# Patient Record
Sex: FEMALE | Race: WHITE | URBAN - METROPOLITAN AREA
[De-identification: names, ages, dates, MRNs, and addresses within clinical notes are randomized per-mention and may not be internally consistent; named-entity substitution may affect disease eponyms.]

---

## 2017-01-01 ENCOUNTER — TRANSFERRED RECORDS (OUTPATIENT)
Dept: HEALTH INFORMATION MANAGEMENT | Facility: CLINIC | Age: 82
End: 2017-01-01

## 2017-01-01 LAB — EJECTION FRACTION: 53

## 2017-01-03 ENCOUNTER — COMMUNICATION - HEALTHEAST (OUTPATIENT)
Dept: NURSING | Facility: CLINIC | Age: 82
End: 2017-01-03

## 2017-01-03 ENCOUNTER — AMBULATORY - HEALTHEAST (OUTPATIENT)
Dept: LAB | Facility: CLINIC | Age: 82
End: 2017-01-03

## 2017-01-03 DIAGNOSIS — I48.19 PERSISTENT ATRIAL FIBRILLATION (H): ICD-10-CM

## 2017-02-16 ENCOUNTER — TRANSFERRED RECORDS (OUTPATIENT)
Dept: HEALTH INFORMATION MANAGEMENT | Facility: CLINIC | Age: 82
End: 2017-02-16

## 2017-02-16 ENCOUNTER — RECORDS - HEALTHEAST (OUTPATIENT)
Dept: ADMINISTRATIVE | Facility: OTHER | Age: 82
End: 2017-02-16

## 2017-02-16 LAB — EJECTION FRACTION: 58

## 2017-02-21 ENCOUNTER — COMMUNICATION - HEALTHEAST (OUTPATIENT)
Dept: NURSING | Facility: CLINIC | Age: 82
End: 2017-02-21

## 2017-02-23 ENCOUNTER — AMBULATORY - HEALTHEAST (OUTPATIENT)
Dept: LAB | Facility: CLINIC | Age: 82
End: 2017-02-23

## 2017-02-23 ENCOUNTER — COMMUNICATION - HEALTHEAST (OUTPATIENT)
Dept: INTERNAL MEDICINE | Facility: CLINIC | Age: 82
End: 2017-02-23

## 2017-02-23 ENCOUNTER — COMMUNICATION - HEALTHEAST (OUTPATIENT)
Dept: FAMILY MEDICINE | Facility: CLINIC | Age: 82
End: 2017-02-23

## 2017-02-23 DIAGNOSIS — I48.91 ATRIAL FIBRILLATION (H): ICD-10-CM

## 2017-02-23 DIAGNOSIS — I48.19 PERSISTENT ATRIAL FIBRILLATION (H): ICD-10-CM

## 2017-03-01 ENCOUNTER — COMMUNICATION - HEALTHEAST (OUTPATIENT)
Dept: FAMILY MEDICINE | Facility: CLINIC | Age: 82
End: 2017-03-01

## 2017-03-01 DIAGNOSIS — I48.91 ATRIAL FIBRILLATION (H): ICD-10-CM

## 2017-03-02 ENCOUNTER — COMMUNICATION - HEALTHEAST (OUTPATIENT)
Dept: FAMILY MEDICINE | Facility: CLINIC | Age: 82
End: 2017-03-02

## 2017-03-02 DIAGNOSIS — I48.91 ATRIAL FIBRILLATION (H): ICD-10-CM

## 2017-03-02 RX ORDER — WARFARIN SODIUM 5 MG/1
TABLET ORAL
Qty: 30 TABLET | Refills: 2 | Status: SHIPPED | OUTPATIENT
Start: 2017-03-02

## 2017-03-06 ENCOUNTER — COMMUNICATION - HEALTHEAST (OUTPATIENT)
Dept: FAMILY MEDICINE | Facility: CLINIC | Age: 82
End: 2017-03-06

## 2017-03-06 DIAGNOSIS — I48.91 ATRIAL FIBRILLATION (H): ICD-10-CM

## 2017-03-14 ENCOUNTER — COMMUNICATION - HEALTHEAST (OUTPATIENT)
Dept: NURSING | Facility: CLINIC | Age: 82
End: 2017-03-14

## 2017-03-14 DIAGNOSIS — I48.91 ATRIAL FIBRILLATION (H): ICD-10-CM

## 2017-03-16 ENCOUNTER — COMMUNICATION - HEALTHEAST (OUTPATIENT)
Dept: NURSING | Facility: CLINIC | Age: 82
End: 2017-03-16

## 2017-04-01 ENCOUNTER — COMMUNICATION - HEALTHEAST (OUTPATIENT)
Dept: FAMILY MEDICINE | Facility: CLINIC | Age: 82
End: 2017-04-01

## 2017-04-01 DIAGNOSIS — I10 ESSENTIAL HYPERTENSION: ICD-10-CM

## 2017-04-03 ENCOUNTER — COMMUNICATION - HEALTHEAST (OUTPATIENT)
Dept: NURSING | Facility: CLINIC | Age: 82
End: 2017-04-03

## 2017-04-03 ENCOUNTER — AMBULATORY - HEALTHEAST (OUTPATIENT)
Dept: LAB | Facility: CLINIC | Age: 82
End: 2017-04-03

## 2017-04-03 DIAGNOSIS — I48.91 ATRIAL FIBRILLATION (H): ICD-10-CM

## 2017-04-03 RX ORDER — LOSARTAN POTASSIUM 100 MG/1
TABLET ORAL
Qty: 90 TABLET | Refills: 2 | Status: SHIPPED | OUTPATIENT
Start: 2017-04-03

## 2017-04-17 ENCOUNTER — COMMUNICATION - HEALTHEAST (OUTPATIENT)
Dept: NURSING | Facility: CLINIC | Age: 82
End: 2017-04-17

## 2017-04-17 ENCOUNTER — AMBULATORY - HEALTHEAST (OUTPATIENT)
Dept: LAB | Facility: CLINIC | Age: 82
End: 2017-04-17

## 2017-04-17 DIAGNOSIS — I48.91 ATRIAL FIBRILLATION (H): ICD-10-CM

## 2017-04-19 ENCOUNTER — COMMUNICATION - HEALTHEAST (OUTPATIENT)
Dept: FAMILY MEDICINE | Facility: CLINIC | Age: 82
End: 2017-04-19

## 2017-04-19 DIAGNOSIS — I48.91 ATRIAL FIBRILLATION (H): ICD-10-CM

## 2017-04-24 ENCOUNTER — COMMUNICATION - HEALTHEAST (OUTPATIENT)
Dept: FAMILY MEDICINE | Facility: CLINIC | Age: 82
End: 2017-04-24

## 2017-05-02 ENCOUNTER — AMBULATORY - HEALTHEAST (OUTPATIENT)
Dept: LAB | Facility: CLINIC | Age: 82
End: 2017-05-02

## 2017-05-02 ENCOUNTER — COMMUNICATION - HEALTHEAST (OUTPATIENT)
Dept: NURSING | Facility: CLINIC | Age: 82
End: 2017-05-02

## 2017-05-02 DIAGNOSIS — I48.91 ATRIAL FIBRILLATION (H): ICD-10-CM

## 2017-05-16 ENCOUNTER — COMMUNICATION - HEALTHEAST (OUTPATIENT)
Dept: NURSING | Facility: CLINIC | Age: 82
End: 2017-05-16

## 2017-05-16 ENCOUNTER — AMBULATORY - HEALTHEAST (OUTPATIENT)
Dept: LAB | Facility: CLINIC | Age: 82
End: 2017-05-16

## 2017-05-16 DIAGNOSIS — I48.91 ATRIAL FIBRILLATION (H): ICD-10-CM

## 2017-06-12 ENCOUNTER — COMMUNICATION - HEALTHEAST (OUTPATIENT)
Dept: NURSING | Facility: CLINIC | Age: 82
End: 2017-06-12

## 2017-06-12 ENCOUNTER — OFFICE VISIT - HEALTHEAST (OUTPATIENT)
Dept: FAMILY MEDICINE | Facility: CLINIC | Age: 82
End: 2017-06-12

## 2017-06-12 DIAGNOSIS — R26.9 ABNORMALITY OF GAIT: ICD-10-CM

## 2017-06-12 DIAGNOSIS — E55.9 VITAMIN D DEFICIENCY: ICD-10-CM

## 2017-06-12 DIAGNOSIS — M12.9 ARTHROPATHY: ICD-10-CM

## 2017-06-12 DIAGNOSIS — I10 ESSENTIAL HYPERTENSION: ICD-10-CM

## 2017-06-12 DIAGNOSIS — I10 ESSENTIAL HYPERTENSION WITH GOAL BLOOD PRESSURE LESS THAN 140/90: ICD-10-CM

## 2017-06-12 DIAGNOSIS — I48.91 ATRIAL FIBRILLATION (H): ICD-10-CM

## 2017-06-12 DIAGNOSIS — F32.0 MAJOR DEPRESSIVE DISORDER, SINGLE EPISODE, MILD (H): ICD-10-CM

## 2017-06-12 DIAGNOSIS — Z95.0 PRESENCE OF CARDIAC PACEMAKER: ICD-10-CM

## 2017-06-12 DIAGNOSIS — G47.00 INSOMNIA, UNSPECIFIED: ICD-10-CM

## 2017-06-12 DIAGNOSIS — I35.9 AORTIC VALVE DISORDER: ICD-10-CM

## 2017-06-12 ASSESSMENT — MIFFLIN-ST. JEOR: SCORE: 1312.29

## 2017-06-13 ENCOUNTER — COMMUNICATION - HEALTHEAST (OUTPATIENT)
Dept: FAMILY MEDICINE | Facility: CLINIC | Age: 82
End: 2017-06-13

## 2017-06-15 ENCOUNTER — COMMUNICATION - HEALTHEAST (OUTPATIENT)
Dept: FAMILY MEDICINE | Facility: CLINIC | Age: 82
End: 2017-06-15

## 2017-06-15 DIAGNOSIS — F41.9 ANXIETY: ICD-10-CM

## 2017-06-15 DIAGNOSIS — I48.91 ATRIAL FIBRILLATION (H): ICD-10-CM

## 2017-06-15 RX ORDER — CITALOPRAM HYDROBROMIDE 40 MG/1
20 TABLET ORAL DAILY
Qty: 45 TABLET | Refills: 3 | Status: SHIPPED | OUTPATIENT
Start: 2017-06-15

## 2017-06-15 RX ORDER — WARFARIN SODIUM 2.5 MG/1
TABLET ORAL
Qty: 110 TABLET | Refills: 1 | Status: SHIPPED | OUTPATIENT
Start: 2017-06-15

## 2017-07-03 ENCOUNTER — COMMUNICATION - HEALTHEAST (OUTPATIENT)
Dept: NURSING | Facility: CLINIC | Age: 82
End: 2017-07-03

## 2017-07-10 ENCOUNTER — AMBULATORY - HEALTHEAST (OUTPATIENT)
Dept: LAB | Facility: CLINIC | Age: 82
End: 2017-07-10

## 2017-07-10 ENCOUNTER — COMMUNICATION - HEALTHEAST (OUTPATIENT)
Dept: NURSING | Facility: CLINIC | Age: 82
End: 2017-07-10

## 2017-07-10 DIAGNOSIS — I48.91 ATRIAL FIBRILLATION (H): ICD-10-CM

## 2017-07-24 ENCOUNTER — COMMUNICATION - HEALTHEAST (OUTPATIENT)
Dept: NURSING | Facility: CLINIC | Age: 82
End: 2017-07-24

## 2017-07-24 ENCOUNTER — OFFICE VISIT - HEALTHEAST (OUTPATIENT)
Dept: FAMILY MEDICINE | Facility: CLINIC | Age: 82
End: 2017-07-24

## 2017-07-24 ENCOUNTER — AMBULATORY - HEALTHEAST (OUTPATIENT)
Dept: LAB | Facility: CLINIC | Age: 82
End: 2017-07-24

## 2017-07-24 ENCOUNTER — RECORDS - HEALTHEAST (OUTPATIENT)
Dept: GENERAL RADIOLOGY | Age: 82
End: 2017-07-24

## 2017-07-24 DIAGNOSIS — I48.91 ATRIAL FIBRILLATION (H): ICD-10-CM

## 2017-07-24 DIAGNOSIS — M25.511 ACUTE SHOULDER PAIN DUE TO TRAUMA, RIGHT: ICD-10-CM

## 2017-07-24 DIAGNOSIS — G89.11 ACUTE PAIN DUE TO TRAUMA: ICD-10-CM
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
## 2017-07-24 DIAGNOSIS — G89.11 ACUTE SHOULDER PAIN DUE TO TRAUMA, RIGHT: ICD-10-CM

## 2017-07-24 DIAGNOSIS — M25.511 PAIN IN RIGHT SHOULDER: ICD-10-CM

## 2017-08-05 ENCOUNTER — RECORDS - HEALTHEAST (OUTPATIENT)
Dept: ADMINISTRATIVE | Facility: OTHER | Age: 82
End: 2017-08-05

## 2017-08-14 ENCOUNTER — COMMUNICATION - HEALTHEAST (OUTPATIENT)
Dept: NURSING | Facility: CLINIC | Age: 82
End: 2017-08-14

## 2017-08-17 ENCOUNTER — AMBULATORY - HEALTHEAST (OUTPATIENT)
Dept: LAB | Facility: CLINIC | Age: 82
End: 2017-08-17

## 2017-08-17 ENCOUNTER — COMMUNICATION - HEALTHEAST (OUTPATIENT)
Dept: NURSING | Facility: CLINIC | Age: 82
End: 2017-08-17

## 2017-08-17 DIAGNOSIS — I48.91 ATRIAL FIBRILLATION (H): ICD-10-CM

## 2017-09-07 ENCOUNTER — COMMUNICATION - HEALTHEAST (OUTPATIENT)
Dept: NURSING | Facility: CLINIC | Age: 82
End: 2017-09-07

## 2017-09-07 ENCOUNTER — AMBULATORY - HEALTHEAST (OUTPATIENT)
Dept: LAB | Facility: CLINIC | Age: 82
End: 2017-09-07

## 2017-09-07 DIAGNOSIS — I48.91 ATRIAL FIBRILLATION (H): ICD-10-CM

## 2017-10-09 ENCOUNTER — COMMUNICATION - HEALTHEAST (OUTPATIENT)
Dept: NURSING | Facility: CLINIC | Age: 82
End: 2017-10-09

## 2017-10-09 ENCOUNTER — AMBULATORY - HEALTHEAST (OUTPATIENT)
Dept: LAB | Facility: CLINIC | Age: 82
End: 2017-10-09

## 2017-10-09 DIAGNOSIS — I48.91 ATRIAL FIBRILLATION (H): ICD-10-CM

## 2017-10-30 ENCOUNTER — COMMUNICATION - HEALTHEAST (OUTPATIENT)
Dept: NURSING | Facility: CLINIC | Age: 82
End: 2017-10-30

## 2017-11-20 ENCOUNTER — AMBULATORY - HEALTHEAST (OUTPATIENT)
Dept: LAB | Facility: CLINIC | Age: 82
End: 2017-11-20

## 2017-11-20 ENCOUNTER — COMMUNICATION - HEALTHEAST (OUTPATIENT)
Dept: NURSING | Facility: CLINIC | Age: 82
End: 2017-11-20

## 2017-11-20 DIAGNOSIS — I48.91 ATRIAL FIBRILLATION (H): ICD-10-CM

## 2017-11-22 ENCOUNTER — COMMUNICATION - HEALTHEAST (OUTPATIENT)
Dept: FAMILY MEDICINE | Facility: CLINIC | Age: 82
End: 2017-11-22

## 2017-11-22 DIAGNOSIS — I48.91 ATRIAL FIBRILLATION (H): ICD-10-CM

## 2017-11-22 RX ORDER — FUROSEMIDE 20 MG
TABLET ORAL
Qty: 90 TABLET | Refills: 1 | Status: SHIPPED | OUTPATIENT
Start: 2017-11-22

## 2017-12-04 ENCOUNTER — COMMUNICATION - HEALTHEAST (OUTPATIENT)
Dept: NURSING | Facility: CLINIC | Age: 82
End: 2017-12-04

## 2017-12-04 ENCOUNTER — AMBULATORY - HEALTHEAST (OUTPATIENT)
Dept: LAB | Facility: CLINIC | Age: 82
End: 2017-12-04

## 2017-12-04 DIAGNOSIS — I48.91 ATRIAL FIBRILLATION (H): ICD-10-CM

## 2017-12-18 ENCOUNTER — AMBULATORY - HEALTHEAST (OUTPATIENT)
Dept: LAB | Facility: CLINIC | Age: 82
End: 2017-12-18

## 2017-12-18 ENCOUNTER — COMMUNICATION - HEALTHEAST (OUTPATIENT)
Dept: NURSING | Facility: CLINIC | Age: 82
End: 2017-12-18

## 2017-12-18 DIAGNOSIS — I48.91 ATRIAL FIBRILLATION (H): ICD-10-CM

## 2018-01-01 ENCOUNTER — TRANSFERRED RECORDS (OUTPATIENT)
Dept: HEALTH INFORMATION MANAGEMENT | Facility: CLINIC | Age: 83
End: 2018-01-01

## 2018-01-01 ENCOUNTER — ASSISTED LIVING VISIT (OUTPATIENT)
Dept: GERIATRICS | Facility: CLINIC | Age: 83
End: 2018-01-01
Payer: COMMERCIAL

## 2018-01-01 ENCOUNTER — NURSING HOME VISIT (OUTPATIENT)
Dept: GERIATRICS | Facility: CLINIC | Age: 83
End: 2018-01-01
Payer: COMMERCIAL

## 2018-01-01 ENCOUNTER — TELEPHONE (OUTPATIENT)
Dept: CARDIOLOGY | Facility: CLINIC | Age: 83
End: 2018-01-01

## 2018-01-01 ENCOUNTER — DOCUMENTATION ONLY (OUTPATIENT)
Dept: OTHER | Facility: CLINIC | Age: 83
End: 2018-01-01

## 2018-01-01 ENCOUNTER — HOSPITAL LABORATORY (OUTPATIENT)
Dept: OTHER | Facility: CLINIC | Age: 83
End: 2018-01-01

## 2018-01-01 ENCOUNTER — DOCUMENTATION ONLY (OUTPATIENT)
Dept: CARE COORDINATION | Facility: CLINIC | Age: 83
End: 2018-01-01

## 2018-01-01 ENCOUNTER — NURSING HOME VISIT (OUTPATIENT)
Dept: GERIATRICS | Facility: CLINIC | Age: 83
End: 2018-01-01
Payer: MEDICARE

## 2018-01-01 ENCOUNTER — HOSPITAL ENCOUNTER (INPATIENT)
Facility: CLINIC | Age: 83
LOS: 4 days | Discharge: HOME HEALTH | DRG: 689 | End: 2018-06-05
Attending: EMERGENCY MEDICINE | Admitting: INTERNAL MEDICINE
Payer: MEDICARE

## 2018-01-01 ENCOUNTER — APPOINTMENT (OUTPATIENT)
Dept: CARDIOLOGY | Facility: CLINIC | Age: 83
DRG: 689 | End: 2018-01-01
Attending: INTERNAL MEDICINE
Payer: MEDICARE

## 2018-01-01 ENCOUNTER — TELEPHONE (OUTPATIENT)
Dept: GERIATRICS | Facility: CLINIC | Age: 83
End: 2018-01-01

## 2018-01-01 ENCOUNTER — DISCHARGE SUMMARY NURSING HOME (OUTPATIENT)
Dept: GERIATRICS | Facility: CLINIC | Age: 83
End: 2018-01-01
Payer: COMMERCIAL

## 2018-01-01 ENCOUNTER — APPOINTMENT (OUTPATIENT)
Dept: GENERAL RADIOLOGY | Facility: CLINIC | Age: 83
DRG: 689 | End: 2018-01-01
Attending: EMERGENCY MEDICINE
Payer: MEDICARE

## 2018-01-01 ENCOUNTER — DOCUMENTATION ONLY (OUTPATIENT)
Dept: GERIATRICS | Facility: CLINIC | Age: 83
End: 2018-01-01
Payer: COMMERCIAL

## 2018-01-01 VITALS
BODY MASS INDEX: 28.92 KG/M2 | OXYGEN SATURATION: 88 % | DIASTOLIC BLOOD PRESSURE: 74 MMHG | HEART RATE: 62 BPM | WEIGHT: 190.8 LBS | RESPIRATION RATE: 16 BRPM | TEMPERATURE: 97.2 F | SYSTOLIC BLOOD PRESSURE: 111 MMHG | HEIGHT: 68 IN

## 2018-01-01 VITALS
HEIGHT: 68 IN | BODY MASS INDEX: 29.31 KG/M2 | WEIGHT: 193.4 LBS | SYSTOLIC BLOOD PRESSURE: 134 MMHG | TEMPERATURE: 98.1 F | RESPIRATION RATE: 16 BRPM | HEART RATE: 69 BPM | OXYGEN SATURATION: 94 % | DIASTOLIC BLOOD PRESSURE: 77 MMHG

## 2018-01-01 VITALS
SYSTOLIC BLOOD PRESSURE: 137 MMHG | HEART RATE: 63 BPM | HEIGHT: 68 IN | RESPIRATION RATE: 17 BRPM | TEMPERATURE: 98.6 F | DIASTOLIC BLOOD PRESSURE: 67 MMHG | OXYGEN SATURATION: 93 % | BODY MASS INDEX: 29.4 KG/M2 | WEIGHT: 194 LBS

## 2018-01-01 VITALS
RESPIRATION RATE: 16 BRPM | DIASTOLIC BLOOD PRESSURE: 75 MMHG | OXYGEN SATURATION: 97 % | SYSTOLIC BLOOD PRESSURE: 131 MMHG | BODY MASS INDEX: 31.47 KG/M2 | WEIGHT: 207 LBS | HEART RATE: 69 BPM | TEMPERATURE: 98.6 F

## 2018-01-01 VITALS
DIASTOLIC BLOOD PRESSURE: 86 MMHG | OXYGEN SATURATION: 93 % | SYSTOLIC BLOOD PRESSURE: 146 MMHG | HEIGHT: 68 IN | HEART RATE: 80 BPM | WEIGHT: 192.8 LBS | TEMPERATURE: 98.1 F | RESPIRATION RATE: 16 BRPM | BODY MASS INDEX: 29.22 KG/M2

## 2018-01-01 VITALS
DIASTOLIC BLOOD PRESSURE: 62 MMHG | RESPIRATION RATE: 18 BRPM | WEIGHT: 206.5 LBS | HEART RATE: 55 BPM | TEMPERATURE: 97 F | OXYGEN SATURATION: 93 % | SYSTOLIC BLOOD PRESSURE: 127 MMHG | HEIGHT: 68 IN | BODY MASS INDEX: 31.3 KG/M2

## 2018-01-01 VITALS
SYSTOLIC BLOOD PRESSURE: 143 MMHG | WEIGHT: 206.6 LBS | HEART RATE: 63 BPM | RESPIRATION RATE: 16 BRPM | BODY MASS INDEX: 31.41 KG/M2 | TEMPERATURE: 98.2 F | DIASTOLIC BLOOD PRESSURE: 83 MMHG | OXYGEN SATURATION: 95 %

## 2018-01-01 VITALS
TEMPERATURE: 98.2 F | DIASTOLIC BLOOD PRESSURE: 91 MMHG | BODY MASS INDEX: 29.8 KG/M2 | OXYGEN SATURATION: 96 % | WEIGHT: 196 LBS | SYSTOLIC BLOOD PRESSURE: 142 MMHG | RESPIRATION RATE: 17 BRPM | HEART RATE: 86 BPM

## 2018-01-01 VITALS
SYSTOLIC BLOOD PRESSURE: 130 MMHG | TEMPERATURE: 98 F | DIASTOLIC BLOOD PRESSURE: 76 MMHG | RESPIRATION RATE: 16 BRPM | HEART RATE: 61 BPM | OXYGEN SATURATION: 94 %

## 2018-01-01 VITALS
OXYGEN SATURATION: 97 % | HEART RATE: 58 BPM | BODY MASS INDEX: 31.17 KG/M2 | SYSTOLIC BLOOD PRESSURE: 131 MMHG | WEIGHT: 205 LBS | TEMPERATURE: 98.8 F | RESPIRATION RATE: 16 BRPM | DIASTOLIC BLOOD PRESSURE: 82 MMHG

## 2018-01-01 VITALS
OXYGEN SATURATION: 96 % | BODY MASS INDEX: 30.03 KG/M2 | WEIGHT: 197.5 LBS | HEART RATE: 57 BPM | SYSTOLIC BLOOD PRESSURE: 115 MMHG | DIASTOLIC BLOOD PRESSURE: 79 MMHG | RESPIRATION RATE: 18 BRPM

## 2018-01-01 VITALS
OXYGEN SATURATION: 91 % | RESPIRATION RATE: 16 BRPM | SYSTOLIC BLOOD PRESSURE: 130 MMHG | TEMPERATURE: 97.4 F | HEART RATE: 66 BPM | DIASTOLIC BLOOD PRESSURE: 70 MMHG

## 2018-01-01 VITALS
RESPIRATION RATE: 18 BRPM | BODY MASS INDEX: 29.4 KG/M2 | OXYGEN SATURATION: 94 % | HEIGHT: 68 IN | DIASTOLIC BLOOD PRESSURE: 71 MMHG | TEMPERATURE: 98.1 F | WEIGHT: 194 LBS | HEART RATE: 73 BPM | SYSTOLIC BLOOD PRESSURE: 156 MMHG

## 2018-01-01 VITALS
OXYGEN SATURATION: 93 % | DIASTOLIC BLOOD PRESSURE: 90 MMHG | BODY MASS INDEX: 31.28 KG/M2 | HEART RATE: 81 BPM | WEIGHT: 205.7 LBS | SYSTOLIC BLOOD PRESSURE: 145 MMHG | RESPIRATION RATE: 18 BRPM

## 2018-01-01 VITALS
SYSTOLIC BLOOD PRESSURE: 138 MMHG | RESPIRATION RATE: 17 BRPM | WEIGHT: 191.4 LBS | OXYGEN SATURATION: 97 % | DIASTOLIC BLOOD PRESSURE: 81 MMHG | HEIGHT: 68 IN | BODY MASS INDEX: 29.01 KG/M2 | TEMPERATURE: 97.7 F | HEART RATE: 83 BPM

## 2018-01-01 VITALS
OXYGEN SATURATION: 94 % | TEMPERATURE: 98.2 F | RESPIRATION RATE: 16 BRPM | HEART RATE: 55 BPM | SYSTOLIC BLOOD PRESSURE: 122 MMHG | DIASTOLIC BLOOD PRESSURE: 73 MMHG

## 2018-01-01 VITALS
HEART RATE: 83 BPM | SYSTOLIC BLOOD PRESSURE: 142 MMHG | WEIGHT: 200.9 LBS | OXYGEN SATURATION: 95 % | RESPIRATION RATE: 16 BRPM | DIASTOLIC BLOOD PRESSURE: 92 MMHG | TEMPERATURE: 97.9 F | BODY MASS INDEX: 30.55 KG/M2

## 2018-01-01 VITALS — RESPIRATION RATE: 28 BRPM | HEART RATE: 105 BPM | OXYGEN SATURATION: 65 %

## 2018-01-01 DIAGNOSIS — Z79.01 LONG-TERM (CURRENT) USE OF ANTICOAGULANTS: ICD-10-CM

## 2018-01-01 DIAGNOSIS — R41.89 COGNITIVE IMPAIRMENT: ICD-10-CM

## 2018-01-01 DIAGNOSIS — R53.81 PHYSICAL DECONDITIONING: ICD-10-CM

## 2018-01-01 DIAGNOSIS — F43.21 ADJUSTMENT DISORDER WITH DEPRESSED MOOD: ICD-10-CM

## 2018-01-01 DIAGNOSIS — I35.0 CALCIFIC AORTIC VALVE STENOSIS: ICD-10-CM

## 2018-01-01 DIAGNOSIS — I48.20 CHRONIC ATRIAL FIBRILLATION (H): ICD-10-CM

## 2018-01-01 DIAGNOSIS — Z79.01 ENCOUNTER FOR MONITORING COUMADIN THERAPY: Primary | ICD-10-CM

## 2018-01-01 DIAGNOSIS — J10.1 INFLUENZA A: Primary | ICD-10-CM

## 2018-01-01 DIAGNOSIS — Z51.81 ENCOUNTER FOR THERAPEUTIC DRUG MONITORING: Primary | ICD-10-CM

## 2018-01-01 DIAGNOSIS — I50.9 CHRONIC CONGESTIVE HEART FAILURE, UNSPECIFIED CONGESTIVE HEART FAILURE TYPE: Primary | ICD-10-CM

## 2018-01-01 DIAGNOSIS — I48.20 CHRONIC A-FIB (H): ICD-10-CM

## 2018-01-01 DIAGNOSIS — N30.00 ACUTE CYSTITIS WITHOUT HEMATURIA: ICD-10-CM

## 2018-01-01 DIAGNOSIS — Z51.81 ENCOUNTER FOR MONITORING COUMADIN THERAPY: Primary | ICD-10-CM

## 2018-01-01 DIAGNOSIS — I10 BENIGN ESSENTIAL HYPERTENSION: ICD-10-CM

## 2018-01-01 DIAGNOSIS — R60.0 LOWER EXTREMITY EDEMA: ICD-10-CM

## 2018-01-01 DIAGNOSIS — I50.9 CHRONIC CONGESTIVE HEART FAILURE, UNSPECIFIED CONGESTIVE HEART FAILURE TYPE: ICD-10-CM

## 2018-01-01 DIAGNOSIS — Z95.0 CARDIAC PACEMAKER IN SITU: ICD-10-CM

## 2018-01-01 DIAGNOSIS — J10.1 INFLUENZA A: ICD-10-CM

## 2018-01-01 DIAGNOSIS — I50.42 CHRONIC COMBINED SYSTOLIC AND DIASTOLIC CONGESTIVE HEART FAILURE (H): Primary | ICD-10-CM

## 2018-01-01 DIAGNOSIS — I50.33 ACUTE ON CHRONIC DIASTOLIC CONGESTIVE HEART FAILURE (H): ICD-10-CM

## 2018-01-01 DIAGNOSIS — F32.A DEPRESSION, UNSPECIFIED DEPRESSION TYPE: ICD-10-CM

## 2018-01-01 DIAGNOSIS — F02.80 LATE ONSET ALZHEIMER'S DISEASE WITHOUT BEHAVIORAL DISTURBANCE (H): ICD-10-CM

## 2018-01-01 DIAGNOSIS — G30.1 LATE ONSET ALZHEIMER'S DISEASE WITHOUT BEHAVIORAL DISTURBANCE (H): ICD-10-CM

## 2018-01-01 DIAGNOSIS — E55.9 VITAMIN D DEFICIENCY: ICD-10-CM

## 2018-01-01 DIAGNOSIS — Z71.89 ACP (ADVANCE CARE PLANNING): Chronic | ICD-10-CM

## 2018-01-01 DIAGNOSIS — I48.91 ATRIAL FIBRILLATION, UNSPECIFIED TYPE (H): ICD-10-CM

## 2018-01-01 DIAGNOSIS — Z51.81 ENCOUNTER FOR MONITORING COUMADIN THERAPY: ICD-10-CM

## 2018-01-01 DIAGNOSIS — I50.9 CONGESTIVE HEART FAILURE, UNSPECIFIED CONGESTIVE HEART FAILURE CHRONICITY, UNSPECIFIED CONGESTIVE HEART FAILURE TYPE: ICD-10-CM

## 2018-01-01 DIAGNOSIS — I50.32 CHRONIC DIASTOLIC CONGESTIVE HEART FAILURE (H): ICD-10-CM

## 2018-01-01 DIAGNOSIS — R29.898 SEVERE MUSCLE DECONDITIONING: Primary | ICD-10-CM

## 2018-01-01 DIAGNOSIS — I50.31 ACUTE DIASTOLIC CONGESTIVE HEART FAILURE (H): ICD-10-CM

## 2018-01-01 DIAGNOSIS — Z79.01 ENCOUNTER FOR MONITORING COUMADIN THERAPY: ICD-10-CM

## 2018-01-01 DIAGNOSIS — G47.00 INSOMNIA, UNSPECIFIED TYPE: ICD-10-CM

## 2018-01-01 DIAGNOSIS — Z51.81 ENCOUNTER FOR THERAPEUTIC DRUG MONITORING: ICD-10-CM

## 2018-01-01 DIAGNOSIS — R06.02 SHORTNESS OF BREATH: ICD-10-CM

## 2018-01-01 DIAGNOSIS — Z87.09 HISTORY OF INFLUENZA: Primary | ICD-10-CM

## 2018-01-01 DIAGNOSIS — R00.1 BRADYCARDIA: ICD-10-CM

## 2018-01-01 DIAGNOSIS — R31.0 GROSS HEMATURIA: ICD-10-CM

## 2018-01-01 DIAGNOSIS — R79.89 ELEVATED BRAIN NATRIURETIC PEPTIDE (BNP) LEVEL: ICD-10-CM

## 2018-01-01 DIAGNOSIS — R06.02 SOB (SHORTNESS OF BREATH): Primary | ICD-10-CM

## 2018-01-01 DIAGNOSIS — R53.81 DECLINING FUNCTIONAL STATUS: Primary | ICD-10-CM

## 2018-01-01 LAB
ALBUMIN UR-MCNC: NEGATIVE MG/DL
ANION GAP SERPL CALCULATED.3IONS-SCNC: 2 MMOL/L (ref 3–14)
ANION GAP SERPL CALCULATED.3IONS-SCNC: 3 MMOL/L (ref 3–14)
ANION GAP SERPL CALCULATED.3IONS-SCNC: 3 MMOL/L (ref 3–14)
ANION GAP SERPL CALCULATED.3IONS-SCNC: 4 MMOL/L (ref 3–14)
ANION GAP SERPL CALCULATED.3IONS-SCNC: 5 MMOL/L (ref 3–14)
ANION GAP SERPL CALCULATED.3IONS-SCNC: 5 MMOL/L (ref 3–14)
ANION GAP SERPL CALCULATED.3IONS-SCNC: 6 MMOL/L (ref 3–14)
ANION GAP SERPL CALCULATED.3IONS-SCNC: 7 MMOL/L (ref 3–14)
ANION GAP SERPL CALCULATED.3IONS-SCNC: 8 MMOL/L (ref 3–14)
ANION GAP SERPL CALCULATED.3IONS-SCNC: 8 MMOL/L (ref 3–14)
APPEARANCE UR: ABNORMAL
BACTERIA #/AREA URNS HPF: ABNORMAL /HPF
BACTERIA SPEC CULT: ABNORMAL
BASOPHILS # BLD AUTO: 0.1 10E9/L (ref 0–0.2)
BASOPHILS NFR BLD AUTO: 0.8 %
BILIRUB UR QL STRIP: NEGATIVE
BUN SERPL-MCNC: 17 MG/DL (ref 7–30)
BUN SERPL-MCNC: 19 MG/DL (ref 7–30)
BUN SERPL-MCNC: 19 MG/DL (ref 7–30)
BUN SERPL-MCNC: 20 MG/DL (ref 7–30)
BUN SERPL-MCNC: 21 MG/DL (ref 7–30)
BUN SERPL-MCNC: 22 MG/DL (ref 7–30)
BUN SERPL-MCNC: 23 MG/DL (ref 7–30)
BUN SERPL-MCNC: 24 MG/DL (ref 7–30)
CALCIUM SERPL-MCNC: 8.9 MG/DL (ref 8.5–10.1)
CALCIUM SERPL-MCNC: 9 MG/DL (ref 8.5–10.1)
CALCIUM SERPL-MCNC: 9 MG/DL (ref 8.5–10.1)
CALCIUM SERPL-MCNC: 9.1 MG/DL (ref 8.5–10.1)
CALCIUM SERPL-MCNC: 9.3 MG/DL (ref 8.5–10.1)
CALCIUM SERPL-MCNC: 9.3 MG/DL (ref 8.5–10.1)
CALCIUM SERPL-MCNC: 9.4 MG/DL (ref 8.5–10.1)
CALCIUM SERPL-MCNC: 9.4 MG/DL (ref 8.5–10.1)
CALCIUM SERPL-MCNC: 9.5 MG/DL (ref 8.5–10.1)
CHLORIDE SERPL-SCNC: 102 MMOL/L (ref 94–109)
CHLORIDE SERPL-SCNC: 102 MMOL/L (ref 94–109)
CHLORIDE SERPL-SCNC: 103 MMOL/L (ref 94–109)
CHLORIDE SERPL-SCNC: 105 MMOL/L (ref 94–109)
CHLORIDE SERPL-SCNC: 105 MMOL/L (ref 94–109)
CHLORIDE SERPLBLD-SCNC: 102 MMOL/L (ref 94–109)
CHLORIDE SERPLBLD-SCNC: 102 MMOL/L (ref 94–109)
CHLORIDE SERPLBLD-SCNC: 104 MMOL/L (ref 94–109)
CHLORIDE SERPLBLD-SCNC: 104 MMOL/L (ref 94–109)
CHLORIDE SERPLBLD-SCNC: 105 MMOL/L (ref 94–109)
CHLORIDE SERPLBLD-SCNC: 106 MMOL/L (ref 94–109)
CHLORIDE SERPLBLD-SCNC: 107 MMOL/L (ref 94–109)
CO2 SERPL-SCNC: 29 MMOL/L (ref 20–32)
CO2 SERPL-SCNC: 29 MMOL/L (ref 20–32)
CO2 SERPL-SCNC: 30 MMOL/L (ref 20–32)
CO2 SERPL-SCNC: 30 MMOL/L (ref 20–32)
CO2 SERPL-SCNC: 31 MMOL/L (ref 20–32)
CO2 SERPL-SCNC: 32 MMOL/L (ref 20–32)
CO2 SERPL-SCNC: 33 MMOL/L (ref 20–32)
CO2 SERPL-SCNC: 33 MMOL/L (ref 20–32)
CO2 SERPL-SCNC: 34 MMOL/L (ref 20–32)
CO2 SERPL-SCNC: 35 MMOL/L (ref 20–32)
COLOR UR AUTO: YELLOW
CREAT SERPL-MCNC: 0.64 MG/DL (ref 0.52–1.04)
CREAT SERPL-MCNC: 0.71 MG/DL (ref 0.52–1.04)
CREAT SERPL-MCNC: 0.72 MG/DL (ref 0.52–1.04)
CREAT SERPL-MCNC: 0.72 MG/DL (ref 0.52–1.04)
CREAT SERPL-MCNC: 0.74 MG/DL (ref 0.52–1.04)
CREAT SERPL-MCNC: 0.78 MG/DL (ref 0.52–1.04)
CREAT SERPL-MCNC: 0.78 MG/DL (ref 0.52–1.04)
CREAT SERPL-MCNC: 0.79 MG/DL (ref 0.52–1.04)
CREAT SERPL-MCNC: 0.8 MG/DL (ref 0.52–1.04)
CREAT SERPL-MCNC: 0.81 MG/DL (ref 0.52–1.04)
CREAT SERPL-MCNC: 0.84 MG/DL (ref 0.52–1.04)
CREAT SERPL-MCNC: 0.84 MG/DL (ref 0.52–1.04)
DIFFERENTIAL METHOD BLD: ABNORMAL
EOSINOPHIL # BLD AUTO: 0.2 10E9/L (ref 0–0.7)
EOSINOPHIL NFR BLD AUTO: 2.6 %
ERYTHROCYTE [DISTWIDTH] IN BLOOD BY AUTOMATED COUNT: 16.2 % (ref 10–15)
ERYTHROCYTE [DISTWIDTH] IN BLOOD BY AUTOMATED COUNT: 16.5 % (ref 10–15)
GFR SERPL CREATININE-BSD FRML MDRD: 64 ML/MIN/1.73M2
GFR SERPL CREATININE-BSD FRML MDRD: 64 ML/MIN/1.7M2
GFR SERPL CREATININE-BSD FRML MDRD: 67 ML/MIN/1.7M2
GFR SERPL CREATININE-BSD FRML MDRD: 68 ML/MIN/1.73M2
GFR SERPL CREATININE-BSD FRML MDRD: 69 ML/MIN/1.73M2
GFR SERPL CREATININE-BSD FRML MDRD: 70 ML/MIN/1.7M2
GFR SERPL CREATININE-BSD FRML MDRD: 70 ML/MIN/1.7M2
GFR SERPL CREATININE-BSD FRML MDRD: 74 ML/MIN/1.7M2
GFR SERPL CREATININE-BSD FRML MDRD: 76 ML/MIN/1.73M2
GFR SERPL CREATININE-BSD FRML MDRD: 77 ML/MIN/1.73M2
GFR SERPL CREATININE-BSD FRML MDRD: 78 ML/MIN/1.73M2
GFR SERPL CREATININE-BSD FRML MDRD: 88 ML/MIN/1.73M2
GLUCOSE SERPL-MCNC: 136 MG/DL (ref 70–99)
GLUCOSE SERPL-MCNC: 79 MG/DL (ref 70–99)
GLUCOSE SERPL-MCNC: 80 MG/DL (ref 70–99)
GLUCOSE SERPL-MCNC: 82 MG/DL (ref 70–99)
GLUCOSE SERPL-MCNC: 84 MG/DL (ref 70–99)
GLUCOSE SERPL-MCNC: 85 MG/DL (ref 70–99)
GLUCOSE SERPL-MCNC: 87 MG/DL (ref 70–99)
GLUCOSE SERPL-MCNC: 89 MG/DL (ref 70–99)
GLUCOSE SERPL-MCNC: 90 MG/DL (ref 70–99)
GLUCOSE SERPL-MCNC: 97 MG/DL (ref 70–99)
GLUCOSE UR STRIP-MCNC: NEGATIVE MG/DL
HCT VFR BLD AUTO: 41.4 % (ref 35–47)
HCT VFR BLD AUTO: 42.4 % (ref 35–47)
HEMOGLOBIN: 12.5 G/DL (ref 11.7–15.7)
HGB BLD-MCNC: 12.6 G/DL (ref 11.7–15.7)
HGB UR QL STRIP: ABNORMAL
HYALINE CASTS #/AREA URNS LPF: 1 /LPF (ref 0–2)
IMM GRANULOCYTES # BLD: 0 10E9/L (ref 0–0.4)
IMM GRANULOCYTES NFR BLD: 0.7 %
INR PPP: 1.03 (ref 0.86–1.14)
INR PPP: 1.47 (ref 0.86–1.14)
INR PPP: 1.91 (ref 0.86–1.14)
INR PPP: 1.97 (ref 0.86–1.14)
INR PPP: 2.01 (ref 0.86–1.14)
INR PPP: 2.16 (ref 0.86–1.14)
INR PPP: 2.34 (ref 0.86–1.14)
INR PPP: 2.36 (ref 0.86–1.14)
INR PPP: 2.4 (ref 0.86–1.14)
INR PPP: 2.66 (ref 0.86–1.14)
INR PPP: 2.73 (ref 0.86–1.14)
INR PPP: 2.8 (ref 0.86–1.14)
INR PPP: 3.26 (ref 0.86–1.14)
INR PPP: 3.49 (ref 0.86–1.14)
INTERPRETATION ECG - MUSE: NORMAL
KETONES UR STRIP-MCNC: NEGATIVE MG/DL
LEUKOCYTE ESTERASE UR QL STRIP: ABNORMAL
LYMPHOCYTES # BLD AUTO: 1.7 10E9/L (ref 0.8–5.3)
LYMPHOCYTES NFR BLD AUTO: 27.5 %
Lab: ABNORMAL
MAGNESIUM SERPL-MCNC: 1.9 MG/DL (ref 1.6–2.3)
MAGNESIUM SERPL-MCNC: 2 MG/DL (ref 1.6–2.3)
MCH RBC QN AUTO: 25.6 PG (ref 26.5–33)
MCH RBC QN AUTO: 26 PG (ref 26.5–33)
MCHC RBC AUTO-ENTMCNC: 29.7 G/DL (ref 31.5–36.5)
MCHC RBC AUTO-ENTMCNC: 30.2 G/DL (ref 31.5–36.5)
MCV RBC AUTO: 85 FL (ref 78–100)
MCV RBC AUTO: 87 FL (ref 78–100)
MONOCYTES # BLD AUTO: 0.8 10E9/L (ref 0–1.3)
MONOCYTES NFR BLD AUTO: 12.8 %
NEUTROPHILS # BLD AUTO: 3.4 10E9/L (ref 1.6–8.3)
NEUTROPHILS NFR BLD AUTO: 55.6 %
NITRATE UR QL: POSITIVE
NRBC # BLD AUTO: 0 10*3/UL
NRBC BLD AUTO-RTO: 0 /100
NT-PROBNP SERPL-MCNC: 2475 PG/ML (ref 0–1800)
PH UR STRIP: 6 PH (ref 5–7)
PLATELET # BLD AUTO: 171 10E9/L (ref 150–450)
PLATELET # BLD AUTO: 172 10E9/L (ref 150–450)
POTASSIUM SERPL-SCNC: 3.8 MMOL/L (ref 3.4–5.3)
POTASSIUM SERPL-SCNC: 3.9 MMOL/L (ref 3.4–5.3)
POTASSIUM SERPL-SCNC: 3.9 MMOL/L (ref 3.4–5.3)
POTASSIUM SERPL-SCNC: 4 MMOL/L (ref 3.4–5.3)
POTASSIUM SERPL-SCNC: 4.1 MMOL/L (ref 3.4–5.3)
POTASSIUM SERPL-SCNC: 4.2 MMOL/L (ref 3.4–5.3)
POTASSIUM SERPL-SCNC: 4.3 MMOL/L (ref 3.4–5.3)
RBC # BLD AUTO: 4.85 10E12/L (ref 3.8–5.2)
RBC # BLD AUTO: 4.88 10E12/L (ref 3.8–5.2)
RBC #/AREA URNS AUTO: 1 /HPF (ref 0–2)
SODIUM SERPL-SCNC: 138 MMOL/L (ref 133–144)
SODIUM SERPL-SCNC: 139 MMOL/L (ref 133–144)
SODIUM SERPL-SCNC: 139 MMOL/L (ref 133–144)
SODIUM SERPL-SCNC: 140 MMOL/L (ref 133–144)
SODIUM SERPL-SCNC: 142 MMOL/L (ref 133–144)
SODIUM SERPL-SCNC: 144 MMOL/L (ref 133–144)
SOURCE: ABNORMAL
SP GR UR STRIP: 1.01 (ref 1–1.03)
SPECIMEN SOURCE: ABNORMAL
SQUAMOUS #/AREA URNS AUTO: 1 /HPF (ref 0–1)
TROPONIN I SERPL-MCNC: <0.015 UG/L (ref 0–0.04)
UROBILINOGEN UR STRIP-MCNC: 0 MG/DL (ref 0–2)
WBC # BLD AUTO: 6.1 10E9/L (ref 4–11)
WBC # BLD AUTO: 8 10E9/L (ref 4–11)
WBC #/AREA URNS AUTO: 6 /HPF (ref 0–5)

## 2018-01-01 PROCEDURE — 85025 COMPLETE CBC W/AUTO DIFF WBC: CPT | Performed by: EMERGENCY MEDICINE

## 2018-01-01 PROCEDURE — 12000007 ZZH R&B INTERMEDIATE

## 2018-01-01 PROCEDURE — 87186 SC STD MICRODIL/AGAR DIL: CPT | Performed by: INTERNAL MEDICINE

## 2018-01-01 PROCEDURE — 25000132 ZZH RX MED GY IP 250 OP 250 PS 637: Mod: GY | Performed by: INTERNAL MEDICINE

## 2018-01-01 PROCEDURE — 71046 X-RAY EXAM CHEST 2 VIEWS: CPT

## 2018-01-01 PROCEDURE — 99239 HOSP IP/OBS DSCHRG MGMT >30: CPT | Performed by: HOSPITALIST

## 2018-01-01 PROCEDURE — 99316 NF DSCHRG MGMT 30 MIN+: CPT | Performed by: NURSE PRACTITIONER

## 2018-01-01 PROCEDURE — 36415 COLL VENOUS BLD VENIPUNCTURE: CPT | Performed by: INTERNAL MEDICINE

## 2018-01-01 PROCEDURE — 99305 1ST NF CARE MODERATE MDM 35: CPT | Performed by: INTERNAL MEDICINE

## 2018-01-01 PROCEDURE — 85610 PROTHROMBIN TIME: CPT | Performed by: INTERNAL MEDICINE

## 2018-01-01 PROCEDURE — 83735 ASSAY OF MAGNESIUM: CPT | Performed by: INTERNAL MEDICINE

## 2018-01-01 PROCEDURE — 99358 PROLONG SERVICE W/O CONTACT: CPT | Performed by: NURSE PRACTITIONER

## 2018-01-01 PROCEDURE — 25000128 H RX IP 250 OP 636: Performed by: INTERNAL MEDICINE

## 2018-01-01 PROCEDURE — A9270 NON-COVERED ITEM OR SERVICE: HCPCS | Mod: GY | Performed by: INTERNAL MEDICINE

## 2018-01-01 PROCEDURE — 80048 BASIC METABOLIC PNL TOTAL CA: CPT | Performed by: INTERNAL MEDICINE

## 2018-01-01 PROCEDURE — 96365 THER/PROPH/DIAG IV INF INIT: CPT

## 2018-01-01 PROCEDURE — 87088 URINE BACTERIA CULTURE: CPT | Performed by: INTERNAL MEDICINE

## 2018-01-01 PROCEDURE — 93005 ELECTROCARDIOGRAM TRACING: CPT

## 2018-01-01 PROCEDURE — 25000128 H RX IP 250 OP 636: Performed by: EMERGENCY MEDICINE

## 2018-01-01 PROCEDURE — 99285 EMERGENCY DEPT VISIT HI MDM: CPT | Mod: 25

## 2018-01-01 PROCEDURE — A9270 NON-COVERED ITEM OR SERVICE: HCPCS | Mod: GY

## 2018-01-01 PROCEDURE — 25000132 ZZH RX MED GY IP 250 OP 250 PS 637: Mod: GY

## 2018-01-01 PROCEDURE — 99232 SBSQ HOSP IP/OBS MODERATE 35: CPT | Performed by: INTERNAL MEDICINE

## 2018-01-01 PROCEDURE — 99309 SBSQ NF CARE MODERATE MDM 30: CPT | Performed by: NURSE PRACTITIONER

## 2018-01-01 PROCEDURE — 84484 ASSAY OF TROPONIN QUANT: CPT | Performed by: EMERGENCY MEDICINE

## 2018-01-01 PROCEDURE — 93306 TTE W/DOPPLER COMPLETE: CPT | Mod: 26 | Performed by: INTERNAL MEDICINE

## 2018-01-01 PROCEDURE — 93306 TTE W/DOPPLER COMPLETE: CPT

## 2018-01-01 PROCEDURE — 81001 URINALYSIS AUTO W/SCOPE: CPT | Performed by: EMERGENCY MEDICINE

## 2018-01-01 PROCEDURE — 99308 SBSQ NF CARE LOW MDM 20: CPT | Performed by: NURSE PRACTITIONER

## 2018-01-01 PROCEDURE — 99222 1ST HOSP IP/OBS MODERATE 55: CPT | Performed by: INTERNAL MEDICINE

## 2018-01-01 PROCEDURE — 99233 SBSQ HOSP IP/OBS HIGH 50: CPT | Performed by: HOSPITALIST

## 2018-01-01 PROCEDURE — 80048 BASIC METABOLIC PNL TOTAL CA: CPT | Performed by: EMERGENCY MEDICINE

## 2018-01-01 PROCEDURE — 85610 PROTHROMBIN TIME: CPT | Performed by: EMERGENCY MEDICINE

## 2018-01-01 PROCEDURE — 25500064 ZZH RX 255 OP 636: Performed by: INTERNAL MEDICINE

## 2018-01-01 PROCEDURE — 99310 SBSQ NF CARE HIGH MDM 45: CPT | Performed by: NURSE PRACTITIONER

## 2018-01-01 PROCEDURE — 96375 TX/PRO/DX INJ NEW DRUG ADDON: CPT

## 2018-01-01 PROCEDURE — 99233 SBSQ HOSP IP/OBS HIGH 50: CPT | Performed by: INTERNAL MEDICINE

## 2018-01-01 PROCEDURE — 83735 ASSAY OF MAGNESIUM: CPT | Performed by: EMERGENCY MEDICINE

## 2018-01-01 PROCEDURE — 87086 URINE CULTURE/COLONY COUNT: CPT | Performed by: INTERNAL MEDICINE

## 2018-01-01 PROCEDURE — 83880 ASSAY OF NATRIURETIC PEPTIDE: CPT | Performed by: EMERGENCY MEDICINE

## 2018-01-01 PROCEDURE — 99223 1ST HOSP IP/OBS HIGH 75: CPT | Mod: AI | Performed by: INTERNAL MEDICINE

## 2018-01-01 RX ORDER — FUROSEMIDE 40 MG
40 TABLET ORAL 2 TIMES DAILY
Status: DISCONTINUED | OUTPATIENT
Start: 2018-01-01 | End: 2018-01-01 | Stop reason: HOSPADM

## 2018-01-01 RX ORDER — POTASSIUM CL/LIDO/0.9 % NACL 10MEQ/0.1L
10 INTRAVENOUS SOLUTION, PIGGYBACK (ML) INTRAVENOUS
Status: DISCONTINUED | OUTPATIENT
Start: 2018-01-01 | End: 2018-01-01 | Stop reason: HOSPADM

## 2018-01-01 RX ORDER — FUROSEMIDE 40 MG
40 TABLET ORAL DAILY
Status: DISCONTINUED | OUTPATIENT
Start: 2018-01-01 | End: 2018-01-01

## 2018-01-01 RX ORDER — WARFARIN SODIUM 3 MG/1
3 TABLET ORAL
Status: COMPLETED | OUTPATIENT
Start: 2018-01-01 | End: 2018-01-01

## 2018-01-01 RX ORDER — AMOXICILLIN 250 MG
1 CAPSULE ORAL 2 TIMES DAILY PRN
Status: DISCONTINUED | OUTPATIENT
Start: 2018-01-01 | End: 2018-01-01 | Stop reason: HOSPADM

## 2018-01-01 RX ORDER — AMPICILLIN TRIHYDRATE 500 MG
500 CAPSULE ORAL 2 TIMES DAILY
COMMUNITY
End: 2018-01-01

## 2018-01-01 RX ORDER — ACETAMINOPHEN 650 MG/1
650 SUPPOSITORY RECTAL EVERY 4 HOURS PRN
COMMUNITY

## 2018-01-01 RX ORDER — BISACODYL 10 MG
10 SUPPOSITORY, RECTAL RECTAL DAILY PRN
Status: DISCONTINUED | OUTPATIENT
Start: 2018-01-01 | End: 2018-01-01 | Stop reason: HOSPADM

## 2018-01-01 RX ORDER — WARFARIN SODIUM 2.5 MG/1
2.5 TABLET ORAL
Status: COMPLETED | OUTPATIENT
Start: 2018-01-01 | End: 2018-01-01

## 2018-01-01 RX ORDER — FUROSEMIDE 40 MG
40 TABLET ORAL 2 TIMES DAILY
Status: DISCONTINUED | OUTPATIENT
Start: 2018-01-01 | End: 2018-01-01

## 2018-01-01 RX ORDER — ALBUTEROL SULFATE 90 UG/1
2 AEROSOL, METERED RESPIRATORY (INHALATION) 4 TIMES DAILY PRN
COMMUNITY
End: 2018-01-01

## 2018-01-01 RX ORDER — ALBUTEROL SULFATE 90 UG/1
2 AEROSOL, METERED RESPIRATORY (INHALATION) 4 TIMES DAILY
COMMUNITY
End: 2018-01-01

## 2018-01-01 RX ORDER — WARFARIN SODIUM 2.5 MG/1
2.5 TABLET ORAL DAILY
COMMUNITY
End: 2018-01-01 | Stop reason: SINTOL

## 2018-01-01 RX ORDER — POTASSIUM CHLORIDE 29.8 MG/ML
20 INJECTION INTRAVENOUS
Status: DISCONTINUED | OUTPATIENT
Start: 2018-01-01 | End: 2018-01-01 | Stop reason: HOSPADM

## 2018-01-01 RX ORDER — PROCHLORPERAZINE 25 MG
12.5 SUPPOSITORY, RECTAL RECTAL EVERY 12 HOURS PRN
Status: DISCONTINUED | OUTPATIENT
Start: 2018-01-01 | End: 2018-01-01 | Stop reason: HOSPADM

## 2018-01-01 RX ORDER — POTASSIUM CHLORIDE 1500 MG/1
20-40 TABLET, EXTENDED RELEASE ORAL
Status: DISCONTINUED | OUTPATIENT
Start: 2018-01-01 | End: 2018-01-01 | Stop reason: HOSPADM

## 2018-01-01 RX ORDER — CEPHALEXIN 500 MG/1
500 CAPSULE ORAL 3 TIMES DAILY
Qty: 15 CAPSULE | Refills: 0 | Status: SHIPPED | OUTPATIENT
Start: 2018-01-01 | End: 2018-01-01

## 2018-01-01 RX ORDER — MAGNESIUM SULFATE HEPTAHYDRATE 40 MG/ML
4 INJECTION, SOLUTION INTRAVENOUS EVERY 4 HOURS PRN
Status: DISCONTINUED | OUTPATIENT
Start: 2018-01-01 | End: 2018-01-01 | Stop reason: HOSPADM

## 2018-01-01 RX ORDER — PROCHLORPERAZINE MALEATE 5 MG
5 TABLET ORAL EVERY 6 HOURS PRN
Status: DISCONTINUED | OUTPATIENT
Start: 2018-01-01 | End: 2018-01-01 | Stop reason: HOSPADM

## 2018-01-01 RX ORDER — ACETAMINOPHEN 500 MG
500 TABLET ORAL 3 TIMES DAILY PRN
Status: DISCONTINUED | OUTPATIENT
Start: 2018-01-01 | End: 2018-01-01 | Stop reason: HOSPADM

## 2018-01-01 RX ORDER — ONDANSETRON 4 MG/1
4 TABLET, ORALLY DISINTEGRATING ORAL EVERY 6 HOURS PRN
Status: DISCONTINUED | OUTPATIENT
Start: 2018-01-01 | End: 2018-01-01 | Stop reason: HOSPADM

## 2018-01-01 RX ORDER — LOSARTAN POTASSIUM 100 MG/1
100 TABLET ORAL EVERY MORNING
Status: DISCONTINUED | OUTPATIENT
Start: 2018-01-01 | End: 2018-01-01 | Stop reason: HOSPADM

## 2018-01-01 RX ORDER — TROLAMINE SALICYLATE 10 G/100G
CREAM TOPICAL 3 TIMES DAILY
COMMUNITY

## 2018-01-01 RX ORDER — IPRATROPIUM BROMIDE AND ALBUTEROL SULFATE 2.5; .5 MG/3ML; MG/3ML
1 SOLUTION RESPIRATORY (INHALATION) 4 TIMES DAILY
COMMUNITY
End: 2018-01-01

## 2018-01-01 RX ORDER — POTASSIUM CHLORIDE 1.5 G/1.58G
20-40 POWDER, FOR SOLUTION ORAL
Status: DISCONTINUED | OUTPATIENT
Start: 2018-01-01 | End: 2018-01-01 | Stop reason: HOSPADM

## 2018-01-01 RX ORDER — ALBUTEROL SULFATE 0.83 MG/ML
1 SOLUTION RESPIRATORY (INHALATION) EVERY 4 HOURS PRN
COMMUNITY
End: 2018-01-01

## 2018-01-01 RX ORDER — CITALOPRAM HYDROBROMIDE 20 MG/1
20 TABLET ORAL EVERY MORNING
Status: DISCONTINUED | OUTPATIENT
Start: 2018-01-01 | End: 2018-01-01 | Stop reason: HOSPADM

## 2018-01-01 RX ORDER — IPRATROPIUM BROMIDE AND ALBUTEROL SULFATE 2.5; .5 MG/3ML; MG/3ML
1 SOLUTION RESPIRATORY (INHALATION) 4 TIMES DAILY
COMMUNITY
Start: 2018-01-01 | End: 2018-01-01

## 2018-01-01 RX ORDER — POTASSIUM CHLORIDE 7.45 MG/ML
10 INJECTION INTRAVENOUS
Status: DISCONTINUED | OUTPATIENT
Start: 2018-01-01 | End: 2018-01-01 | Stop reason: HOSPADM

## 2018-01-01 RX ORDER — AMOXICILLIN 250 MG
1 CAPSULE ORAL DAILY PRN
COMMUNITY
End: 2018-01-01

## 2018-01-01 RX ORDER — CEFTRIAXONE 1 G/1
1 INJECTION, POWDER, FOR SOLUTION INTRAMUSCULAR; INTRAVENOUS ONCE
Status: COMPLETED | OUTPATIENT
Start: 2018-01-01 | End: 2018-01-01

## 2018-01-01 RX ORDER — ATROPINE SULFATE 10 MG/ML
2 SOLUTION/ DROPS OPHTHALMIC
COMMUNITY

## 2018-01-01 RX ORDER — BISACODYL 10 MG
10 SUPPOSITORY, RECTAL RECTAL DAILY PRN
COMMUNITY

## 2018-01-01 RX ORDER — ATENOLOL 50 MG/1
50 TABLET ORAL 2 TIMES DAILY
Status: DISCONTINUED | OUTPATIENT
Start: 2018-01-01 | End: 2018-01-01 | Stop reason: HOSPADM

## 2018-01-01 RX ORDER — GUAIFENESIN AND DEXTROMETHORPHAN HYDROBROMIDE 100; 10 MG/5ML; MG/5ML
10 SOLUTION ORAL EVERY 4 HOURS PRN
COMMUNITY
End: 2018-01-01

## 2018-01-01 RX ORDER — GUAIFENESIN AND DEXTROMETHORPHAN HYDROBROMIDE 100; 10 MG/5ML; MG/5ML
10 SOLUTION ORAL EVERY 4 HOURS PRN
Status: DISCONTINUED | OUTPATIENT
Start: 2018-01-01 | End: 2018-01-01 | Stop reason: HOSPADM

## 2018-01-01 RX ORDER — POLYETHYLENE GLYCOL 3350 17 G/17G
17 POWDER, FOR SOLUTION ORAL DAILY PRN
Status: DISCONTINUED | OUTPATIENT
Start: 2018-01-01 | End: 2018-01-01 | Stop reason: HOSPADM

## 2018-01-01 RX ORDER — ONDANSETRON 2 MG/ML
4 INJECTION INTRAMUSCULAR; INTRAVENOUS EVERY 6 HOURS PRN
Status: DISCONTINUED | OUTPATIENT
Start: 2018-01-01 | End: 2018-01-01 | Stop reason: HOSPADM

## 2018-01-01 RX ORDER — FUROSEMIDE 10 MG/ML
40 INJECTION INTRAMUSCULAR; INTRAVENOUS ONCE
Status: COMPLETED | OUTPATIENT
Start: 2018-01-01 | End: 2018-01-01

## 2018-01-01 RX ORDER — AMOXICILLIN 250 MG
2 CAPSULE ORAL 2 TIMES DAILY PRN
Status: DISCONTINUED | OUTPATIENT
Start: 2018-01-01 | End: 2018-01-01 | Stop reason: HOSPADM

## 2018-01-01 RX ORDER — GUAIFENESIN AND DEXTROMETHORPHAN HYDROBROMIDE 100; 10 MG/5ML; MG/5ML
10 SOLUTION ORAL EVERY 4 HOURS PRN
COMMUNITY

## 2018-01-01 RX ORDER — WARFARIN SODIUM 2.5 MG/1
2.5 TABLET ORAL
Status: DISCONTINUED | OUTPATIENT
Start: 2018-01-01 | End: 2018-01-01 | Stop reason: HOSPADM

## 2018-01-01 RX ORDER — FUROSEMIDE 10 MG/ML
40 INJECTION INTRAMUSCULAR; INTRAVENOUS EVERY 12 HOURS
Status: COMPLETED | OUTPATIENT
Start: 2018-01-01 | End: 2018-01-01

## 2018-01-01 RX ORDER — NALOXONE HYDROCHLORIDE 0.4 MG/ML
.1-.4 INJECTION, SOLUTION INTRAMUSCULAR; INTRAVENOUS; SUBCUTANEOUS
Status: DISCONTINUED | OUTPATIENT
Start: 2018-01-01 | End: 2018-01-01 | Stop reason: HOSPADM

## 2018-01-01 RX ORDER — CEFTRIAXONE 1 G/1
1 INJECTION, POWDER, FOR SOLUTION INTRAMUSCULAR; INTRAVENOUS EVERY 24 HOURS
Status: DISCONTINUED | OUTPATIENT
Start: 2018-01-01 | End: 2018-01-01 | Stop reason: HOSPADM

## 2018-01-01 RX ADMIN — FUROSEMIDE 40 MG: 40 TABLET ORAL at 09:05

## 2018-01-01 RX ADMIN — ACETAMINOPHEN 500 MG: 500 TABLET, FILM COATED ORAL at 13:56

## 2018-01-01 RX ADMIN — CITALOPRAM HYDROBROMIDE 20 MG: 20 TABLET ORAL at 10:16

## 2018-01-01 RX ADMIN — WARFARIN SODIUM 2.5 MG: 2.5 TABLET ORAL at 17:29

## 2018-01-01 RX ADMIN — CEFTRIAXONE SODIUM 1 G: 1 INJECTION, POWDER, FOR SOLUTION INTRAMUSCULAR; INTRAVENOUS at 13:01

## 2018-01-01 RX ADMIN — ATENOLOL 50 MG: 50 TABLET ORAL at 21:10

## 2018-01-01 RX ADMIN — FUROSEMIDE 40 MG: 10 INJECTION, SOLUTION INTRAVENOUS at 14:33

## 2018-01-01 RX ADMIN — CHOLECALCIFEROL CAP 125 MCG (5000 UNIT) 5000 UNITS: 125 CAP at 10:16

## 2018-01-01 RX ADMIN — CITALOPRAM HYDROBROMIDE 20 MG: 20 TABLET ORAL at 09:05

## 2018-01-01 RX ADMIN — CHOLECALCIFEROL CAP 125 MCG (5000 UNIT) 5000 UNITS: 125 CAP at 09:16

## 2018-01-01 RX ADMIN — FUROSEMIDE 40 MG: 40 TABLET ORAL at 09:17

## 2018-01-01 RX ADMIN — WARFARIN SODIUM 2.5 MG: 2.5 TABLET ORAL at 17:41

## 2018-01-01 RX ADMIN — FUROSEMIDE 40 MG: 40 TABLET ORAL at 21:03

## 2018-01-01 RX ADMIN — ATENOLOL 50 MG: 50 TABLET ORAL at 22:58

## 2018-01-01 RX ADMIN — LOSARTAN POTASSIUM 100 MG: 100 TABLET ORAL at 09:05

## 2018-01-01 RX ADMIN — CEFTRIAXONE SODIUM 1 G: 1 INJECTION, POWDER, FOR SOLUTION INTRAMUSCULAR; INTRAVENOUS at 12:49

## 2018-01-01 RX ADMIN — ATENOLOL 50 MG: 50 TABLET ORAL at 10:16

## 2018-01-01 RX ADMIN — CHOLECALCIFEROL CAP 125 MCG (5000 UNIT) 5000 UNITS: 125 CAP at 09:05

## 2018-01-01 RX ADMIN — FUROSEMIDE 40 MG: 40 TABLET ORAL at 10:16

## 2018-01-01 RX ADMIN — CEFTRIAXONE SODIUM 1 G: 1 INJECTION, POWDER, FOR SOLUTION INTRAMUSCULAR; INTRAVENOUS at 13:27

## 2018-01-01 RX ADMIN — FUROSEMIDE 40 MG: 40 TABLET ORAL at 20:49

## 2018-01-01 RX ADMIN — WARFARIN SODIUM 2.5 MG: 2.5 TABLET ORAL at 17:26

## 2018-01-01 RX ADMIN — ATENOLOL 50 MG: 50 TABLET ORAL at 20:49

## 2018-01-01 RX ADMIN — CEFTRIAXONE SODIUM 1 G: 1 INJECTION, POWDER, FOR SOLUTION INTRAMUSCULAR; INTRAVENOUS at 13:25

## 2018-01-01 RX ADMIN — CITALOPRAM HYDROBROMIDE 20 MG: 20 TABLET ORAL at 09:17

## 2018-01-01 RX ADMIN — FUROSEMIDE 40 MG: 10 INJECTION, SOLUTION INTRAVENOUS at 13:50

## 2018-01-01 RX ADMIN — CHOLECALCIFEROL CAP 125 MCG (5000 UNIT) 5000 UNITS: 125 CAP at 09:17

## 2018-01-01 RX ADMIN — WARFARIN SODIUM 3 MG: 3 TABLET ORAL at 18:13

## 2018-01-01 RX ADMIN — CITALOPRAM HYDROBROMIDE 20 MG: 20 TABLET ORAL at 09:16

## 2018-01-01 RX ADMIN — LOSARTAN POTASSIUM 100 MG: 100 TABLET ORAL at 09:16

## 2018-01-01 RX ADMIN — LOSARTAN POTASSIUM 100 MG: 100 TABLET ORAL at 10:16

## 2018-01-01 RX ADMIN — ACETAMINOPHEN 500 MG: 500 TABLET, FILM COATED ORAL at 09:35

## 2018-01-01 RX ADMIN — HUMAN ALBUMIN MICROSPHERES AND PERFLUTREN 3 ML: 10; .22 INJECTION, SOLUTION INTRAVENOUS at 10:31

## 2018-01-01 RX ADMIN — FUROSEMIDE 40 MG: 10 INJECTION, SOLUTION INTRAVENOUS at 02:14

## 2018-01-01 RX ADMIN — LOSARTAN POTASSIUM 100 MG: 100 TABLET ORAL at 09:17

## 2018-01-01 ASSESSMENT — ACTIVITIES OF DAILY LIVING (ADL)
RETIRED_EATING: 0-->INDEPENDENT
ADLS_ACUITY_SCORE: 25
SWALLOWING: 0-->SWALLOWS FOODS/LIQUIDS WITHOUT DIFFICULTY
AMBULATION: 3 - ASSISTIVE EQUIPMENT AND PERSON
ADLS_ACUITY_SCORE: 25
ADLS_ACUITY_SCORE: 29
ADLS_ACUITY_SCORE: 17
TOILETING: 2-->ASSISTIVE PERSON
WHICH_OF_THE_ABOVE_FUNCTIONAL_RISKS_HAD_A_RECENT_ONSET_OR_CHANGE?: AMBULATION;TRANSFERRING;TOILETING;BATHING;DRESSING;FALL HISTORY
ADLS_ACUITY_SCORE: 29
BATHING: 2 - ASSISTIVE PERSON
COMMUNICATION: 2 - DIFFICULTY UNDERSTANDING AND SPEAKING (NOT RELATED TO LANGUAGE BARRIER)
ADLS_ACUITY_SCORE: 29
ADLS_ACUITY_SCORE: 25
COGNITION: 2 - DIFFICULTY WITH ORGANIZING THOUGHTS
ADLS_ACUITY_SCORE: 29
ADLS_ACUITY_SCORE: 25
ADLS_ACUITY_SCORE: 29
ADLS_ACUITY_SCORE: 25
FALL_HISTORY_WITHIN_LAST_SIX_MONTHS: YES
ADLS_ACUITY_SCORE: 25
TOILETING: 3 - ASSISTIVE EQUIPMENT AND PERSON
ADLS_ACUITY_SCORE: 25
TRANSFERRING: 3 - ASSISTIVE EQUIPMENT AND PERSON
CHANGE_IN_FUNCTIONAL_STATUS_SINCE_ONSET_OF_CURRENT_ILLNESS/INJURY: YES
ADLS_ACUITY_SCORE: 25
SWALLOWING: 0 - SWALLOWS FOODS/LIQUIDS WITHOUT DIFFICULTY
ADLS_ACUITY_SCORE: 25
AMBULATION: 3-->ASSISTIVE EQUIPMENT AND PERSON
DRESS: 2-->ASSISTIVE PERSON
TRANSFERRING: 3-->ASSISTIVE EQUIPMENT AND PERSON
ADLS_ACUITY_SCORE: 25
DRESS: 2 - ASSISTIVE PERSON
EATING: 0 - INDEPENDENT
ADLS_ACUITY_SCORE: 27
NUMBER_OF_TIMES_PATIENT_HAS_FALLEN_WITHIN_LAST_SIX_MONTHS: 2
RETIRED_COMMUNICATION: 2-->DIFFICULTY UNDERSTANDING AND SPEAKING (NOT RELATED TO LANGUAGE BARRIER)
BATHING: 2-->ASSISTIVE PERSON
ADLS_ACUITY_SCORE: 25

## 2018-01-08 ENCOUNTER — COMMUNICATION - HEALTHEAST (OUTPATIENT)
Dept: NURSING | Facility: CLINIC | Age: 83
End: 2018-01-08

## 2018-01-10 ENCOUNTER — COMMUNICATION - HEALTHEAST (OUTPATIENT)
Dept: NURSING | Facility: CLINIC | Age: 83
End: 2018-01-10

## 2018-01-10 ENCOUNTER — AMBULATORY - HEALTHEAST (OUTPATIENT)
Dept: LAB | Facility: CLINIC | Age: 83
End: 2018-01-10

## 2018-01-10 DIAGNOSIS — I48.91 ATRIAL FIBRILLATION (H): ICD-10-CM

## 2018-01-10 LAB — INR PPP: 2.2 (ref 0.9–1.1)

## 2018-01-17 ENCOUNTER — RECORDS - HEALTHEAST (OUTPATIENT)
Dept: ADMINISTRATIVE | Facility: OTHER | Age: 83
End: 2018-01-17

## 2018-01-18 ENCOUNTER — RECORDS - HEALTHEAST (OUTPATIENT)
Dept: ADMINISTRATIVE | Facility: OTHER | Age: 83
End: 2018-01-18

## 2018-01-23 ENCOUNTER — RECORDS - HEALTHEAST (OUTPATIENT)
Dept: ADMINISTRATIVE | Facility: OTHER | Age: 83
End: 2018-01-23

## 2018-01-24 ENCOUNTER — OFFICE VISIT - HEALTHEAST (OUTPATIENT)
Dept: GERIATRICS | Facility: CLINIC | Age: 83
End: 2018-01-24

## 2018-01-24 ENCOUNTER — AMBULATORY - HEALTHEAST (OUTPATIENT)
Dept: GERIATRICS | Facility: CLINIC | Age: 83
End: 2018-01-24

## 2018-01-24 DIAGNOSIS — R53.81 PHYSICAL DECONDITIONING: ICD-10-CM

## 2018-01-24 DIAGNOSIS — Z87.09 HISTORY OF INFLUENZA: ICD-10-CM

## 2018-01-26 ENCOUNTER — OFFICE VISIT - HEALTHEAST (OUTPATIENT)
Dept: GERIATRICS | Facility: CLINIC | Age: 83
End: 2018-01-26

## 2018-01-26 DIAGNOSIS — I35.0 CALCIFIC AORTIC VALVE STENOSIS: ICD-10-CM

## 2018-01-26 DIAGNOSIS — Z87.09 HISTORY OF INFLUENZA: ICD-10-CM

## 2018-01-26 DIAGNOSIS — I48.20 CHRONIC ATRIAL FIBRILLATION (H): ICD-10-CM

## 2018-01-26 DIAGNOSIS — R60.9 EDEMA, UNSPECIFIED TYPE: ICD-10-CM

## 2018-01-29 ENCOUNTER — AMBULATORY - HEALTHEAST (OUTPATIENT)
Dept: GERIATRICS | Facility: CLINIC | Age: 83
End: 2018-01-29

## 2018-01-29 ENCOUNTER — RECORDS - HEALTHEAST (OUTPATIENT)
Dept: LAB | Facility: CLINIC | Age: 83
End: 2018-01-29

## 2018-01-29 LAB
ANION GAP SERPL CALCULATED.3IONS-SCNC: 7 MMOL/L (ref 5–18)
BUN SERPL-MCNC: 17 MG/DL (ref 8–28)
CALCIUM SERPL-MCNC: 8.8 MG/DL (ref 8.5–10.5)
CHLORIDE BLD-SCNC: 103 MMOL/L (ref 98–107)
CO2 SERPL-SCNC: 33 MMOL/L (ref 22–31)
CREAT SERPL-MCNC: 0.64 MG/DL (ref 0.6–1.1)
ERYTHROCYTE [DISTWIDTH] IN BLOOD BY AUTOMATED COUNT: 15.7 % (ref 11–14.5)
GFR SERPL CREATININE-BSD FRML MDRD: >60 ML/MIN/1.73M2
GLUCOSE BLD-MCNC: 79 MG/DL (ref 70–125)
HCT VFR BLD AUTO: 39.1 % (ref 35–47)
HGB BLD-MCNC: 12 G/DL (ref 12–16)
MCH RBC QN AUTO: 26.9 PG (ref 27–34)
MCHC RBC AUTO-ENTMCNC: 30.7 G/DL (ref 32–36)
MCV RBC AUTO: 88 FL (ref 80–100)
PLATELET # BLD AUTO: 179 THOU/UL (ref 140–440)
PMV BLD AUTO: 12.1 FL (ref 8.5–12.5)
POTASSIUM BLD-SCNC: 3.4 MMOL/L (ref 3.5–5)
RBC # BLD AUTO: 4.46 MILL/UL (ref 3.8–5.4)
SODIUM SERPL-SCNC: 143 MMOL/L (ref 136–145)
WBC: 8.7 THOU/UL (ref 4–11)

## 2018-01-29 RX ORDER — POTASSIUM CHLORIDE 1500 MG/1
20 TABLET, EXTENDED RELEASE ORAL DAILY
Status: SHIPPED | COMMUNITY
Start: 2018-01-29

## 2018-01-30 ENCOUNTER — OFFICE VISIT - HEALTHEAST (OUTPATIENT)
Dept: GERIATRICS | Facility: CLINIC | Age: 83
End: 2018-01-30

## 2018-01-30 DIAGNOSIS — R60.9 EDEMA: ICD-10-CM

## 2018-01-30 DIAGNOSIS — Z90.5 S/P NEPHRECTOMY: ICD-10-CM

## 2018-01-30 DIAGNOSIS — R53.81 PHYSICAL DECONDITIONING: ICD-10-CM

## 2018-01-30 DIAGNOSIS — Z87.09 HISTORY OF INFLUENZA: ICD-10-CM

## 2018-01-31 NOTE — PROGRESS NOTES
[facility]  PRIMARY CARE PROVIDER AND CLINIC:  No primary care provider on file. No primary physician on file.  Chief Complaint   Patient presents with     Hospital F/U       HPI:    Mervat Keen is a 87 year old  (5/1/1930),admitted to the [facility] from [facility]  stay 01/23/2018 through 01/30/3018. And [facility] stay 01/18/2018 through 01/23/2018.  Admitted to this facility for  rehab, medical management and nursing care.  HPI information obtained from: facility chart records.  Current issues are:         History of influenza  Edema  Calcific aortic valve stenosis  Chronic atrial fibrillation (H)  Physical deconditioning    Nursing report, patient O2 sats dropped 84 at night was put on oxygen 2 liters. Seems to be okay while awake on room air. Denies sob, or chest pain.      Patient was hospitalized at [facility] on 1/18/18 for Influenza A after testing positive in urgent care and worsening symptoms. She was treated with Tamiflu and prednisone. Completed 5 days of Tamiflu and was [facility]       CODE STATUS/ADVANCE DIRECTIVES DISCUSSION:   DNR only  Patient's living condition: lives with family, m     ALLERGIES:Lisinopril and Morphine  PAST MEDICAL HISTORY:  has no past medical history on file.  PAST SURGICAL HISTORY:  has no past surgical history on file.  FAMILY HISTORY: family history is not on file.  SOCIAL HISTORY:      Post Discharge Medication Reconciliation Status: discharge medications reconciled and changed, per note/orders (see AVS).  Current Outpatient Prescriptions   Medication Sig Dispense Refill     albuterol (PROAIR HFA/PROVENTIL HFA/VENTOLIN HFA) 108 (90 BASE) MCG/ACT Inhaler Inhale 2 puffs into the lungs 4 times daily       VITAMIN D, CHOLECALCIFEROL, PO Take 5,000 Units by mouth daily       hydrocortisone (ANUSOL-HC) 2.5 % cream Place 1 applicator rectally 2 times daily as needed for hemorrhoids        "FUROSEMIDE PO Take 20 mg by mouth every morning       albuterol (2.5 MG/3ML) 0.083% neb solution Take 1 vial by nebulization every 4 hours as needed       ONDANSETRON PO Take 4 mg by mouth every 8 hours as needed for nausea or vomiting       ATENOLOL PO Take 50 mg by mouth 2 times daily       ipratropium - albuterol 0.5 mg/2.5 mg/3 mL (DUONEB) 0.5-2.5 (3) MG/3ML neb solution Take 1 vial by nebulization 4 times daily       CITALOPRAM HYDROBROMIDE PO Take 20 mg by mouth daily       MELATONIN PO Take 3 mg by mouth nightly as needed       BENZONATATE PO Take 100 mg by mouth every 4 hours as needed for cough       Dextromethorphan-guaiFENesin  MG/5ML syrup Take 10 mLs by mouth every 4 hours as needed for cough       LOSARTAN POTASSIUM PO Take 100 mg by mouth every morning         ROS:  4 point ROS including Respiratory, CV, GI and , other than that noted in the HPI,  is negative    Exam:  /67  Pulse 63  Temp 98.6  F (37  C)  Resp 17  Ht 5' 8\" (1.727 m)  Wt 194 lb (88 kg)  SpO2 93%  BMI 29.5 kg/m2  GENERAL APPEARANCE:  Alert, in no distress, appears healthy, cooperative, sitting in recliner with daughter and grand daughter at bedsied  ENT:  Mouth and posterior oropharynx normal, moist mucous membranes, [place]  EYES:  EOM, conjunctivae, lids, pupils and irises normal, EOM normal, conjunctiva and lids normal, eye glasses present  RESP:  respiratory effort and palpation of chest normal, lungs  Fine crackles left base, diminished right base, no respiratory distress  CV:  Palpation and auscultation of heart done , regular rate and rhythm, + murmur, rub, or gallop, trace edema BLE  ABDOMEN:  normal bowel sounds, soft, nontender, no hepatosplenomegaly or other masses, no guarding or rebound  M/S:   Gait and station abnormal unsteady gait  Digits and nails normal  SKIN:  Inspection of skin and subcutaneous tissue baseline, Palpation of skin and subcutaneous tissue baseline  NEURO:   Cranial nerves 2-12 are " normal tested and grossly at patient's baseline  PSYCH:  oriented to x2, insight and judgement impaired, memory impaired , affect and mood normal    BP 01/30-01/31: 131-172/ mmHg    Lab/Diagnostic data: no labs available    ASSESSMENT/PLAN:  (Z87.09) History of influenza  (primary encounter diagnosis)  Comment: Improving. Inpatient required supplemental oxygen, Tx with Tamiflu and prednisone. Discharged to [facility], now transferred to  per family preference.  -today she is on RA. Required oxygen last night.  Plan: add duonebs qid x 5 days  -encourage oral fluids  -wean oxygen as able.  -no need for isolation at this time    CHF  (I35.0) Calcific aortic valve stenosis(R60.9)   Comment: stable. Last EF 55-60% 2/17. some crackles to the bases.  Plan: daily weights  -TEDs on AM off PM  -cont furosemide 40 mg daily  -recheck bmp/cbc 2/1  -add KCL 20 meq daily    HTN  Controlled.   Cont atenolol, losartan, and furosemide  -VS per unit protocol    (I48.2) Chronic atrial fibrillation (H)  Comment: rate controlled. INR today 2.5  Plan: cont coumadin 2 mg daily  -recheck INR 2/2    Cognitive impairment  Needs assistance with cares. At home daughter has been helping her  -recommended f/u with neurology for evaluation  -nursing supportive care  -monitor hydration  -falls risk    (R53.81) Physical deconditioning  Comment: s/s influenza and recent hospitalization. Lives with family  Plan: PT/OT  - for discharge planning and care conference      Orders:  -Give extra dose on furosemide 20 mg now  -bmp  -daily weights notify provider if up 3lbs/day or 5 lbs/week  -wean oxygen as able  -Keep oxygen sats >92 %      Total time spent with patient visit at the skilled nursing facility was 45 min including patient visit, review of past records and dicussed with daughter on phone for medication clarification as well as grand daughter at bedside . Greater than 50% of total time spent with counseling and coordinating care due to  CHF, fall risk, dementia    Electronically signed by:  Jil Fine

## 2018-02-01 NOTE — PROGRESS NOTES
[facility]    Chief Complaint   Patient presents with     Nursing Home Acute     Shortness of Breath       HPI:    Yolanda Keen is a 87 year old  (5/1/1930), who is being seen today for an episodic care visit at [facility].  HPI information obtained from: facility chart records.Today's concern is:     SOB (shortness of breath)  Influenza A  Congestive heart failure, unspecified congestive heart failure chronicity, unspecified congestive heart failure type (H)     Per nsg patient with SOB in middle night last night and found to have O2 sats 80s. O2 applied per JOANNA. Today patient reports mild SMITH, no SOB rest and VSS. O2 is off with sats > 90%. Ate today and worked with therapy.  Per patient daughter this has been going on intermittently of late. Patient is recovering from Influenza, and with CHF on xray inpatient with last EF 55-60%. Lasix has been resumed and increased of late and weight trending down. Patient is on sched Duonebs and per daughter these seem to be helfpul.     ALLERGIES: Lisinopril and Morphine  Past Medical, Surgical, Family and Social History reviewed and updated in [facility].    Current Outpatient Prescriptions   Medication Sig Dispense Refill     albuterol (PROAIR HFA/PROVENTIL HFA/VENTOLIN HFA) 108 (90 BASE) MCG/ACT Inhaler Inhale 2 puffs into the lungs 4 times daily       Cholecalciferol 5000 UNITS TABS Take 1 capsule by mouth every morning       hydrocortisone (ANUSOL-HC) 2.5 % cream Place 1 applicator rectally 2 times daily as needed for hemorrhoids       albuterol (2.5 MG/3ML) 0.083% neb solution Take 1 vial by nebulization every 4 hours as needed for shortness of breath / dyspnea or wheezing       ONDANSETRON PO Take 4 mg by mouth every 8 hours as needed for nausea or vomiting       ipratropium - albuterol 0.5 mg/2.5 mg/3 mL (DUONEB) 0.5-2.5 (3) MG/3ML neb solution Take 1 vial by nebulization 4 times daily       CITALOPRAM HYDROBROMIDE PO Take 20  mg by mouth every morning       MELATONIN PO Take 3 mg by mouth nightly as needed       BENZONATATE PO Take by mouth every 4 hours as needed for cough       Dextromethorphan-guaiFENesin  MG/5ML syrup Take 10 mLs by mouth every 4 hours as needed for cough       FUROSEMIDE PO Take 40 mg by mouth every morning       LOSARTAN POTASSIUM PO Take 100 mg by mouth every morning       ATENOLOL PO Take 50 mg by mouth 2 times daily       WARFARIN SODIUM PO Take 2 mg by mouth daily until 02/01; check INR 02/02       ipratropium - albuterol 0.5 mg/2.5 mg/3 mL (DUONEB) 0.5-2.5 (3) MG/3ML neb solution Take 1 vial by nebulization 4 times daily for COPD for 5 days       Potassium (POTASSIMIN PO) Take 20 mEq by mouth every morning       Medications reviewed:  Medications reconciled to facility chart and changes were made to reflect current medications as identified as above med list. Below are the changes that were made:   Medications stopped since last EPIC medication reconciliation:   There are no discontinued medications.    Medications started since last [facility] medication reconciliation:  Orders Placed This Encounter   Medications     albuterol (PROAIR HFA/PROVENTIL HFA/VENTOLIN HFA) 108 (90 BASE) MCG/ACT Inhaler     Sig: Inhale 2 puffs into the lungs 4 times daily     Cholecalciferol 5000 UNITS TABS     Sig: Take 1 capsule by mouth every morning     hydrocortisone (ANUSOL-HC) 2.5 % cream     Sig: Place 1 applicator rectally 2 times daily as needed for hemorrhoids     albuterol (2.5 MG/3ML) 0.083% neb solution     Sig: Take 1 vial by nebulization every 4 hours as needed for shortness of breath / dyspnea or wheezing     ONDANSETRON PO     Sig: Take 4 mg by mouth every 8 hours as needed for nausea or vomiting     ipratropium - albuterol 0.5 mg/2.5 mg/3 mL (DUONEB) 0.5-2.5 (3) MG/3ML neb solution     Sig: Take 1 vial by nebulization 4 times daily     CITALOPRAM HYDROBROMIDE PO     Sig: Take 20 mg by mouth every morning      "MELATONIN PO     Sig: Take 3 mg by mouth nightly as needed     BENZONATATE PO     Sig: Take by mouth every 4 hours as needed for cough     Dextromethorphan-guaiFENesin  MG/5ML syrup     Sig: Take 10 mLs by mouth every 4 hours as needed for cough     FUROSEMIDE PO     Sig: Take 40 mg by mouth every morning     LOSARTAN POTASSIUM PO     Sig: Take 100 mg by mouth every morning     ATENOLOL PO     Sig: Take 50 mg by mouth 2 times daily     WARFARIN SODIUM PO     Sig: Take 2 mg by mouth daily until 02/01; check INR 02/02     ipratropium - albuterol 0.5 mg/2.5 mg/3 mL (DUONEB) 0.5-2.5 (3) MG/3ML neb solution     Sig: Take 1 vial by nebulization 4 times daily for COPD for 5 days     Potassium (POTASSIMIN PO)     Sig: Take 20 mEq by mouth every morning         REVIEW OF SYSTEMS:  4 point ROS including Respiratory, CV, GI and , other than that noted in the HPI,  is negative    Physical Exam:  /71  Pulse 73  Temp 98.1  F (36.7  C)  Resp 18  Ht 5' 8\" (1.727 m)  Wt 194 lb (88 kg)  SpO2 94%  BMI 29.5 kg/m2    Resp: Effort WNL, LS decreased in bases  CV: VS  As above  Abd- soft, nontender, BS +  Musc- DUFF  Psych- alert, calm, pleasant       BP 01/30-02/01: 131-172/ mmHg    Recent Labs: no labs available    Assessment/Plan:  SOB (shortness of breath)  Influenza A - completed antiviral  Congestive heart failure, unspecified congestive heart failure chronicity, unspecified congestive heart failure type (H)  - continue on current meds and treatments  - follow clinically/VS/weights  - O2 noc and check sats with overnight cares, wean as able to keep sats > 90%          Electronically signed by  JERED Erickson CNP                  "

## 2018-02-02 NOTE — PROGRESS NOTES
[facility]    HPI:    Yolanda Keen is a 87 year old  (5/1/1930), who is being seen today for an episodic care visit at [facility].   HPI information obtained from: facility chart records. Today's concern is INR/Coumadin management for YAYO. Fib    Bleeding Signs/Symptoms:  None  Thromboembolic Signs/Symptoms:  None    Medication Changes:  No  Dietary Changes:  No  Activity Changes: No  Bacterial/Viral Infection:  No    Missed Coumadin Doses:  None    On ASA: No    Other Concerns:  No    OBJECTIVE:    INR Today:  1.9  Current Dose:  2 mg    ASSESSMENT:     Encounter for therapeutic drug monitoring  Long-term (current) use of anticoagulants  Atrial fibrillation, unspecified type (H)     Subtherapeutic INR for goal of 2-3    PLAN:    New Dose: coumadin 4 mg tonight then 2 mg daily      Next INR: 2/5    Encounter Diagnoses   Name Primary?     Encounter for therapeutic drug monitoring Yes     Long-term (current) use of anticoagulants      Atrial fibrillation, unspecified type (H)          Electronically signed by:  JERED Muñoz CNP

## 2018-02-05 NOTE — PROGRESS NOTES
[facility]    Chief Complaint   Patient presents with     RECHECK     INR RESULTS       HPI:    Yolanda Keen is a 87 year old  (5/1/1930), who is being seen today for an episodic care visit at [facility].  HPI information obtained from: facility chart records.Today's concern is:     Influenza A  Chronic congestive heart failure, unspecified congestive heart failure type (H)  Calcific aortic valve stenosis  Benign essential hypertension  Chronic atrial fibrillation (H)  Physical deconditioning  Cognitive impairment  Encounter for therapeutic drug monitoring  Long-term (current) use of anticoagulants     Nursing report occasional oxygen at night. Patient has no complains appears to be in good spirits.    ALLERGIES: Lisinopril and Morphine  Past Medical, Surgical, Family and Social History reviewed and updated in EPIC.    Current Outpatient Prescriptions   Medication Sig Dispense Refill     ACETAMINOPHEN PO Take 500 mg by mouth 3 times daily as needed for pain       albuterol (PROAIR HFA/PROVENTIL HFA/VENTOLIN HFA) 108 (90 BASE) MCG/ACT Inhaler Inhale 2 puffs into the lungs 4 times daily       Cholecalciferol 5000 UNITS TABS Take 1 capsule by mouth every morning       hydrocortisone (ANUSOL-HC) 2.5 % cream Place 1 applicator rectally 2 times daily as needed for hemorrhoids       albuterol (2.5 MG/3ML) 0.083% neb solution Take 1 vial by nebulization every 4 hours as needed for shortness of breath / dyspnea or wheezing       ONDANSETRON PO Take 4 mg by mouth every 8 hours as needed for nausea or vomiting       CITALOPRAM HYDROBROMIDE PO Take 20 mg by mouth every morning       MELATONIN PO Take 3 mg by mouth nightly as needed       BENZONATATE PO Take 100 mg by mouth every 4 hours as needed for cough        Dextromethorphan-guaiFENesin  MG/5ML syrup Take 10 mLs by mouth every 4 hours as needed for cough       FUROSEMIDE PO Take 40 mg by mouth every morning        "LOSARTAN POTASSIUM PO Take 100 mg by mouth every morning       ATENOLOL PO Take 50 mg by mouth 2 times daily       WARFARIN SODIUM PO Take 2 mg by mouth daily until 02/14; check INR 02/15       Potassium (POTASSIMIN PO) Take 20 mEq by mouth every morning       Medications reviewed:  Medications reconciled to facility chart and changes were made to reflect current medications as identified as above med list. Below are the changes that were made:   Medications stopped since last EPIC medication reconciliation:   Medications Discontinued During This Encounter   Medication Reason     ipratropium - albuterol 0.5 mg/2.5 mg/3 mL (DUONEB) 0.5-2.5 (3) MG/3ML neb solution Medication Reconciliation Clean Up       Medications started since last [facility] medication reconciliation:  Orders Placed This Encounter   Medications     ACETAMINOPHEN PO     Sig: Take 500 mg by mouth 3 times daily as needed for pain       REVIEW OF SYSTEMS:  4 point ROS including Respiratory, CV, GI and , other than that noted in the HPI,  is negative    Physical Exam:  /81  Pulse 83  Temp 97.7  F (36.5  C)  Resp 17  Ht 5' 8\" (1.727 m)  Wt 191 lb 6.4 oz (86.8 kg)  SpO2 97%  BMI 29.1 kg/m2  BP 01/31-02/05: / mmHg    GENERAL APPEARANCE:  Alert, in no distress, appears healthy, cooperative, sitting in w/c outside her room.  ENT:  Mouth and posterior oropharynx normal, moist mucous membranes, [place]  EYES:  EOM, conjunctivae, lids, pupils and irises normal, EOM normal, conjunctiva and lids normal, eye glasses present  RESP:  respiratory effort and palpation of chest normal, lungs clear except for Left base diminished, no respiratory distress  CV:  Palpation and auscultation of heart done , regular rate and rhythm, + murmur, rub, or gallop, no edema noted, TEDS donned  ABDOMEN:  normal bowel sounds, soft, nontender, no hepatosplenomegaly or other masses, no guarding or rebound  M/S:   Gait and station abnormal unsteady gait  Digits and " nails normal  SKIN:  Inspection of skin and subcutaneous tissue baseline, Palpation of skin and subcutaneous tissue baseline  NEURO:   Cranial nerves 2-12 are normal tested and grossly at patient's baseline  PSYCH:  oriented to x2, insight and judgement impaired, memory impaired , affect and mood normal     Recent Labs: no labs available    Assessment/Plan:  (Z87.09) History of influenza  (primary encounter diagnosis)  Comment: Improving. Inpatient required supplemental oxygen, Tx with Tamiflu and prednisone. Discharged to [facility], now transferred to  per family preference.  -today she is on RA. Required oxygen last night.  Plan: extend duonebs qid x 5 days  -encourage oral fluids  -no need for isolation at this time  -Incentive spirometer q 2 hrs while awake; needs assistance from nursing or family    CHF  (I35.0) Calcific aortic valve stenosis(R60.9)   Comment: stable. Last EF 55-60% 2/17.   -2/2 Had increasing need for oxygen. CXR 2/2: indicated CHF. Received increased furosemide 40 mg bid x 2 days with good effect.  -2/5 On RA appears to be euvolemic.  Plan: daily weights  -TEDs on AM off PM  -cont furosemide 40 mg daily  -recheck bmp/cbc   -cont KCL 20 meq daily     HTN  Controlled. BP Ranging 113//92  Cont atenolol, losartan, and furosemide  -VS per unit protocol     (I48.2) Chronic atrial fibrillation (H)  Comment: rate controlled.  1/31 INR 2.5 dose 2 mg   2/5          2.0          2 mg   Plan: cont coumadin 2 mg daily  -recheck INR 2/8     Cognitive impairment  Needs assistance with cares. At home daughter has been helping her.  -2/5 Patient is using call light appropriately. In a happy mood.  -recommended f/u with neurology for evaluation after discharge from TCU  -nursing supportive care  -monitor hydration  -falls risk  -toilet q 2 hours     (R53.81) Physical deconditioning  Comment: s/s influenza and recent hospitalization. Lives with family  Plan: PT/OT  -SW for discharge planning and care  conference     Orders:  -continue coumadin 2 mg daily  Recheck INR 2/8    Total time spent with patient visit at the skilled nursing facility was 25 min including patient visit and review of past records. Greater than 50% of total time spent with counseling and coordinating care due to chf, hydration    Electronically signed by  JERED Muñoz CNP

## 2018-02-07 ENCOUNTER — AMBULATORY - HEALTHEAST (OUTPATIENT)
Dept: GERIATRICS | Facility: CLINIC | Age: 83
End: 2018-02-07

## 2018-02-07 ENCOUNTER — COMMUNICATION - HEALTHEAST (OUTPATIENT)
Dept: NURSING | Facility: CLINIC | Age: 83
End: 2018-02-07

## 2018-02-07 DIAGNOSIS — I48.20 CHRONIC ATRIAL FIBRILLATION (H): ICD-10-CM

## 2018-02-08 NOTE — PROGRESS NOTES
PRIMARY CARE PROVIDER AND CLINIC RESPONSIBLE:  System, Provider Not In,          ADMISSION HISTORY AND PHYSICAL EXAMINATION     Chief Complaint   Patient presents with     Hospital F/U         HISTORY OF PRESENT ILLNESS:  87 year old female, (5/1/1930), admitted to the [facility] TCU for continuation of medical care and rehab.    Pt admitted at [facility] 1/18 to 1/23 for influenza A, treated with tamiflu and steroids then [facility] to 1/30.    Pt denies any fevers/chills/chest pain/SOB/N/V. Her cough has subsided.    Please see Mayte Mackenzie's CNP admit noted dated 1/31 for details of admission, past medical history, family history, allergies, medication list, social history and other details pertinent with this admission. Hospital admission and dc summary reviewed.      No past medical history on file.    No past surgical history on file.    Current Outpatient Prescriptions   Medication Sig     ipratropium - albuterol 0.5 mg/2.5 mg/3 mL (DUONEB) 0.5-2.5 (3) MG/3ML neb solution Take 1 vial by nebulization 4 times daily     ACETAMINOPHEN PO Take 500 mg by mouth 3 times daily as needed for pain     albuterol (PROAIR HFA/PROVENTIL HFA/VENTOLIN HFA) 108 (90 BASE) MCG/ACT Inhaler Inhale 2 puffs into the lungs 4 times daily     Cholecalciferol 5000 UNITS TABS Take 1 capsule by mouth every morning     hydrocortisone (ANUSOL-HC) 2.5 % cream Place 1 applicator rectally 2 times daily as needed for hemorrhoids     albuterol (2.5 MG/3ML) 0.083% neb solution Take 1 vial by nebulization every 4 hours as needed for shortness of breath / dyspnea or wheezing     ONDANSETRON PO Take 4 mg by mouth every 8 hours as needed for nausea or vomiting     CITALOPRAM HYDROBROMIDE PO Take 20 mg by mouth every morning     MELATONIN PO Take 3 mg by mouth nightly as needed     BENZONATATE PO Take 100 mg by mouth every 4 hours as needed for cough      Dextromethorphan-guaiFENesin  MG/5ML syrup Take 10 mLs by mouth every 4  "hours as needed for cough     FUROSEMIDE PO Take 40 mg by mouth every morning     LOSARTAN POTASSIUM PO Take 100 mg by mouth every morning     ATENOLOL PO Take 50 mg by mouth 2 times daily     Potassium (POTASSIMIN PO) Take 20 mEq by mouth every morning     WARFARIN SODIUM PO Take 2 mg by mouth daily until 02/07; check INR 02/08     No current facility-administered medications for this visit.        Allergies   Allergen Reactions     Lisinopril      Morphine        Social History     Social History     Marital status:      Spouse name: N/A     Number of children: N/A     Years of education: N/A     Occupational History     Not on file.     Social History Main Topics     Smoking status: Not on file     Smokeless tobacco: Not on file     Alcohol use Not on file     Drug use: Not on file     Sexual activity: Not on file     Other Topics Concern     Not on file     Social History Narrative          Information reviewed:  Medications, vital signs, orders, nursing notes, problem list, hospital information.     ROS: All 10 point review of system completed, those pertinent positive, please see H&P, the remaining ROS is negative.    /74  Pulse 62  Temp 97.2  F (36.2  C)  Resp 16  Ht 5' 8\" (1.727 m)  Wt 190 lb 12.8 oz (86.5 kg)  SpO2 (!) 88%  BMI 29.01 kg/m2    PHYSICAL EXAMINATION:   GENERAL:  No acute distress. Sitting up in bed.  SKIN:  Dry and warm.  There is no rash, lesions, ulcers or juandice at area of skin examined.  HEENT:  Head without trauma.  Pupils round, reactive. Exam of conjunctiva and lids are normal. Sclera without icterus. There is no oral thrush.  NECK:  Supple.  There is no cervical adenopathy, no thyromegaly. No jugular venous distension.  CHEST: No reproducible chest tenderness.   LUNGS:  Normal respiratory effort. Lungs are Clear on ascultation.  HEART:  Irregularly irregular.  3/6 systolic murmur, no gallops or rubs auscultated.  ABDOMEN:  Soft, bowel sounds positive.  There is no " tenderness or guarding.   EXTREMITIES: Trace edema.  No calf swelling or tenderness.  NEUROLOGIC:  Alert and oriented to self and situation.    Lab/Diagnostic data:  None available.    ASSESSMENT / PLAN:     Influenza A  - s/p tamiflu and prednisone.  - On nebs, likely can make prn only now.    Chronic diastolic congestive heart failure (H)  - Her prior TTE showed nml EF with AS.  - On losartan, lasix and atenolol.  - daily weights.  - f/u with PCP for AS.    Benign essential hypertension  - On atenolol and losartan.    Depression, unspecified depression type  - on citalopram.    Chronic atrial fibrillation (H)  - On atenolol for rate control.  - On coumadin for CVA ppx.  - INR goal 2-3.    Physical deconditioning  -Plan: PT/OT, fall precautions. Care conference with patient and family for the progress of rehab and disposition issues will be discussed as planned. Rehab evaluation and other evaluations including CPT are at rehab logs, to be reviewed separately.  Fall risk assessment as well as cognitive evaluation will be formed during rehab stay if indicated.      Other problems with same care. Primary care doctor and other specialists to address those chronic problems in next clinic appointment to be scheduled upon discharge from the TCU.    Total time spent with patient visit was 31 min including patient visit, review of past records, 1/2 time on patients counseling and coordinating care.        Sera Yeboah MD

## 2018-02-08 NOTE — MR AVS SNAPSHOT
"              After Visit Summary   2/8/2018    Yolanda Keen    MRN: [id number]           Patient Information     Date Of Birth          5/1/1930        Visit Information        Provider Department      2/8/2018 9:30 AM Sera Yeboah MD [facility]        Today's Diagnoses     Influenza A    -  1    Chronic diastolic congestive heart failure (H)        Benign essential hypertension        Depression, unspecified depression type        Chronic atrial fibrillation (H)        Physical deconditioning           Follow-ups after your visit        Who to contact     If you have questions or need follow up information about today's clinic visit or your schedule please contact [facility] directly at [phone].  Normal or non-critical lab and imaging results will be communicated to you by Savisionhart, letter or phone within 4 business days after the clinic has received the results. If you do not hear from us within 7 days, please contact the clinic through Savisionhart or phone. If you have a critical or abnormal lab result, we will notify you by phone as soon as possible.  Submit refill requests through [email or web address] or call your pharmacy and they will forward the refill request to us. Please allow 3 business days for your refill to be completed.          Additional Information About Your Visit        MyChart Information     [email or web address] lets you send messages to your doctor, view your test results, renew your prescriptions, schedule appointments and more. To sign up, go to [email or web address] . Click on \"Log in\" on the left side of the screen, which will take you to the Welcome page. Then click on \"Sign up Now\" on the right side of the page.     You will be asked to enter the access code listed below, as well as some personal information. Please follow the directions to create your username and password.     Your access code is: [email or web address]  Expires: 5/10/2018  1:23 AM     Your access code " "will  in 90 days. If you need help or a new code, please call your [facility] clinic or [phone].        Care EveryWhere ID     This is your Care EveryWhere ID. This could be used by other organizations to access your [facility] medical records  [id number]        Your Vitals Were     Pulse Temperature Respirations Height Pulse Oximetry BMI (Body Mass Index)    62 97.2  F (36.2  C) 16 5' 8\" (1.727 m) 88% 29.01 kg/m2       Blood Pressure from Last 3 Encounters:   18 111/74   18 138/81   18 (!) 142/91    Weight from Last 3 Encounters:   18 190 lb 12.8 oz (86.5 kg)   18 191 lb 6.4 oz (86.8 kg)   18 196 lb (88.9 kg)              Today, you had the following     No orders found for display       Primary Care Provider Fax #    Provider Not In System [phone]                Equal Access to Services     [facility] : Hadii aad ku hadasho Soomaali, waaxda luqadaha, qaybta kaalmada adeegyada, waxay sonia maddox . So [facility] [phone].    ATENCIÓN: Si habla español, tiene a zeng disposición servicios gratuitos de asistencia lingüística. Llame al [phone].    We comply with applicable federal civil rights laws and [place] laws. We do not discriminate on the basis of race, color, national origin, age, disability, sex, sexual orientation, or gender identity.            Thank you!     Thank you for choosing [facility]  for your care. Our goal is always to provide you with excellent care. Hearing back from our patients is one way we can continue to improve our services. Please take a few minutes to complete the written survey that you may receive in the mail after your visit with us. Thank you!             Your Updated Medication List - Protect others around you: Learn how to safely use, store and throw away your medicines at www.disposemymeds.org.          This list is accurate as of 18 11:59 PM.  Always use your most recent med list. "                   Brand Name Dispense Instructions for use Diagnosis    ACETAMINOPHEN PO      Take 500 mg by mouth 3 times daily as needed for pain        * albuterol 108 (90 BASE) MCG/ACT Inhaler    PROAIR HFA/PROVENTIL HFA/VENTOLIN HFA     Inhale 2 puffs into the lungs 4 times daily        * albuterol (2.5 MG/3ML) 0.083% neb solution      Take 1 vial by nebulization every 4 hours as needed for shortness of breath / dyspnea or wheezing        ATENOLOL PO      Take 50 mg by mouth 2 times daily        BENZONATATE PO      Take 100 mg by mouth every 4 hours as needed for cough        Cholecalciferol 5000 UNITS Tabs      Take 1 capsule by mouth every morning        CITALOPRAM HYDROBROMIDE PO      Take 20 mg by mouth every morning        Dextromethorphan-guaiFENesin  MG/5ML syrup      Take 10 mLs by mouth every 4 hours as needed for cough        FUROSEMIDE PO      Take 40 mg by mouth every morning        hydrocortisone 2.5 % cream    ANUSOL-HC     Place 1 applicator rectally 2 times daily as needed for hemorrhoids        ipratropium - albuterol 0.5 mg/2.5 mg/3 mL 0.5-2.5 (3) MG/3ML neb solution    DUONEB     Take 1 vial by nebulization 4 times daily        LOSARTAN POTASSIUM PO      Take 100 mg by mouth every morning        MELATONIN PO      Take 3 mg by mouth nightly as needed        ONDANSETRON PO      Take 4 mg by mouth every 8 hours as needed for nausea or vomiting        POTASSIMIN PO      Take 20 mEq by mouth every morning        WARFARIN SODIUM PO      Take 2 mg by mouth daily until 02/07; check INR 02/08        * Notice:  This list has 2 medication(s) that are the same as other medications prescribed for you. Read the directions carefully, and ask your doctor or other care provider to review them with you.

## 2018-02-12 PROBLEM — I10 BENIGN ESSENTIAL HYPERTENSION: Status: ACTIVE | Noted: 2018-01-01

## 2018-02-12 PROBLEM — Z87.09 HISTORY OF INFLUENZA: Status: ACTIVE | Noted: 2018-01-01

## 2018-02-12 PROBLEM — I50.9 CHRONIC CONGESTIVE HEART FAILURE, UNSPECIFIED CONGESTIVE HEART FAILURE TYPE: Status: ACTIVE | Noted: 2018-01-01

## 2018-02-12 PROBLEM — I35.0: Status: ACTIVE | Noted: 2018-01-01

## 2018-02-12 PROBLEM — R41.89 COGNITIVE IMPAIRMENT: Status: ACTIVE | Noted: 2018-01-01

## 2018-02-12 NOTE — PROGRESS NOTES
[facility]    HPI:    Yolanda Keen is a 87 year old  (5/1/1930), who is being seen today for an episodic care visit at [facility].   HPI information obtained from: facility chart records. Today's concern is INR/Coumadin management for YAYO. Fib    Bleeding Signs/Symptoms:  None  Thromboembolic Signs/Symptoms:  None    Medication Changes:  No  Dietary Changes:  No  Activity Changes: No  Bacterial/Viral Infection:  No    Missed Coumadin Doses:  None    On ASA: No    Other Concerns:  No    OBJECTIVE:    INR Today:  1.9  Current Dose: 2 mg    ASSESSMENT:     Encounter for therapeutic drug monitoring  Long-term (current) use of anticoagulants  Atrial fibrillation, unspecified type (H)     Subtherapeutic INR for goal of 2-3    PLAN:    New Dose: coumadin 4 mg tonight then 2 mg daily      Next INR: 2/15    Encounter Diagnoses   Name Primary?     Encounter for therapeutic drug monitoring Yes     Long-term (current) use of anticoagulants      Atrial fibrillation, unspecified type (H)          Electronically signed by:  JERED Muñoz CNP

## 2018-02-14 ENCOUNTER — COMMUNICATION - HEALTHEAST (OUTPATIENT)
Dept: NURSING | Facility: CLINIC | Age: 83
End: 2018-02-14

## 2018-02-14 DIAGNOSIS — I48.20 CHRONIC ATRIAL FIBRILLATION (H): ICD-10-CM

## 2018-02-19 NOTE — PROGRESS NOTES
[place] [facility] DISCHARGE SUMMARY    PATIENT'S NAME: Yolanda Keen  YOB: 1930  MEDICAL RECORD NUMBER:  [id number]    PRIMARY CARE PROVIDER AND CLINIC RESPONSIBLE AFTER TRANSFER: System, Provider Not In       CODE STATUS/ADVANCE DIRECTIVES DISCUSSION:   DNR only       Allergies   Allergen Reactions     Lisinopril      Morphine        TRANSFERRING PROVIDERS: JERED Muñoz CNP, Sera Yeboah MD  DATE OF SNF ADMISSION:  January / 30 / 2018  DATE OF SNF (anticipated) DISCHARGE: February / 20 / 2018  DISCHARGE DISPOSITION: Assisted Living: [facility] Facility: [facility] stay 01/23/2018 through 01/30/3018. And [facility] stay 01/18/2018 through 01/23/2018.    Condition on Discharge:  Improving.  Function: Groom/Hyg: VC SBA, UB Dressing: individually, LB Dressing: min A compressions, Toileting: SBA, Bed Mob: SBA, Transfers: CGA-SBA, Ambulates: ' with 4 wheel walker  Cognitive Scores: SLUMS 9/30 and CPT 3.6/5.6    Equipment: walker    DISCHARGE DIAGNOSIS:   1. Influenza A    2. Calcific aortic valve stenosis    3. Benign essential hypertension    4. Chronic congestive heart failure, unspecified congestive heart failure type (H)    5. Cognitive impairment    6. Physical deconditioning        HPI Nursing Facility Course:  HPI information obtained from: facility chart records.      (Z87.09) History of influenza  (primary encounter diagnosis)  Comment: resolved. Inpatient required supplemental oxygen, Tx with Tamiflu and prednisone. Discharged to [facility], now transferred to  per family preference.  -weaned off oxygen. No cough noted  Plan:   -encourage oral fluids  -Incentive spirometer q 2 hrs while awake; needs assistance from nursing or family     CHF  (I35.0) Calcific aortic valve stenosis(R60.9)   Comment: improving. Last EF 55-60% 2/17.   -2/2 Had increasing need for oxygen. CXR 2/2: indicated CHF.  "Received increased furosemide 40 mg bid x 2 days with good effect.  -2/5 On RA appears to be euvolemic.  Plan: daily weights  -TEDs on AM off PM  -cont furosemide 40 mg daily, occasional titration needed  -recheck bmp/cbc   -cont KCL 20 meq daily      HTN  Controlled. BP Ranging 113//92  Cont atenolol, losartan, and furosemide  -VS per unit protocol      (I48.2) Chronic atrial fibrillation (H)  Comment: rate controlled.  1/31 INR 2.5 dose 2 mg   2/5          2.0          2 mg   2/8          2.2          2 mg  2/12        1.9          4 mg  2/13         \"            2 mg  2/15        2.1          2  Mg  2/19        1.9          4 mg    -recheck INR 2/22     Cognitive impairment  Needs assistance with cares. At home daughter has been helping her.  -2/5 Patient is using call light appropriately. In a happy mood.  -recommended f/u with neurology for evaluation after discharge from TCU  -nursing supportive care  -monitor hydration  -falls risk  -toilet q 2 hours      (R53.81) Physical deconditioning  Comment: s/s influenza and recent hospitalization. [facility] to [facility]  Plan: PT/OT        PAST MEDICAL HISTORY:  has no past medical history on file.    DISCHARGE MEDICATIONS:  Current Outpatient Prescriptions   Medication Sig Dispense Refill     ACETAMINOPHEN PO Take 500 mg by mouth 3 times daily as needed for pain       albuterol (PROAIR HFA/PROVENTIL HFA/VENTOLIN HFA) 108 (90 BASE) MCG/ACT Inhaler Inhale 2 puffs into the lungs 4 times daily as needed        Cholecalciferol 5000 UNITS TABS Take 1 capsule by mouth every morning       hydrocortisone (ANUSOL-HC) 2.5 % cream Place 1 applicator rectally 2 times daily as needed for hemorrhoids       albuterol (2.5 MG/3ML) 0.083% neb solution Take 1 vial by nebulization every 4 hours as needed for shortness of breath / dyspnea or wheezing       ONDANSETRON PO Take 4 mg by mouth every 8 hours as needed for nausea or vomiting       CITALOPRAM HYDROBROMIDE PO Take 20 mg by " "mouth every morning       MELATONIN PO Take 3 mg by mouth nightly as needed       BENZONATATE PO Take 100 mg by mouth every 4 hours as needed for cough        Dextromethorphan-guaiFENesin  MG/5ML syrup Take 10 mLs by mouth every 4 hours as needed for cough       FUROSEMIDE PO Take 40 mg by mouth every morning       LOSARTAN POTASSIUM PO Take 100 mg by mouth every morning       ATENOLOL PO Take 50 mg by mouth 2 times daily       WARFARIN SODIUM PO Take 4 mg by mouth daily until 02/21; check INR 02/22       Potassium (POTASSIMIN PO) Take 20 mEq by mouth every morning         MEDICATION CHANGES/RATIONALE:   none  Controlled medications sent with patient:   not applicable/none     ROS:    4 point ROS including Respiratory, CV, GI and , other than that noted in the HPI,  is negative    Physical Exam:   Vitals: /77  Pulse 69  Temp 98.1  F (36.7  C)  Resp 16  Ht 5' 8\" (1.727 m)  Wt 193 lb 6.4 oz (87.7 kg)  SpO2 94%  BMI 29.41 kg/m2  BMI= Body mass index is 29.41 kg/(m^2).     GENERAL APPEARANCE:  Alert, in no distress, appears healthy, cooperative, sitting in w/c with lots of family at bedside  ENT:  Mouth and posterior oropharynx normal, moist mucous membranes, [place]  EYES:  EOM, conjunctivae, lids, pupils and irises normal, EOM normal, conjunctiva and lids normal, eye glasses present  RESP:  respiratory effort and palpation of chest normal, lungs clear, no respiratory distress  CV:  Palpation and auscultation of heart done , regular rate and rhythm, + murmur, rub, or gallop, no edema noted, TEDS donned  ABDOMEN:  normal bowel sounds, soft, nontender, no hepatosplenomegaly or other masses, no guarding or rebound  M/S:   Gait and station abnormal unsteady gait  Digits and nails normal  SKIN:  Inspection of skin and subcutaneous tissue baseline, Palpation of skin and subcutaneous tissue baseline  NEURO:   Cranial nerves 2-12 are normal tested and grossly at patient's baseline  PSYCH:  oriented to x2, " insight and judgement impaired, memory impaired , affect and mood normal      DISCHARGE PLAN:  Occupational Therapy, Physical Therapy, Registered Nurse, Home Health Aide and From:  [facility]  Patient instructed to follow-up with:  PCP in 7 days      Current [facility] scheduled appointments:  Future Appointments: no future appointments scheduled  MTM referral needed and placed by this provider: No    Pending labs: none  SNF labs   Last Basic Metabolic Panel:  Recent Labs   Lab Test  02/13/18   0630   NA  140   POTASSIUM  4.1   CHLORIDE  105   ERIN  9.1   CO2  32   BUN  22   CR  0.81   GLC  80       Discharge Treatments:TEDs stocking on AM off PM    TOTAL DISCHARGE TIME:   Greater than 30 minutes  Electronically signed by:  JERED Muñoz CNP

## 2018-02-21 ENCOUNTER — COMMUNICATION - HEALTHEAST (OUTPATIENT)
Dept: FAMILY MEDICINE | Facility: CLINIC | Age: 83
End: 2018-02-21

## 2018-02-22 NOTE — PROGRESS NOTES
[place] [facility]  PRIMARY CARE PROVIDER AND CLINIC:  SukumarLupillo kraus Pauly [address] / [place] [address]  Chief Complaint   Patient presents with     New Patient       HPI:    Yolanda Keen is a 87 year old  (5/1/1930),[facility] 02[facility]/2018. And [facility] stay 01/23/2018 through 01/30/3018. And [facility] stay 01/18/2018 through 01/23/2018.  Admitted to this facility for  rehab, medical management and nursing care.  HPI information obtained from: facility chart records, facility staff, patient report, [facility] chart review and Care Everywhere [facility] chart review. Prior to hospitalization she was living alone in the community. She is , has several children and grandchildren in the community. Her daughter- in- law Radha helps with her cares. Radha has another family member living at [facility]. Current issues are:        Chronic congestive heart failure, unspecified congestive heart failure type (H)  Calcific aortic valve stenosis  Pacemaker  Last echo on 2/16/17 shows EF at 55-60% with moderate aortic stenosis. She requires the head of the bed to be elevated more than 30 degrees most of the time due to congestive heart failure and difficulty with breathing when the head of the bed is not elevated. Additionally she requires a bed height difference with a fully electric bed versus a fixed height hospital bed to permit safety with transfers from sit to stand position from the bed. Because of her CHF in conjunction with COPD she also requires frequent body position changes every 1-2 hours to prevent difficulties with breathing. She does require staff assist at times to accomplish this goal related to lack of strength and forgetfulness to the problem and solution.    Chronic a-fib (H)  Benign essential hypertension  Lower extremity edema  Hx of with tachy-sanket syndrome. Anticoagulation with  coumadin. BP averages in /-92. In the AL BP averages higher at 145/86. Weight on 2/20 stable at 193 lbs. Has some lower extremity edema baseline.     Adjustment disorder with depressed mood  Pleasant and calm at today's visit.    Cognitive impairment  SLUMS 9/30 and CPT 3.6/5.6. New to memory care from the community.    Vitamin D deficiency  On supplement.    Insomnia, unspecified type  No concerns from staff.    CODE STATUS/ADVANCE DIRECTIVES DISCUSSION:   DNR only  Patient's living condition: lives in an assisted living facility-memory care    ALLERGIES:Lisinopril and Morphine  PAST MEDICAL HISTORY:  has a past medical history of Cardiac pacemaker in situ and Tachy-sanket syndrome (H).  PAST SURGICAL HISTORY:  has a past surgical history that includes TOTAL KNEE ARTHROPLASTY (Right, 2003); Hysterectomy (1980); Nephrectomy (Left, 1982); and Cholecystectomy (1980).  FAMILY HISTORY: family history is not on file.  SOCIAL HISTORY:  reports that she has never smoked. She does not have any smokeless tobacco history on file.    Post Discharge Medication Reconciliation Status: discharge medications reconciled, continue medications without change.  Current Outpatient Prescriptions   Medication Sig Dispense Refill     ACETAMINOPHEN PO Take 500 mg by mouth 3 times daily as needed for pain       albuterol (PROAIR HFA/PROVENTIL HFA/VENTOLIN HFA) 108 (90 BASE) MCG/ACT Inhaler Inhale 2 puffs into the lungs 4 times daily as needed        Cholecalciferol 5000 UNITS TABS Take 1 capsule by mouth every morning       hydrocortisone (ANUSOL-HC) 2.5 % cream Place 1 applicator rectally 2 times daily as needed for hemorrhoids       ONDANSETRON PO Take 4 mg by mouth every 8 hours as needed for nausea or vomiting       CITALOPRAM HYDROBROMIDE PO Take 20 mg by mouth every morning       MELATONIN PO Take 3 mg by mouth nightly as needed       Dextromethorphan-guaiFENesin  MG/5ML syrup Take 10 mLs by mouth every 4 hours as  needed for cough       FUROSEMIDE PO Take 40 mg by mouth every morning       LOSARTAN POTASSIUM PO Take 100 mg by mouth every morning       ATENOLOL PO Take 50 mg by mouth 2 times daily       WARFARIN SODIUM PO Take 4 mg by mouth daily until 02/21; check INR 02/22       Potassium (POTASSIMIN PO) Take 20 mEq by mouth every morning         ROS:  Limited secondary to cognitive impairment but today pt reports fine    Exam:  /70  Pulse 66  Temp 97.4  F (36.3  C)  Resp 16  SpO2 91%  GENERAL APPEARANCE:  Alert, in no distress  ENT:  Mouth and posterior oropharynx normal, moist mucous membranes, [place], dentures upper  EYES:  EOM, conjunctivae, lids, pupils and irises normal, wears glasses  NECK:  No adenopathy,masses or thyromegaly  RESP:  respiratory effort and palpation of chest normal, lungs clear to auscultation   CV:  Palpation and auscultation of heart done , regular rate and rhythm, no murmur, rub, or gallop, trace edema in legs, ankles and feet. Nate Hose  ABDOMEN:  normal bowel sounds, soft, nontender, no hepatosplenomegaly or other masses  M/S:   Up with SBA  SKIN:  Inspection of skin and subcutaneous tissue baseline  NEURO:   Examination of sensation by touch normal  PSYCH:  insight and judgement impaired    Lab/Diagnostic data:    Last Basic Metabolic Panel:  Recent Labs   Lab Test  02/13/18   0630   NA  140   POTASSIUM  4.1   CHLORIDE  105   ERIN  9.1   CO2  32   BUN  22   CR  0.81   GLC  80     1/21/18     TSH:  0.85      1/18/18  WBC  4.7     RBC  4.41     Hgb 11.6      HCT 37.4     MCV 85      ASSESSMENT/PLAN:  (I50.9) Chronic congestive heart failure, unspecified congestive heart failure type (H)  (primary encounter diagnosis)  (I35.0) Calcific aortic valve stenosis  (Z95.0) Cardiac pacemaker in situ  Comment: Chronic and Stable  Plan:   -Last pacer check was 12/7/17  -VS weekly, weights are monthly in AL    (I48.2) Chronic a-fib (H)  (I10) Benign essential hypertension  (R60.0) Lower extremity  edema  Comment: Chronic and stable  Plan:     -Losartan 100 mg po daily  -Lasix 40 mg po daily   -Potassium 20 mEq po daily  -Atenolol 50 mg po daily  -Nate Hose on am and off HS    (F43.21) Adjustment disorder with depressed mood  (R41.89) Cognitive impairment  Comment: Ongoing and stable  Plan:   -Citalopram 20 mg po qd  -Monitor mood and affect with adjustment to memory care unit    (E55.9) Vitamin D deficiency  Comment: Hx of  Plan:   -Cholecalciferol 5000 units po daily  -Lab level with next set of labs    (G47.00) Insomnia, unspecified type  Comment: Chronic and stable  Plan:  -melatonin 3 mg po at HS          Electronically signed by:  JERED Deng CNP

## 2018-02-22 NOTE — LETTER
2/22/2018        RE: Yolanda Keen  Care of Molly [facility]  [address]  [facility] [address]        [place] [facility]  PRIMARY CARE PROVIDER AND CLINIC:  Lupillo Brasher [address] [place] [address] / [facility] [address]  Chief Complaint   Patient presents with     New Patient       HPI:    Yolanda Keen is a 87 year old  (5/1/1930),[facility]. And [facility] stay 01/23/2018 through 01/30/3018. And [facility] stay 01/18/2018 through 01/23/2018.  Admitted to this facility for  rehab, medical management and nursing care.  HPI information obtained from: facility chart records, facility staff, patient report, [facility] chart review and Care Everywhere [facility] chart review. Prior to hospitalization she was living alone in the community. She is , has several children and grandchildren in the community. Her daughter- in- law Radha helps with her cares. Radha has another family member living at [facility]. Current issues are:        Chronic congestive heart failure, unspecified congestive heart failure type (H)  Calcific aortic valve stenosis  Pacemaker  Last echo on 2/16/17 shows EF at 55-60% with moderate aortic stenosis. She requires the head of the bed to be elevated more than 30 degrees most of the time due to congestive heart failure and difficulty with breathing when the head of the bed is not elevated. Additionally she requires a bed height difference with a fully electric bed versus a fixed height hospital bed to permit safety with transfers from sit to stand position from the bed. Because of her CHF in conjunction with COPD she also requires frequent body position changes every 1-2 hours to prevent difficulties with breathing. She does require staff assist at times to accomplish this goal related to lack of strength and forgetfulness to the problem and solution.    Chronic  a-fib (H)  Benign essential hypertension  Lower extremity edema  Hx of with tachy-sanket syndrome. Anticoagulation with coumadin. BP averages in /-92. In the AL BP averages higher at 145/86. Weight on 2/20 stable at 193 lbs. Has some lower extremity edema baseline.     Adjustment disorder with depressed mood  Pleasant and calm at today's visit.    Cognitive impairment  SLUMS 9/30 and CPT 3.6/5.6. New to memory care from the community.    Vitamin D deficiency  On supplement.    Insomnia, unspecified type  No concerns from staff.    CODE STATUS/ADVANCE DIRECTIVES DISCUSSION:   DNR only  Patient's living condition: lives in an assisted living facility-memory care    ALLERGIES:Lisinopril and Morphine  PAST MEDICAL HISTORY:  has a past medical history of Cardiac pacemaker in situ and Tachy-sanket syndrome (H).  PAST SURGICAL HISTORY:  has a past surgical history that includes TOTAL KNEE ARTHROPLASTY (Right, 2003); Hysterectomy (1980); Nephrectomy (Left, 1982); and Cholecystectomy (1980).  FAMILY HISTORY: family history is not on file.  SOCIAL HISTORY:  reports that she has never smoked. She does not have any smokeless tobacco history on file.    Post Discharge Medication Reconciliation Status: discharge medications reconciled, continue medications without change.  Current Outpatient Prescriptions   Medication Sig Dispense Refill     ACETAMINOPHEN PO Take 500 mg by mouth 3 times daily as needed for pain       albuterol (PROAIR HFA/PROVENTIL HFA/VENTOLIN HFA) 108 (90 BASE) MCG/ACT Inhaler Inhale 2 puffs into the lungs 4 times daily as needed        Cholecalciferol 5000 UNITS TABS Take 1 capsule by mouth every morning       hydrocortisone (ANUSOL-HC) 2.5 % cream Place 1 applicator rectally 2 times daily as needed for hemorrhoids       ONDANSETRON PO Take 4 mg by mouth every 8 hours as needed for nausea or vomiting       CITALOPRAM HYDROBROMIDE PO Take 20 mg by mouth every morning       MELATONIN PO Take 3 mg by  mouth nightly as needed       Dextromethorphan-guaiFENesin  MG/5ML syrup Take 10 mLs by mouth every 4 hours as needed for cough       FUROSEMIDE PO Take 40 mg by mouth every morning       LOSARTAN POTASSIUM PO Take 100 mg by mouth every morning       ATENOLOL PO Take 50 mg by mouth 2 times daily       WARFARIN SODIUM PO Take 4 mg by mouth daily until 02/21; check INR 02/22       Potassium (POTASSIMIN PO) Take 20 mEq by mouth every morning         ROS:  Limited secondary to cognitive impairment but today pt reports fine    Exam:  /70  Pulse 66  Temp 97.4  F (36.3  C)  Resp 16  SpO2 91%  GENERAL APPEARANCE:  Alert, in no distress  ENT:  Mouth and posterior oropharynx normal, moist mucous membranes, [place], dentures upper  EYES:  EOM, conjunctivae, lids, pupils and irises normal, wears glasses  NECK:  No adenopathy,masses or thyromegaly  RESP:  respiratory effort and palpation of chest normal, lungs clear to auscultation   CV:  Palpation and auscultation of heart done , regular rate and rhythm, no murmur, rub, or gallop, trace edema in legs, ankles and feet. Nate Hose  ABDOMEN:  normal bowel sounds, soft, nontender, no hepatosplenomegaly or other masses  M/S:   Up with SBA  SKIN:  Inspection of skin and subcutaneous tissue baseline  NEURO:   Examination of sensation by touch normal  PSYCH:  insight and judgement impaired    Lab/Diagnostic data:    Last Basic Metabolic Panel:  Recent Labs   Lab Test  02/13/18   0630   NA  140   POTASSIUM  4.1   CHLORIDE  105   ERIN  9.1   CO2  32   BUN  22   CR  0.81   GLC  80     1/21/18     TSH:  0.85      1/18/18  WBC  4.7     RBC  4.41     Hgb 11.6      HCT 37.4     MCV 85      ASSESSMENT/PLAN:  (I50.9) Chronic congestive heart failure, unspecified congestive heart failure type (H)  (primary encounter diagnosis)  (I35.0) Calcific aortic valve stenosis  (Z95.0) Cardiac pacemaker in situ  Comment: Chronic and Stable  Plan:   -Last pacer check was 12/7/17  -VS weekly,  weights are monthly in AL    (I48.2) Chronic a-fib (H)  (I10) Benign essential hypertension  (R60.0) Lower extremity edema  Comment: Chronic and stable  Plan:     -Losartan 100 mg po daily  -Lasix 40 mg po daily   -Potassium 20 mEq po daily  -Atenolol 50 mg po daily  -Nate Hose on am and off HS    (F43.21) Adjustment disorder with depressed mood  (R41.89) Cognitive impairment  Comment: Ongoing and stable  Plan:   -Citalopram 20 mg po qd  -Monitor mood and affect with adjustment to memory care unit    (E55.9) Vitamin D deficiency  Comment: Hx of  Plan:   -Cholecalciferol 5000 units po daily  -Lab level with next set of labs    (G47.00) Insomnia, unspecified type  Comment: Chronic and stable  Plan:  -melatonin 3 mg po at HS          Electronically signed by:  JERED Deng CNP                    Sincerely,        JERED Deng CNP

## 2018-02-22 NOTE — LETTER
2/22/2018        RE: Yolanda Keen  Care of Molly Anderson  [address]  [facility] [address]        [place] [facility]  PRIMARY CARE PROVIDER AND CLINIC:  Lupillo Brasher [address] [place] [address] / [facility] [address]  Chief Complaint   Patient presents with     New Patient       HPI:    Yolanda Keen is a 87 year old  (5/1/1930),admitted to the [facility] from [facility] stay 01/30/2018 through 02/20/2018. And [facility] stay 01/23/2018 through 01/30/3018. And [facility] stay 01/18/2018 through 01/23/2018.  Admitted to this facility for  rehab, medical management and nursing care.  HPI information obtained from: facility chart records, facility staff, patient report, [facility] chart review and Care Everywhere [facility] chart review. Prior to hospitalization she was living alone in the community. She is , has several children and grandchildren in the community. Her daughter- in- law Radha helps with her cares. Radha has another family member living at [facility]. Current issues are:        Chronic congestive heart failure, unspecified congestive heart failure type (H)  Calcific aortic valve stenosis  Pacemaker  Last echo on 2/16/17 shows EF at 55-60% with moderate aortic stenosis.    Chronic a-fib (H)  Benign essential hypertension  Lower extremity edema  Hx of with tachy-sanket syndrome. Anticoagulation with coumadin. BP averages in /-92. In the AL BP averages higher at 145/86. Weight on 2/20 stable at 193 lbs. Has some lower extremity edema baseline.     Adjustment disorder with depressed mood  Pleasant and calm at today's visit.    Cognitive impairment  SLUMS 9/30 and CPT 3.6/5.6. New to memory care from the community.    Vitamin D deficiency  On supplement.    Insomnia, unspecified type  No concerns from staff.    CODE STATUS/ADVANCE DIRECTIVES DISCUSSION:   DNR only  Patient's living condition: lives in an assisted living  facility-memory care    ALLERGIES:Lisinopril and Morphine  PAST MEDICAL HISTORY:  has a past medical history of Cardiac pacemaker in situ and Tachy-sanket syndrome (H).  PAST SURGICAL HISTORY:  has a past surgical history that includes TOTAL KNEE ARTHROPLASTY (Right, 2003); Hysterectomy (1980); Nephrectomy (Left, 1982); and Cholecystectomy (1980).  FAMILY HISTORY: family history is not on file.  SOCIAL HISTORY:  reports that she has never smoked. She does not have any smokeless tobacco history on file.    Post Discharge Medication Reconciliation Status: discharge medications reconciled, continue medications without change.  Current Outpatient Prescriptions   Medication Sig Dispense Refill     ACETAMINOPHEN PO Take 500 mg by mouth 3 times daily as needed for pain       albuterol (PROAIR HFA/PROVENTIL HFA/VENTOLIN HFA) 108 (90 BASE) MCG/ACT Inhaler Inhale 2 puffs into the lungs 4 times daily as needed        Cholecalciferol 5000 UNITS TABS Take 1 capsule by mouth every morning       hydrocortisone (ANUSOL-HC) 2.5 % cream Place 1 applicator rectally 2 times daily as needed for hemorrhoids       ONDANSETRON PO Take 4 mg by mouth every 8 hours as needed for nausea or vomiting       CITALOPRAM HYDROBROMIDE PO Take 20 mg by mouth every morning       MELATONIN PO Take 3 mg by mouth nightly as needed       Dextromethorphan-guaiFENesin  MG/5ML syrup Take 10 mLs by mouth every 4 hours as needed for cough       FUROSEMIDE PO Take 40 mg by mouth every morning       LOSARTAN POTASSIUM PO Take 100 mg by mouth every morning       ATENOLOL PO Take 50 mg by mouth 2 times daily       WARFARIN SODIUM PO Take 4 mg by mouth daily until 02/21; check INR 02/22       Potassium (POTASSIMIN PO) Take 20 mEq by mouth every morning         ROS:  Limited secondary to cognitive impairment but today pt reports fine    Exam:  /70  Pulse 66  Temp 97.4  F (36.3  C)  Resp 16  SpO2 91%  GENERAL APPEARANCE:  Alert, in no distress  ENT:   Mouth and posterior oropharynx normal, moist mucous membranes, [place], dentures upper  EYES:  EOM, conjunctivae, lids, pupils and irises normal, wears glasses  NECK:  No adenopathy,masses or thyromegaly  RESP:  respiratory effort and palpation of chest normal, lungs clear to auscultation   CV:  Palpation and auscultation of heart done , regular rate and rhythm, no murmur, rub, or gallop, trace edema in legs, ankles and feet. Nate Hose  ABDOMEN:  normal bowel sounds, soft, nontender, no hepatosplenomegaly or other masses  M/S:   Up with SBA  SKIN:  Inspection of skin and subcutaneous tissue baseline  NEURO:   Examination of sensation by touch normal  PSYCH:  insight and judgement impaired    Lab/Diagnostic data:    Last Basic Metabolic Panel:  Recent Labs   Lab Test  02/13/18   0630   NA  140   POTASSIUM  4.1   CHLORIDE  105   ERIN  9.1   CO2  32   BUN  22   CR  0.81   GLC  80     1/21/18     TSH:  0.85      1/18/18  WBC  4.7     RBC  4.41     Hgb 11.6      HCT 37.4     MCV 85      ASSESSMENT/PLAN:  (I50.9) Chronic congestive heart failure, unspecified congestive heart failure type (H)  (primary encounter diagnosis)  (I35.0) Calcific aortic valve stenosis  (Z95.0) Cardiac pacemaker in situ  Comment: Chronic and Stable  Plan:   -Last pacer check was 12/7/17  -VS weekly, weights are monthly in AL    (I48.2) Chronic a-fib (H)  (I10) Benign essential hypertension  (R60.0) Lower extremity edema  Comment: Chronic and stable  Plan:     -Losartan 100 mg po daily  -Lasix 40 mg po daily   -Potassium 20 mEq po daily  -Atenolol 50 mg po daily  -Nate Hose on am and off HS    (F43.21) Adjustment disorder with depressed mood  (R41.89) Cognitive impairment  Comment: Ongoing and stable  Plan:   -Citalopram 20 mg po qd  -Monitor mood and affect with adjustment to memory care unit    (E55.9) Vitamin D deficiency  Comment: Hx of  Plan:   -Cholecalciferol 5000 units po daily  -Lab level with next set of labs    (G47.00) Insomnia, unspecified  type  Comment: Chronic and stable  Plan:  -melatonin 3 mg po at HS          Electronically signed by:  JERED Deng CNP                    Sincerely,        JERED Deng CNP

## 2018-02-26 PROBLEM — G47.00 INSOMNIA, UNSPECIFIED TYPE: Status: ACTIVE | Noted: 2018-01-01

## 2018-02-26 PROBLEM — E55.9 VITAMIN D DEFICIENCY: Status: ACTIVE | Noted: 2018-01-01

## 2018-02-26 PROBLEM — I35.0 AORTIC STENOSIS: Status: ACTIVE | Noted: 2018-01-01

## 2018-02-26 PROBLEM — Z95.0 CARDIAC PACEMAKER IN SITU: Status: ACTIVE | Noted: 2018-01-01

## 2018-02-26 PROBLEM — R60.0 LOWER EXTREMITY EDEMA: Status: ACTIVE | Noted: 2018-01-01

## 2018-02-26 PROBLEM — I51.7 CARDIOMEGALY: Status: ACTIVE | Noted: 2018-01-01

## 2018-02-26 PROBLEM — F43.21 ADJUSTMENT DISORDER WITH DEPRESSED MOOD: Status: ACTIVE | Noted: 2018-01-01

## 2018-02-26 PROBLEM — I48.20 CHRONIC A-FIB (H): Status: ACTIVE | Noted: 2018-01-01

## 2018-02-27 NOTE — PROGRESS NOTES
[facility] now requests orders and shares plan of care/discharge summaries for some patients through [employer].  Please REPLY TO THIS MESSAGE in order to give authorization for orders when needed.  This is considered a verbal order, you will still receive a faxed copy of orders for signature.  Thank you for your assistance in improving collaboration for our patients.    Pt. Has LE weakness and is at a high risk for falls. Pt. Is newly moved into AL/memory care and can benefit from instruction/education for safety.    ORDER    PT 1W1, 2W2, 1W1 for instruction/progression of HEP, gait/balance/CGer training, acts/exs to improve stamina, and education in safety/transfers.      Becca Flanagan, PT  [email or web address]  [phone]

## 2018-03-01 ENCOUNTER — TRANSFERRED RECORDS (OUTPATIENT)
Dept: HEALTH INFORMATION MANAGEMENT | Facility: CLINIC | Age: 83
End: 2018-03-01

## 2018-03-01 LAB — INR PPP: 4.57 (ref 0.86–1.14)

## 2018-03-01 NOTE — PROGRESS NOTES
[place] [facility]    HPI:    Yolanda Keen is a 87 year old  (5/1/1930), who is being seen today for an episodic care visit at [facility].   HPI information obtained from: facility chart records. Today's concern is INR/Coumadin management for A. Fib    Bleeding Signs/Symptoms:  None  Thromboembolic Signs/Symptoms:  None    Medication Changes:  No  Dietary Changes:  No  Activity Changes: No  Bacterial/Viral Infection:  No    Missed Coumadin Doses:  None    On ASA: No    Other Concerns:  No    OBJECTIVE:    INR Today:  4.57  Current Dose: Coumadin 4 mg po daily    ASSESSMENT:  (Z51.81) Encounter for therapeutic drug monitoring  (primary encounter diagnosis)  (Z79.01) Long-term (current) use of anticoagulants    Supratherapeutic INR for goal of 2-3    PLAN:    New Dose: Hold dose today and tomorrow.      Next INR: 3/3/18        Electronically signed by:  JERED Deng CNP

## 2018-03-08 NOTE — PROGRESS NOTES
[place] [facility]    HPI:    Yolanda Keen is a 87 year old  (5/1/1930), who is being seen today for an episodic care visit at [facility].   HPI information obtained from: facility chart records. Today's concern is INR/Coumadin management for A. Fib/ CHF.    Bleeding Signs/Symptoms:  None  Thromboembolic Signs/Symptoms:  None    Medication Changes:  No  Dietary Changes:  No  Activity Changes: No  Bacterial/Viral Infection:  No    Missed Coumadin Doses: Yes. Missed dose per facility records on 3/6/18    On ASA: No    Other Concerns: Weight gain of 4 lbs this week. Family reports she is having SOB on exertion above baseline and increased difficultly with head not elevated in bed.    OBJECTIVE:  /79  Pulse 57  Resp 18  Wt 197 lb 8 oz (89.6 kg)  SpO2 96%  BMI 30.03 kg/m2  GENERAL APPEARANCE:  Alert, in no distress  ENT:  Mouth and posterior oropharynx normal, moist mucous membranes, [place], dentures upper  EYES:  EOM, conjunctivae, lids, pupils and irises normal, wears glasses  NECK:  No adenopathy,masses or thyromegaly  RESP:  respiratory effort and palpation of chest normal, lungs clear to auscultation   CV:  Palpation and auscultation of heart done , regular rate and rhythm, no murmur, rub, or gallop, +1 edema in legs, ankles and feet. Nate Hose  ABDOMEN:  normal bowel sounds, soft, nontender, no hepatosplenomegaly   M/S:   Up with SBA  SKIN:  Inspection of skin and subcutaneous tissue baseline        INR Today: 1.81  Current Dose: 2 mg Coumadin po qd    ASSESSMENT:  (Z51.81) Encounter for therapeutic drug monitoring  (primary encounter diagnosis)  (Z79.01) Long-term (current) use of anticoagulants  (150.31) Acute diastolic congestive heart failure (H)    Subtherapeutic INR for goal of 2-3    PLAN:    New Dose: 2 mg Coumadin po qd    Next INR: 1 week    Give an additional Lasix 20 mg po x 2 days for CHF. BMP on 3/15/18 day.      Electronically signed by:  JERED Deng  CNP

## 2018-03-08 NOTE — LETTER
3/8/2018        RE: Yolanda Keen  Care of Molly Anderson  [address]  [facility] [id number] [place] [address] GERIATRIC SERVICES    HPI:    Yolanda Keen is a 87 year old  (5/1/1930), who is being seen today for an episodic care visit at [facility].   HPI information obtained from: facility chart records. Today's concern is INR/Coumadin management for A. Fib    Bleeding Signs/Symptoms:  None  Thromboembolic Signs/Symptoms:  None    Medication Changes:  No  Dietary Changes:  No  Activity Changes: No  Bacterial/Viral Infection:  No    Missed Coumadin Doses: Yes. Missed dose per facility records on 3/6/18    On ASA: No    Other Concerns: Weight gain of 4 lbs this week. Family reports she is having SOB on exertion above baseline and increased difficultly with head not elevated in bed.    OBJECTIVE:  /79  Pulse 57  Resp 18  Wt 197 lb 8 oz (89.6 kg)  SpO2 96%  BMI 30.03 kg/m2  GENERAL APPEARANCE:  Alert, in no distress  ENT:  Mouth and posterior oropharynx normal, moist mucous membranes, [place], dentures upper  EYES:  EOM, conjunctivae, lids, pupils and irises normal, wears glasses  NECK:  No adenopathy,masses or thyromegaly  RESP:  respiratory effort and palpation of chest normal, lungs clear to auscultation   CV:  Palpation and auscultation of heart done , regular rate and rhythm, no murmur, rub, or gallop, +1 edema in legs, ankles and feet. Nate Hose  ABDOMEN:  normal bowel sounds, soft, nontender, no hepatosplenomegaly   M/S:   Up with SBA  SKIN:  Inspection of skin and subcutaneous tissue baseline        INR Today: 1.81  Current Dose: 2 mg Coumadin po qd    ASSESSMENT:  (Z51.81) Encounter for therapeutic drug monitoring  (primary encounter diagnosis)  (Z79.01) Long-term (current) use of anticoagulants  (150.31) Acute diastolic congestive heart failure (H)    Subtherapeutic INR for goal of 2-3    PLAN:    New Dose: 2 mg Coumadin po qd    Next INR: 1 week    Give an  additional Lasix 20 mg po x 2 days for CHF. BMP on 3/15/18 day.      Electronically signed by:  JERED Deng CNP          Sincerely,        JERED Deng CNP

## 2018-03-15 NOTE — PROGRESS NOTES
[place] [facility]    HPI:    Yolanda Keen is a 87 year old  (5/1/1930), who is being seen today for an episodic care visit at [facility].   HPI information obtained from: facility chart records. Today's concern is INR/Coumadin management for A. Fib/ CHF.    Bleeding Signs/Symptoms:  None  Thromboembolic Signs/Symptoms:  None    Medication Changes:  No  Dietary Changes:  No  Activity Changes: No  Bacterial/Viral Infection:  No    Missed Coumadin Doses: one dose last week    On ASA: No    INR Today: 1.47  Current Dose: 2 mg Coumadin po qd    ASSESSMENT:  (Z51.81) Encounter for therapeutic drug monitoring  (primary encounter diagnosis)  (Z79.01) Long-term (current) use of anticoagulants      Subtherapeutic INR for goal of 2-3    PLAN:    New Dose: 3 mg today and tomorrow. 2 mg po Coumadin rest of the week    Next INR: 1 week        Electronically signed by:  JERED Deng CNP

## 2018-03-19 NOTE — PROGRESS NOTES
Yolanda Keen is a 87 year old female seen March 14, 2018 at [facility] Memory [facility] unit where she has resided for one month (admit 2/2018) seen for initial visit.    Patient is seen in her room with her daughter Tammy present, and daughter helps with history, along with chart review.     Patient was hospitalized at [facility] in January with influenza A.   She had CHF with hypoxia.   Treated and stabilized, went to [facility], then transferred to [facility].   After a month of Rehab, she moved to this Memory Care unit as she was cognitively impaired to that point that she could not safely return to her home.   She is adjusting fairly well.   Has noted dyspnea at night and tries to get up.  Had a fall doing this last night, no significant injuries.   Edema has improved.   She denies CP, palpitations.       Past Medical History:   Diagnosis Date     Cardiac pacemaker in situ      Tachy-sanket syndrome (H)    AS, moderate  CHF, pEF  55-60% by ECHO 2/16/17    Chronic atrial fib / anticoagulation  HTN  Depression  Dementia, late onset  Vit D deficiency      Past Surgical History:   Procedure Laterality Date     C TOTAL KNEE ARTHROPLASTY Right 2003     CHOLECYSTECTOMY  1980     HYSTERECTOMY  1980     NEPHRECTOMY Left 1982     Social History   Substance Use Topics     Smoking status: Never Smoker     Smokeless tobacco: Not on file     Alcohol use Not on file      SH:  , patient has 12 children.    She was previously living alone in her home in [facility], with family support.     ROS:  SLUMS 9/30   CPT 3.6    H/o insomnia, may be related to orthopnea  Ambulates 300' with 4WW and SBA.   Transfers and ADLs CGA.       EXAM: Pleasant, NAD  /79  Pulse 57  Resp 18  Wt 197 lb 8 oz (89.6 kg)  SpO2 96%  BMI 30.03 kg/m2  Neck supple without adenopathy  Lungs clear bilaterally with fair air movement  Heart RRR s1s2@70, 2/6 HSM  Abd soft, NT, no distention, +BS  Ext 1+ edema  Neuro: no focal deficits, limited  history  Psych: affect okay     Last Basic Metabolic Panel:  Lab Results   Component Value Date     02/13/2018      Lab Results   Component Value Date    POTASSIUM 4.1 02/13/2018     Lab Results   Component Value Date    CHLORIDE 105 02/13/2018     Lab Results   Component Value Date    ERIN 9.1 02/13/2018     Lab Results   Component Value Date    CO2 32 02/13/2018     Lab Results   Component Value Date    BUN 22 02/13/2018     Lab Results   Component Value Date    CR 0.81 02/13/2018   GFR 67  Lab Results   Component Value Date    GLC 80 02/13/2018       IMP/PLAN:    (I50.9) Chronic congestive heart failure, unspecified congestive heart failure type (H)    (I35.0) Calcific aortic valve stenosis  Comment: with orthopnea, LE edema   Plan: patient would benefit from a hospital bed, to elevate HOB and make breathing easier at night.   Continue current dose of furosemide, follow weights and symptoms.        (I10) Benign essential hypertension  Comment: good control   Plan: continue atenolol, losartan      (I48.2) Chronic a-fib (H)  Comment: controlled VR on atenolol  Plan: warfarin per INR for stroke prophylaxis       (F43.21) Adjustment disorder with depressed mood  Comment: by history   Plan: continue citalopram and follow    (G30.1,  F02.80) Late onset Alzheimer's disease without behavioral disturbance  Comment: cognitive scores as above and low functional status  Plan: [facility] Care unit for meds, meals, cares, activity       (E55.9) Vitamin D deficiency  Comment: on replacement  Plan: same dose     Audrey Allan MD

## 2018-03-22 NOTE — PROGRESS NOTES
[place] [facility]    Chief Complaint   Patient presents with     Nursing Home Acute       HPI:    Yolanda Keen is a 87 year old  (5/1/1930), who is being seen today for an episodic care visit at [facility].  HPI information obtained from: facility chart records.Today's concern is:    Chronic congestive heart failure, unspecified congestive heart failure type (H)  Encounter for monitoring coumadin therapy  With pEF 55-60% from Echo 2/2017. Continues to have weight fluctuations on weekly basis. Today she denies pain, chest pain or shortness of breath but is not always accurate historian. INR dropped to 1.38. BP elevated today.      ALLERGIES: Lisinopril and Morphine  Past Medical, Surgical, Family and Social History reviewed and updated in [employer].    Current Outpatient Prescriptions   Medication Sig Dispense Refill     ACETAMINOPHEN PO Take 500 mg by mouth 3 times daily as needed for pain       Cholecalciferol 5000 UNITS TABS Take 1 capsule by mouth every morning       hydrocortisone (ANUSOL-HC) 2.5 % cream Place 1 applicator rectally 2 times daily as needed for hemorrhoids       ONDANSETRON PO Take 4 mg by mouth every 8 hours as needed for nausea or vomiting       CITALOPRAM HYDROBROMIDE PO Take 20 mg by mouth every morning       MELATONIN PO Take 3 mg by mouth nightly as needed       Dextromethorphan-guaiFENesin  MG/5ML syrup Take 10 mLs by mouth every 4 hours as needed for cough       FUROSEMIDE PO Take 40 mg by mouth every morning       LOSARTAN POTASSIUM PO Take 100 mg by mouth every morning       ATENOLOL PO Take 50 mg by mouth 2 times daily       WARFARIN SODIUM PO Take 5 mg by mouth daily        Potassium (POTASSIMIN PO) Take 20 mEq by mouth every morning       Patient Active Problem List   Diagnosis     Cognitive impairment     Benign essential hypertension     Calcific aortic valve stenosis     Chronic congestive heart failure, unspecified congestive heart failure type (H)      History of influenza     Chronic a-fib (H)     Cardiomegaly     Aortic stenosis     Vitamin D deficiency     Lower extremity edema     Insomnia, unspecified type     Adjustment disorder with depressed mood     Cardiac pacemaker in situ     Encounter for monitoring coumadin therapy       Medications reviewed:  Medications reconciled to facility chart and changes were made to reflect current medications as identified as above med list. Below are the changes that were made:   Medications stopped since last EPIC medication reconciliation:   Medications Discontinued During This Encounter   Medication Reason     albuterol (PROAIR HFA/PROVENTIL HFA/VENTOLIN HFA) 108 (90 BASE) MCG/ACT Inhaler Discontinued by another Health Care Provider       Medications started since last [facility] medication reconciliation:  No orders of the defined types were placed in this encounter.    REVIEW OF SYSTEMS:  Limited secondary to cognitive impairment but today pt reports ok    Physical Exam:  BP (!) 142/92  Pulse 83  Temp 97.9  F (36.6  C)  Resp 16  Wt 200 lb 14.4 oz (91.1 kg)  SpO2 95%  BMI 30.55 kg/m2  GENERAL APPEARANCE:  Alert, in no distress  ENT:  Mouth and posterior oropharynx normal, moist mucous membranes, [place], dentures upper  EYES:  EOM, conjunctivae, lids, pupils and irises normal, wears glasses  NECK:  No adenopathy,masses or thyromegaly  RESP:  respiratory effort and palpation of chest normal, lungs diminished, slight posterior wheezes  CV:  Palpation and auscultation of heart done , regular rate and rhythm, no murmur, rub, or gallop, trace edema in legs, ankles and feet. Nate Hose  ABDOMEN:  normal bowel sounds, soft, nontender, no hepatosplenomegaly   M/S:   Up with SBA  SKIN:  Inspection of skin and subcutaneous tissue baseline  NEURO:   Examination of sensation by touch normal  PSYCH:  insight and judgement impaired     Recent Labs:     Last Basic Metabolic Panel:  Recent Labs   Lab Test 03/15/18  02/13/18   0630   NA  140   140   POTASSIUM  4.2  4.1   CHLORIDE  106  105   ERIN  9.0  9.1   CO2  30  32   BUN  17  22   CR  0.64  0.81   GLC  84  80       Assessment/Plan:  (I50.9) Chronic congestive heart failure, unspecified congestive heart failure type (H)  (primary encounter diagnosis)  (Z51.81,  Z79.01) Encounter for monitoring coumadin therapy  Comment: Ongoing with medication adjustments  Plan:  -Weekly weights and BP  -Dose adjustments to coumadin based on INR  -Lasix increase to 50 mg po daily  -Follow BMP         Electronically signed by  JERED Deng CNP

## 2018-03-22 NOTE — LETTER
3/22/2018        RE: Yolanda Keen  Care of Molly Anderson  [address]  [facility] [address]        [place] [facility]    Chief Complaint   Patient presents with     Nursing Home Acute       HPI:    Yolanda Keen is a 87 year old  (5/1/1930), who is being seen today for an episodic care visit at [facility].  HPI information obtained from: facility chart records.Today's concern is:    Chronic congestive heart failure, unspecified congestive heart failure type (H)  Encounter for monitoring coumadin therapy  With pEF 55-60% from Echo 2/2017. Continues to have weight fluctuations on weekly basis. Today she denies pain, chest pain or shortness of breath but is not always accurate historian. INR dropped to 1.38. BP elevated today.      ALLERGIES: Lisinopril and Morphine  Past Medical, Surgical, Family and Social History reviewed and updated in [facility].    Current Outpatient Prescriptions   Medication Sig Dispense Refill     ACETAMINOPHEN PO Take 500 mg by mouth 3 times daily as needed for pain       Cholecalciferol 5000 UNITS TABS Take 1 capsule by mouth every morning       hydrocortisone (ANUSOL-HC) 2.5 % cream Place 1 applicator rectally 2 times daily as needed for hemorrhoids       ONDANSETRON PO Take 4 mg by mouth every 8 hours as needed for nausea or vomiting       CITALOPRAM HYDROBROMIDE PO Take 20 mg by mouth every morning       MELATONIN PO Take 3 mg by mouth nightly as needed       Dextromethorphan-guaiFENesin  MG/5ML syrup Take 10 mLs by mouth every 4 hours as needed for cough       FUROSEMIDE PO Take 40 mg by mouth every morning       LOSARTAN POTASSIUM PO Take 100 mg by mouth every morning       ATENOLOL PO Take 50 mg by mouth 2 times daily       WARFARIN SODIUM PO Take 5 mg by mouth daily        Potassium (POTASSIMIN PO) Take 20 mEq by mouth every morning       Patient Active Problem List   Diagnosis     Cognitive impairment     Benign essential hypertension      Calcific aortic valve stenosis     Chronic congestive heart failure, unspecified congestive heart failure type (H)     History of influenza     Chronic a-fib (H)     Cardiomegaly     Aortic stenosis     Vitamin D deficiency     Lower extremity edema     Insomnia, unspecified type     Adjustment disorder with depressed mood     Cardiac pacemaker in situ     Encounter for monitoring coumadin therapy       Medications reviewed:  Medications reconciled to facility chart and changes were made to reflect current medications as identified as above med list. Below are the changes that were made:   Medications stopped since last EPIC medication reconciliation:   Medications Discontinued During This Encounter   Medication Reason     albuterol (PROAIR HFA/PROVENTIL HFA/VENTOLIN HFA) 108 (90 BASE) MCG/ACT Inhaler Discontinued by another Health Care Provider       Medications started since last [facility] medication reconciliation:  No orders of the defined types were placed in this encounter.    REVIEW OF SYSTEMS:  Limited secondary to cognitive impairment but today pt reports ok    Physical Exam:  BP (!) 142/92  Pulse 83  Temp 97.9  F (36.6  C)  Resp 16  Wt 200 lb 14.4 oz (91.1 kg)  SpO2 95%  BMI 30.55 kg/m2  GENERAL APPEARANCE:  Alert, in no distress  ENT:  Mouth and posterior oropharynx normal, moist mucous membranes, [place], dentures upper  EYES:  EOM, conjunctivae, lids, pupils and irises normal, wears glasses  NECK:  No adenopathy,masses or thyromegaly  RESP:  respiratory effort and palpation of chest normal, lungs diminished, slight posterior wheezes  CV:  Palpation and auscultation of heart done , regular rate and rhythm, no murmur, rub, or gallop, trace edema in legs, ankles and feet. Nate Hose  ABDOMEN:  normal bowel sounds, soft, nontender, no hepatosplenomegaly   M/S:   Up with SBA  SKIN:  Inspection of skin and subcutaneous tissue baseline  NEURO:   Examination of sensation by touch normal  PSYCH:  insight and  judgement impaired     Recent Labs:     Last Basic Metabolic Panel:  Recent Labs   Lab Test 03/15/18  02/13/18   0630   NA  140  140   POTASSIUM  4.2  4.1   CHLORIDE  106  105   ERIN  9.0  9.1   CO2  30  32   BUN  17  22   CR  0.64  0.81   GLC  84  80       Assessment/Plan:  (I50.9) Chronic congestive heart failure, unspecified congestive heart failure type (H)  (primary encounter diagnosis)  (Z51.81,  Z79.01) Encounter for monitoring coumadin therapy  Comment: Ongoing with medication adjustments  Plan:  -Weekly weights and BP  -Dose adjustments to coumadin based on INR  -Lasix increase to 50 mg po daily  -Follow BMP         Electronically signed by  JERED Deng CNP                      Sincerely,        JERED Deng CNP

## 2018-03-24 PROBLEM — Z51.81 ENCOUNTER FOR MONITORING COUMADIN THERAPY: Status: ACTIVE | Noted: 2018-01-01

## 2018-03-24 PROBLEM — Z79.01 ENCOUNTER FOR MONITORING COUMADIN THERAPY: Status: ACTIVE | Noted: 2018-01-01

## 2018-03-27 NOTE — PROGRESS NOTES
[place] [facility]    HPI:    Yolanda Keen is a 87 year old  (5/1/1930), who is being seen today for an episodic care visit at [facility]-memory care.   HPI information obtained from: facility chart records. Today's concern is INR/Coumadin management for A. Fib    Bleeding Signs/Symptoms:  None  Thromboembolic Signs/Symptoms:  None    Medication Changes:  No  Dietary Changes:  No  Activity Changes: No  Bacterial/Viral Infection:  No    Missed Coumadin Doses:  None    On ASA: No    Other Concerns:  No    OBJECTIVE:    INR Today: 2.62  Current Dose: Coumadin 4 mg po daily    ASSESSMENT:  (Z51.81,  Z79.01) Encounter for monitoring coumadin therapy  (primary encounter diagnosis)    Therapeutic INR for goal of 2-3    PLAN:    New Dose: 2 mg po today and tomorrow      Next INR: 3/29/18        Electronically signed by:  JERED Deng CNP

## 2018-03-29 NOTE — PROGRESS NOTES
[place] [facility]    HPI:    Yolanda Keen is a 87 year old  (5/1/1930), who is being seen today for an episodic care visit at [facility]-memory care.   HPI information obtained from: facility chart records. Today's concern is INR/Coumadin management for A. Fib    Bleeding Signs/Symptoms:  None  Thromboembolic Signs/Symptoms:  None    Medication Changes:  No  Dietary Changes:  No  Activity Changes: No  Bacterial/Viral Infection:  No    Missed Coumadin Doses:  None    On ASA: No    Other Concerns:  No    OBJECTIVE:    INR Today: 2.86  Current Dose: 2 mg po today and tomorrow    ASSESSMENT:  (Z51.81,  Z79.01) Encounter for monitoring coumadin therapy  (primary encounter diagnosis)    Therapeutic INR for goal of 2-3    PLAN:    New Dose: 3 mg po daily coumadin     Next INR: 1 week        Electronically signed by:  JERED Deng CNP

## 2018-04-05 NOTE — LETTER
4/5/2018        RE: Yolanda Keen  Care of Molly Anderson  [address]  [facility] [address]          [place] [facility]    HPI:    Yolanda Keen is a 87 year old  (5/1/1930), who is being seen today for an episodic care visit at [facility]-memory care.  HPI information obtained from: facility chart records. Today's concern is INR/Coumadin management for A. Fib. Visited expanded to include concern of weight gain and s/s of CHF exacerbation.     Bleeding Signs/Symptoms:  None  Thromboembolic Signs/Symptoms:  None    Medication Changes:  No  Dietary Changes:  No  Activity Changes: No  Bacterial/Viral Infection:  No    Missed Coumadin Doses:  None    On ASA: No    Other Concerns: Yes. Recent dose adjustment to Lasix from 40 mg po daily to 50 mg po daily. Resident is approximately up 10 lbs from her base weight today at 204.1 lbs. Family reports increase shortness of breath and inability to sleep with head in recline position along with SOB on exertion. Seen today, she is in a recliner chair, unable to give much history related to dementia. She does report shortness of breath above baseline. She denies chest pain, abdominal pain, nausea or vomiting. Facility records show BP elevation on 4/3, 4/4 with improvement today.    OBJECTIVE:    VS: T 98.4 HR 56 /80 O2 91% Weight 204.1  GENERAL APPEARANCE:  Alert, in no distress  ENT:  Mouth and posterior oropharynx normal, moist mucous membranes, [place], dentures upper  EYES:  EOM, conjunctivae, lids, pupils and irises normal, wears glasses  NECK:  No adenopathy,masses or thyromegaly  RESP:  respiratory effort and palpation of chest normal, lungs diminished  CV:  Palpation and auscultation of heart done , regular rate and rhythm, no murmur, rub, or gallop, trace edema in legs, ankles and feet. Nate Hose  ABDOMEN:  normal bowel sounds, soft, nontender, no hepatosplenomegaly   M/S:   Up with SBA  SKIN:  Inspection of skin and subcutaneous tissue  baseline  NEURO:   Examination of sensation by touch normal  PSYCH:  insight and judgement impaired    INR Today: 3.08  Current Dose: 3 mg po daily    ASSESSMENT:    (Z51.81,  Z79.01) Encounter for monitoring coumadin therapy  (primary encounter diagnosis)  Comment: Therapeutic INR for goal of 2-3  Plan:   -New Dose: 2.5 mg po today and  3 mg po daily rest of days Next INR: 1 week    (I50.33) Acute on chronic diastolic congestive heart failure  Comment: Acute change and visit expanded  Plan:  -BMP 4/5  -CXR 4/6  -Lasix 40 mg po BID x 2 days. Previous plan of 2/19/18 with good effect  -Continue Potasium 20 mEq po qd  -Begin Lasix 50 mg po qd on 4/8/18  -BMP on 4/9/18  -Family will assist in daily weights as not covered in care plan.  -Atenolol 50 mg po BID. Continue to monitor.  -Losartan 100 mg po daily    NON-FACE TO FACE PROLONGED SERVICE    Resident is new to [place]. Discussion with her daughter Pauly who had many questions regarding her mother's CHF diagnosis. Discussed plan of care which she is in agreement with. Discussed cardiac diet or 2g NA diet that is not currently offered by the memory care unit and evaluated ongoing options. Discussed use of on call for emergency after hours need. Conversation began at 5:36 pm and ended at 6:24 pm for a total of 48 minutes.         Electronically signed by:  JERED Deng CNP                Sincerely,        JERED Deng CNP

## 2018-04-05 NOTE — PROGRESS NOTES
[place] [facility]    HPI:    Yolanda Keen is a 87 year old  (5/1/1930), who is being seen today for an episodic care visit at [facility].  HPI information obtained from: facility chart records. Today's concern is INR/Coumadin management for YAYO. Fib. Visited expanded to include concern of weight gain and s/s of CHF exacerbation.     Bleeding Signs/Symptoms:  None  Thromboembolic Signs/Symptoms:  None    Medication Changes:  No  Dietary Changes:  No  Activity Changes: No  Bacterial/Viral Infection:  No    Missed Coumadin Doses:  None    On ASA: No    Other Concerns: Yes. Recent dose adjustment to Lasix from 40 mg po daily to 50 mg po daily. Resident is approximately up 10 lbs from her base weight today at 204.1 lbs. Family reports increase shortness of breath and inability to sleep with head in recline position along with SOB on exertion. Seen today, she is in a recliner chair, unable to give much history related to dementia. She does report shortness of breath above baseline. She denies chest pain, abdominal pain, nausea or vomiting. Facility records show BP elevation on 4/3, 4/4 with improvement today.    OBJECTIVE:    VS: T 98.4 HR 56 /80 O2 91% Weight 204.1  GENERAL APPEARANCE:  Alert, in no distress  ENT:  Mouth and posterior oropharynx normal, moist mucous membranes, [place], dentures upper  EYES:  EOM, conjunctivae, lids, pupils and irises normal, wears glasses  NECK:  No adenopathy,masses or thyromegaly  RESP:  respiratory effort and palpation of chest normal, lungs diminished  CV:  Palpation and auscultation of heart done , regular rate and rhythm, no murmur, rub, or gallop, trace edema in legs, ankles and feet. Nate Hose  ABDOMEN:  normal bowel sounds, soft, nontender, no hepatosplenomegaly   M/S:   Up with SBA  SKIN:  Inspection of skin and subcutaneous tissue baseline  NEURO:   Examination of sensation by touch normal  PSYCH:  insight and judgement impaired    INR Today:  3.08  Current Dose: 3 mg po daily    ASSESSMENT:    (Z51.81,  Z79.01) Encounter for monitoring coumadin therapy  (primary encounter diagnosis)  Comment: Therapeutic INR for goal of 2-3  Plan:   -New Dose: 2.5 mg po today and  3 mg po daily rest of days Next INR: 1 week    (I50.33) Acute on chronic diastolic congestive heart failure  Comment: Acute change and visit expanded  Plan:  -BMP 4/5  -CXR 4/6  -Lasix 40 mg po BID x 2 days. Previous plan of 2/19/18 with good effect  -Continue Potasium 20 mEq po qd  -Begin Lasix 50 mg po qd on 4/8/18  -BMP on 4/9/18  -Family will assist in daily weights as not covered in care plan.  -Atenolol 50 mg po BID. Continue to monitor.  -Losartan 100 mg po daily    NON-FACE TO FACE PROLONGED SERVICE    Resident is new to AL. Discussion with her daughter Pauly who had many questions regarding her mother's CHF diagnosis. Discussed plan of care which she is in agreement with. Discussed cardiac diet or 2g NA diet that is not currently offered by the memory care unit and evaluated ongoing options. Discussed use of on call for emergency after hours need. Conversation began at 5:36 pm and ended at 6:24 pm for a total of 48 minutes.         Electronically signed by:  JERED Deng CNP

## 2018-04-12 NOTE — PROGRESS NOTES
[place] [facility]    Chief Complaint   Patient presents with     Nursing Home Acute       HPI:    Yolanda Keen is a 87 year old  (5/1/1930), who is being seen today for an episodic care visit at [facility].  HPI information obtained from: facility chart records.Today's concern is:    Chronic congestive heart failure, unspecified congestive heart failure type (H)  Continues to have weight gain, SOB with exertion and orthopnea. Increase of Lasix to 40 mg po BID x 2 days with good results but now back on 50 mg po daily ( which is increase from 40 mg daily) and weight is slowing increasing along with s/s. Chest X-ray was negative for acute changes but she is reporting more shortness of breath. VS within normal limits but weight gain of 5 lbs within the week.    ALLERGIES: Lisinopril and Morphine  Past Medical, Surgical, Family and Social History reviewed and updated in [facility].    Current Outpatient Prescriptions   Medication Sig Dispense Refill     Warfarin Therapy Reminder 1 each continuous prn For Goal INR of 2-3       ACETAMINOPHEN PO Take 500 mg by mouth 3 times daily as needed for pain       Cholecalciferol 5000 UNITS TABS Take 1 capsule by mouth every morning       hydrocortisone (ANUSOL-HC) 2.5 % cream Place 1 applicator rectally 2 times daily as needed for hemorrhoids       CITALOPRAM HYDROBROMIDE PO Take 20 mg by mouth every morning       Dextromethorphan-guaiFENesin  MG/5ML syrup Take 10 mLs by mouth every 4 hours as needed for cough       FUROSEMIDE PO Take 40 mg by mouth 2 times daily Take 40 mg po in the morning and 20 mg po in the afternoon       LOSARTAN POTASSIUM PO Take 100 mg by mouth every morning       ATENOLOL PO Take 50 mg by mouth 2 times daily       Potassium (POTASSIMIN PO) Take 20 mEq by mouth every morning       Patient Active Problem List   Diagnosis     Cognitive impairment     Benign essential hypertension     Calcific aortic valve stenosis     Chronic  congestive heart failure, unspecified congestive heart failure type (H)     History of influenza     Chronic a-fib (H)     Cardiomegaly     Aortic stenosis     Vitamin D deficiency     Lower extremity edema     Insomnia, unspecified type     Adjustment disorder with depressed mood     Cardiac pacemaker in situ     Encounter for monitoring coumadin therapy       Medications reviewed:  Medications reconciled to facility chart and changes were made to reflect current medications as identified as above med list. Below are the changes that were made:   Medications stopped since last EPIC medication reconciliation:   There are no discontinued medications.    Medications started since last [facility] medication reconciliation:  No orders of the defined types were placed in this encounter.    REVIEW OF SYSTEMS:  Limited secondary to cognitive impairment but today pt reports ok    Physical Exam:  /90  Pulse 81  Resp 18  Wt 205 lb 11.2 oz (93.3 kg)  SpO2 93%  BMI 31.28 kg/m2  GENERAL APPEARANCE:  Alert, in no distress  ENT:  Mouth and posterior oropharynx normal, moist mucous membranes, [place], dentures upper  EYES:  EOM, conjunctivae, lids, pupils and irises normal, wears glasses  NECK:  No adenopathy,masses or thyromegaly  RESP:  respiratory effort and palpation of chest normal, lungs diminished, slight posterior wheezes  CV:  Palpation and auscultation of heart done , regular rate and rhythm, 2/6 murmur, no rub, or gallop, +1 edema in legs, ankles and feet. Nate Hose  ABDOMEN:  normal bowel sounds, soft, nontender, no hepatosplenomegaly   M/S:   Up with SBA  SKIN:  Inspection of skin and subcutaneous tissue baseline  NEURO:   Examination of sensation by touch normal  PSYCH:  insight and judgement impaired    Recent Labs:     Last Basic Metabolic Panel:  Recent Labs   Lab Test 04/09/18 04/05/18   NA  139  142   POTASSIUM  4.1  3.9   CHLORIDE  104  105   ERIN  9.0  8.9   CO2  29  29   BUN  19  22   CR  0.71  0.79   GLC  79   136*     Assessment/Plan:  (I50.9) Chronic congestive heart failure, unspecified congestive heart failure type (H)  (primary encounter diagnosis)  Comment: Ongoing adjustment with fluid management  Plan:   -Lasix increased to 40 mg po in the am and 20 mg po in the afternoon  -Family is helping with daily weights memory care AL setting  -BMP next week  -Continued on Losartan 100 mg po daily  -Atenolol 50 mg po BID  -Potassium 20 mEq po daily      Electronically signed by  JERED Deng CNP

## 2018-04-12 NOTE — LETTER
4/12/2018        RE: Yolanda Keen  [facility]  [address]  [facility] [address]        [place] [facility]    Chief Complaint   Patient presents with     Nursing Home Acute       HPI:    Yolanda Keen is a 87 year old  (5/1/1930), who is being seen today for an episodic care visit at [facility].  HPI information obtained from: facility chart records.Today's concern is:    Chronic congestive heart failure, unspecified congestive heart failure type (H)  Continues to have weight gain, SOB with exertion and orthopnea. Increase of Lasix to 40 mg po BID x 2 days with good results but now back on 50 mg po daily ( which is increase from 40 mg daily) and weight is slowing increasing along with s/s. Chest X-ray was negative for acute changes but she is reporting more shortness of breath. VS within normal limits but weight gain of 5 lbs within the week.    ALLERGIES: Lisinopril and Morphine  Past Medical, Surgical, Family and Social History reviewed and updated in [facility].    Current Outpatient Prescriptions   Medication Sig Dispense Refill     Warfarin Therapy Reminder 1 each continuous prn For Goal INR of 2-3       ACETAMINOPHEN PO Take 500 mg by mouth 3 times daily as needed for pain       Cholecalciferol 5000 UNITS TABS Take 1 capsule by mouth every morning       hydrocortisone (ANUSOL-HC) 2.5 % cream Place 1 applicator rectally 2 times daily as needed for hemorrhoids       CITALOPRAM HYDROBROMIDE PO Take 20 mg by mouth every morning       Dextromethorphan-guaiFENesin  MG/5ML syrup Take 10 mLs by mouth every 4 hours as needed for cough       FUROSEMIDE PO Take 40 mg by mouth 2 times daily Take 40 mg po in the morning and 20 mg po in the afternoon       LOSARTAN POTASSIUM PO Take 100 mg by mouth every morning       ATENOLOL PO Take 50 mg by mouth 2 times daily       Potassium (POTASSIMIN PO) Take 20 mEq by mouth every morning       Patient Active Problem List    Diagnosis     Cognitive impairment     Benign essential hypertension     Calcific aortic valve stenosis     Chronic congestive heart failure, unspecified congestive heart failure type (H)     History of influenza     Chronic a-fib (H)     Cardiomegaly     Aortic stenosis     Vitamin D deficiency     Lower extremity edema     Insomnia, unspecified type     Adjustment disorder with depressed mood     Cardiac pacemaker in situ     Encounter for monitoring coumadin therapy       Medications reviewed:  Medications reconciled to facility chart and changes were made to reflect current medications as identified as above med list. Below are the changes that were made:   Medications stopped since last EPIC medication reconciliation:   There are no discontinued medications.    Medications started since last [facility] medication reconciliation:  No orders of the defined types were placed in this encounter.    REVIEW OF SYSTEMS:  Limited secondary to cognitive impairment but today pt reports ok    Physical Exam:  /90  Pulse 81  Resp 18  Wt 205 lb 11.2 oz (93.3 kg)  SpO2 93%  BMI 31.28 kg/m2  GENERAL APPEARANCE:  Alert, in no distress  ENT:  Mouth and posterior oropharynx normal, moist mucous membranes, [place], dentures upper  EYES:  EOM, conjunctivae, lids, pupils and irises normal, wears glasses  NECK:  No adenopathy,masses or thyromegaly  RESP:  respiratory effort and palpation of chest normal, lungs diminished, slight posterior wheezes  CV:  Palpation and auscultation of heart done , regular rate and rhythm, 2/6 murmur, no rub, or gallop, +1 edema in legs, ankles and feet. Nate Hose  ABDOMEN:  normal bowel sounds, soft, nontender, no hepatosplenomegaly   M/S:   Up with SBA  SKIN:  Inspection of skin and subcutaneous tissue baseline  NEURO:   Examination of sensation by touch normal  PSYCH:  insight and judgement impaired    Recent Labs:     Last Basic Metabolic Panel:  Recent Labs   Lab Test 04/09/18 04/05/18   NA  139   142   POTASSIUM  4.1  3.9   CHLORIDE  104  105   ERIN  9.0  8.9   CO2  29  29   BUN  19  22   CR  0.71  0.79   GLC  79  136*     Assessment/Plan:  (I50.9) Chronic congestive heart failure, unspecified congestive heart failure type (H)  (primary encounter diagnosis)  Comment: Ongoing adjustment with fluid management  Plan:   -Lasix increased to 40 mg po in the am and 20 mg po in the afternoon  -Family is helping with daily weights memory care AL setting  -BMP next week  -Continued on Losartan 100 mg po daily  -Atenolol 50 mg po BID  -Potassium 20 mEq po daily      Electronically signed by  JERED Deng CNP                      Sincerely,        JERED Deng CNP

## 2018-04-19 NOTE — PROGRESS NOTES
[place] [facility]    HPI:    Yolanda Keen is a 87 year old  (5/1/1930), who is being seen today for an episodic care visit at [facility].   HPI information obtained from: facility chart records. Today's concern is INR/Coumadin management for A. Fib    Bleeding Signs/Symptoms:  None  Thromboembolic Signs/Symptoms:  None    Medication Changes:  No  Dietary Changes:  No  Activity Changes: No  Bacterial/Viral Infection:  No    Missed Coumadin Doses:  None    On ASA: No    Other Concerns:  No    OBJECTIVE:    INR Today: 2.86  Current Dose: 2.5 mg po Thursday and Sunday and 3 mg po all other days    ASSESSMENT:  (Z51.81,  Z79.01) Encounter for monitoring coumadin therapy  (primary encounter diagnosis)    Therapeutic INR for goal of 2-3    PLAN:    New Dose: same    Next INR: 1 week        Electronically signed by:  JERED Deng CNP

## 2018-04-26 NOTE — PROGRESS NOTES
[place] [facility]    HPI:    Yolanda Keen is a 87 year old  (5/1/1930), who is being seen today for an episodic care visit at [facility].   HPI information obtained from: facility chart records. Today's concern is INR/Coumadin management for A. Fib    Bleeding Signs/Symptoms:  None  Thromboembolic Signs/Symptoms:  None    Medication Changes:  No  Dietary Changes:  No  Activity Changes: No  Bacterial/Viral Infection:  No    Missed Coumadin Doses:  None    On ASA: No    Other Concerns:  No    OBJECTIVE:    INR Today: 2.8  Current Dose: 2.5 mg po Thursday and Sunday and 3 mg po all other days    ASSESSMENT:  (Z51.81,  Z79.01) Encounter for monitoring coumadin therapy  (primary encounter diagnosis)    Therapeutic INR for goal of 2-3    PLAN:    New Dose: same    Next INR: 1 week        Electronically signed by:  JERED Deng CNP

## 2018-04-30 PROBLEM — Z71.89 ACP (ADVANCE CARE PLANNING): Chronic | Status: ACTIVE | Noted: 2018-01-01

## 2018-05-03 NOTE — LETTER
5/3/2018        RE: Yolanda Keen  [facility]  [address]  [facility] [address]        [place] [facility]    Chief Complaint   Patient presents with     intermediate Acute       [facility] Medical Record Number:  [id number]    HPI:    Yolanda Keen is a 88 year old  (5/1/1930), who is being seen today for an episodic care visit at [facility].  HPI information obtained from: facility chart records.Today's concern is:    Chronic congestive heart failure, unspecified congestive heart failure type (H)  Weights have been increasing over this past week. SOB with exertion and orthopnea are baseline. Recent lasix increase from 40 mg po daily to 50 mg po daily to 60 mg po daily (40 mg in the am and 20 mg po in the afternoon). Dry weight around 203 lbs with shoes and 193 lbs without shoes. Currently she is trending between 206-208 lbs.     Encounter for monitoring coumadin therapy  Continues on coumadin with goal range of 2-3      ALLERGIES: Lisinopril and Morphine  Past Medical, Surgical, Family and Social History reviewed and updated in [facility].    Current Outpatient Prescriptions   Medication Sig Dispense Refill     ACETAMINOPHEN PO Take 500 mg by mouth 3 times daily as needed for pain       ATENOLOL PO Take 50 mg by mouth 2 times daily       Cholecalciferol 5000 UNITS TABS Take 1 capsule by mouth every morning       CITALOPRAM HYDROBROMIDE PO Take 20 mg by mouth every morning       Dextromethorphan-guaiFENesin  MG/5ML syrup Take 10 mLs by mouth every 4 hours as needed for cough       FUROSEMIDE PO Take 40 mg by mouth 2 times daily Take 40 mg po in the morning and 20 mg po in the afternoon       hydrocortisone (ANUSOL-HC) 2.5 % cream Place 1 applicator rectally 2 times daily as needed for hemorrhoids       LOSARTAN POTASSIUM PO Take 100 mg by mouth every morning       Potassium (POTASSIMIN PO) Take 20 mEq by mouth every morning       Warfarin Therapy Reminder 1 each  continuous prn For Goal INR of 2-3       Patient Active Problem List   Diagnosis     Cognitive impairment     Benign essential hypertension     Calcific aortic valve stenosis     Chronic congestive heart failure, unspecified congestive heart failure type (H)     History of influenza     Chronic a-fib (H)     Cardiomegaly     Aortic stenosis     Vitamin D deficiency     Lower extremity edema     Insomnia, unspecified type     Adjustment disorder with depressed mood     Cardiac pacemaker in situ     Encounter for monitoring coumadin therapy     ACP (advance care planning)       Medications reviewed:  Medications reconciled to facility chart and changes were made to reflect current medications as identified as above med list. Below are the changes that were made:   Medications stopped since last EPIC medication reconciliation:   There are no discontinued medications.    Medications started since last [facility] medication reconciliation:  No orders of the defined types were placed in this encounter.    REVIEW OF SYSTEMS:  Limited secondary to cognitive impairment but today pt reports ok    Physical Exam:  /83  Pulse 63  Temp 98.2  F (36.8  C)  Resp 16  Wt 206 lb 9.6 oz (93.7 kg)  SpO2 95%  BMI 31.41 kg/m2  GENERAL APPEARANCE:  Alert, in no distress  ENT:  Mouth and posterior oropharynx normal, moist mucous membranes, [place], dentures upper  EYES:  EOM, conjunctivae, lids, pupils and irises normal, wears glasses  NECK:  No adenopathy,masses or thyromegaly  RESP:  respiratory effort and palpation of chest normal, lungs diminished, slight posterior wheezes  CV:  Palpation and auscultation of heart done , regular rate and rhythm, 2/6 murmur, no rub, or gallop, +2 edema in legs, ankles and feet. Nate Hose  ABDOMEN:  normal bowel sounds, soft, nontender, no hepatosplenomegaly   M/S:   Up with SBA  SKIN:  Inspection of skin and subcutaneous tissue baseline  NEURO:   Examination of sensation by touch normal  PSYCH:  insight  and judgement impaired    Recent Labs:     Last Basic Metabolic Panel:  Recent Labs   Lab Test 04/19/18 04/09/18   NA  140  139   POTASSIUM  4.1  4.1   CHLORIDE  104  104   ERIN  9.1  9.0   CO2  31  29   BUN  24  19   CR  0.80  0.71   GLC  80  79       Assessment/Plan:  (I50.9) Chronic congestive heart failure, unspecified congestive heart failure type (H)  (primary encounter diagnosis)  Comment: Diuresis as tolerated with fluid volume increase   Plan:   -Lasix 40 mg po BID x 2 days and then returning to 40 mg po am and 20 mg po afternoon  -BMP next week  -Weekly weights in AL Setting. Adjust lasix as needed    (Z51.81,  Z79.01) Encounter for monitoring coumadin therapy  Comment: INR is 3.26. Current dose is 2.5 mg po Thursday and Sunday and 3 mg po all other days  Plan:   -2.5 mg po Thurs, Sunday, Tuesday and 3 mg all other days. Recheck in 1 week      Electronically signed by  JERED Deng CNP                        Sincerely,        JERED Deng CNP

## 2018-05-03 NOTE — PROGRESS NOTES
[place] [facility]    Chief Complaint   Patient presents with     senior care Acute       [place] Medical Record Number:  [id number]    HPI:    Yolanda Keen is a 88 year old  (5/1/1930), who is being seen today for an episodic care visit at [facility].  HPI information obtained from: facility chart records.Today's concern is:    Chronic congestive heart failure, unspecified congestive heart failure type (H)  Weights have been increasing over this past week. SOB with exertion and orthopnea are baseline. Recent lasix increase from 40 mg po daily to 50 mg po daily to 60 mg po daily (40 mg in the am and 20 mg po in the afternoon). Dry weight around 203 lbs with shoes and 193 lbs without shoes. Currently she is trending between 206-208 lbs.     Encounter for monitoring coumadin therapy  Continues on coumadin with goal range of 2-3      ALLERGIES: Lisinopril and Morphine  Past Medical, Surgical, Family and Social History reviewed and updated in [facility].    Current Outpatient Prescriptions   Medication Sig Dispense Refill     ACETAMINOPHEN PO Take 500 mg by mouth 3 times daily as needed for pain       ATENOLOL PO Take 50 mg by mouth 2 times daily       Cholecalciferol 5000 UNITS TABS Take 1 capsule by mouth every morning       CITALOPRAM HYDROBROMIDE PO Take 20 mg by mouth every morning       Dextromethorphan-guaiFENesin  MG/5ML syrup Take 10 mLs by mouth every 4 hours as needed for cough       FUROSEMIDE PO Take 40 mg by mouth 2 times daily Take 40 mg po in the morning and 20 mg po in the afternoon       hydrocortisone (ANUSOL-HC) 2.5 % cream Place 1 applicator rectally 2 times daily as needed for hemorrhoids       LOSARTAN POTASSIUM PO Take 100 mg by mouth every morning       Potassium (POTASSIMIN PO) Take 20 mEq by mouth every morning       Warfarin Therapy Reminder 1 each continuous prn For Goal INR of 2-3       Patient Active Problem List   Diagnosis     Cognitive impairment     Benign  essential hypertension     Calcific aortic valve stenosis     Chronic congestive heart failure, unspecified congestive heart failure type (H)     History of influenza     Chronic a-fib (H)     Cardiomegaly     Aortic stenosis     Vitamin D deficiency     Lower extremity edema     Insomnia, unspecified type     Adjustment disorder with depressed mood     Cardiac pacemaker in situ     Encounter for monitoring coumadin therapy     ACP (advance care planning)       Medications reviewed:  Medications reconciled to facility chart and changes were made to reflect current medications as identified as above med list. Below are the changes that were made:   Medications stopped since last EPIC medication reconciliation:   There are no discontinued medications.    Medications started since last [facility] medication reconciliation:  No orders of the defined types were placed in this encounter.    REVIEW OF SYSTEMS:  Limited secondary to cognitive impairment but today pt reports ok    Physical Exam:  /83  Pulse 63  Temp 98.2  F (36.8  C)  Resp 16  Wt 206 lb 9.6 oz (93.7 kg)  SpO2 95%  BMI 31.41 kg/m2  GENERAL APPEARANCE:  Alert, in no distress  ENT:  Mouth and posterior oropharynx normal, moist mucous membranes, [place], dentures upper  EYES:  EOM, conjunctivae, lids, pupils and irises normal, wears glasses  NECK:  No adenopathy,masses or thyromegaly  RESP:  respiratory effort and palpation of chest normal, lungs diminished, slight posterior wheezes  CV:  Palpation and auscultation of heart done , regular rate and rhythm, 2/6 murmur, no rub, or gallop, +2 edema in legs, ankles and feet. Nate Hose  ABDOMEN:  normal bowel sounds, soft, nontender, no hepatosplenomegaly   M/S:   Up with SBA  SKIN:  Inspection of skin and subcutaneous tissue baseline  NEURO:   Examination of sensation by touch normal  PSYCH:  insight and judgement impaired    Recent Labs:     Last Basic Metabolic Panel:  Recent Labs   Lab Test 04/19/18 04/09/18   NA   140  139   POTASSIUM  4.1  4.1   CHLORIDE  104  104   ERIN  9.1  9.0   CO2  31  29   BUN  24  19   CR  0.80  0.71   GLC  80  79       Assessment/Plan:  (I50.9) Chronic congestive heart failure, unspecified congestive heart failure type (H)  (primary encounter diagnosis)  Comment: Diuresis as tolerated with fluid volume increase   Plan:   -Lasix 40 mg po BID x 2 days and then returning to 40 mg po am and 20 mg po afternoon  -BMP next week  -Weekly weights in AL Setting. Adjust lasix as needed    (Z51.81,  Z79.01) Encounter for monitoring coumadin therapy  Comment: INR is 3.26. Current dose is 2.5 mg po Thursday and Sunday and 3 mg po all other days  Plan:   -2.5 mg po Thurs, Sunday, Tuesday and 3 mg all other days. Recheck in 1 week      Electronically signed by  JERED Deng CNP

## 2018-05-10 NOTE — PROGRESS NOTES
[facility]    HPI:    Yolanda Keen is a 87 year old  (5/1/1930), who is being seen today for an episodic care visit at [facility]memory care.   HPI information obtained from: facility chart records. Today's concern is INR/Coumadin management for A. Fib    Bleeding Signs/Symptoms:  None  Thromboembolic Signs/Symptoms:  None    Medication Changes:  No  Dietary Changes:  No  Activity Changes: No  Bacterial/Viral Infection:  No    Missed Coumadin Doses:  None    On ASA: No    Other Concerns:  Have been adjusting Lasix. Current BMP today is WNL    OBJECTIVE:    INR Today: 3.01  Current Dose: 2.5 mg po Tuesday, Thursday and Sunday and 3 mg po all other days    ASSESSMENT:  (Z51.81,  Z79.01) Encounter for monitoring coumadin therapy  (primary encounter diagnosis)  (I48.2) Chronic a-fib (H)    Therapeutic INR for goal of 2-3    PLAN:    New Dose: same    Next INR: 1 week        Electronically signed by:  JERED Deng CNP

## 2018-05-17 NOTE — LETTER
5/17/2018        RE: Yolanda Keen  [facility]  [address]  [facility] [address]        [facility]    Chief Complaint   Patient presents with     Nursing Home Acute     INR RESULTS       Transfer Medical Record Number:  [id number]    HPI:    Yolanda Keen is a 88 year old  (5/1/1930), who is being seen today for an episodic care visit at [facility].  HPI information obtained from: facility chart records.Today's concern is:    Encounter for monitoring coumadin therapy  For a-fib. Coumadin has been less stable with move from [facility] to [facility]. INR today is 3.49    Bradycardia  Cardiac pacemaker in situ. Hx of condition. HR with vitals BID. Average in the am is 60 and in eves is 63. Atenolol, Lasix, Losartan for cardiac management.       ALLERGIES: Lisinopril and Morphine  Past Medical, Surgical, Family and Social History reviewed and updated in [employer].    Current Outpatient Prescriptions   Medication Sig Dispense Refill     ACETAMINOPHEN PO Take 500 mg by mouth 3 times daily as needed for pain       ATENOLOL PO Take 50 mg by mouth 2 times daily       Cholecalciferol 5000 UNITS TABS Take 1 capsule by mouth every morning       CITALOPRAM HYDROBROMIDE PO Take 20 mg by mouth every morning       Dextromethorphan-guaiFENesin  MG/5ML syrup Take 10 mLs by mouth every 4 hours as needed for cough       FUROSEMIDE PO Take 40 mg by mouth 2 times daily Take 40 mg po in the morning and 20 mg po in the afternoon       hydrocortisone (ANUSOL-HC) 2.5 % cream Place 1 applicator rectally 2 times daily as needed for hemorrhoids       LOSARTAN POTASSIUM PO Take 100 mg by mouth every morning       Potassium (POTASSIMIN PO) Take 20 mEq by mouth every morning       Warfarin Therapy Reminder 1 each continuous prn For Goal INR of 2-3       Patient Active Problem List   Diagnosis     Cognitive impairment     Benign essential hypertension     Calcific aortic valve stenosis      Chronic congestive heart failure, unspecified congestive heart failure type (H)     History of influenza     Chronic a-fib (H)     Cardiomegaly     Aortic stenosis     Vitamin D deficiency     Lower extremity edema     Insomnia, unspecified type     Adjustment disorder with depressed mood     Cardiac pacemaker in situ     Encounter for monitoring coumadin therapy     ACP (advance care planning)       Medications reviewed:  Medications reconciled to facility chart and changes were made to reflect current medications as identified as above med list. Below are the changes that were made:   Medications stopped since last EPIC medication reconciliation:   There are no discontinued medications.    Medications started since last [facility] medication reconciliation:  No orders of the defined types were placed in this encounter.    REVIEW OF SYSTEMS:  Limited secondary to cognitive impairment but today pt reports fine    Physical Exam:  /82  Pulse 58  Temp 98.8  F (37.1  C)  Resp 16  Wt 205 lb (93 kg)  SpO2 97%  BMI 31.17 kg/m2  GENERAL APPEARANCE:  Alert, in no distress  ENT:  Mouth and posterior oropharynx normal, moist mucous membranes, [place], dentures upper  EYES:  EOM, conjunctivae, lids, pupils and irises normal, wears glasses  NECK:  No adenopathy,masses or thyromegaly  RESP:  respiratory effort and palpation of chest normal, lungs diminished, slight posterior wheezes  CV:  Palpation and auscultation of heart done , regular rate and rhythm, 2/6 murmur, no rub, or gallop, +2 edema in legs, ankles and feet. Nate Hose  ABDOMEN:  normal bowel sounds, soft, nontender, no hepatosplenomegaly   M/S:   Up with SBA and walker  SKIN:  Inspection of skin and subcutaneous tissue baseline  NEURO:   Examination of sensation by touch normal  PSYCH:  insight and judgement impaired    Recent Labs:     Last Basic Metabolic Panel:  Recent Labs   Lab Test 05/10/18 04/19/18   NA  144  140   POTASSIUM  4.0  4.1   CHLORIDE  107  104    ERIN  9.3  9.1   CO2  32  31   BUN  20  24   CR  0.72  0.80   GLC  80  80       Assessment/Plan:  (Z51.81,  Z79.01) Encounter for monitoring coumadin therapy  (primary encounter diagnosis)  Comment: Chronic  Plan:   -Coumadin dosing for INR goal range 2-3  -Coumadin hold today and 2.5 mg po all other days with recheck 5/21    (R00.1) Bradycardia  Comment: Hx of condition  Plan:  -Atenolol 50 mg BID with hold parameters increased from HR< 55 to HR < 60  -Continue with Lasix 40 mg po qd in the am and 20 mg po qd in the afternoon  -Losartan 100 mg po qd holding if SBP <100 or HR < 60        Electronically signed by  JERED Deng CNP                      Sincerely,        JERED Deng CNP

## 2018-05-17 NOTE — PROGRESS NOTES
[facility]    Chief Complaint   Patient presents with     Nursing Home Acute     INR RESULTS       [facility] Medical Record Number:  [id number]    HPI:    Yolanda Keen is a 88 year old  (5/1/1930), who is being seen today for an episodic care visit at [facility].  HPI information obtained from: facility chart records.Today's concern is:    Encounter for monitoring coumadin therapy  For a-fib. Coumadin has been less stable with move from [facility] to [facility]. INR today is 3.49    Bradycardia  Cardiac pacemaker in situ. Hx of condition. HR with vitals BID. Average in the am is 60 and in eves is 63. Atenolol, Lasix, Losartan for cardiac management.       ALLERGIES: Lisinopril and Morphine  Past Medical, Surgical, Family and Social History reviewed and updated in [employer].    Current Outpatient Prescriptions   Medication Sig Dispense Refill     ACETAMINOPHEN PO Take 500 mg by mouth 3 times daily as needed for pain       ATENOLOL PO Take 50 mg by mouth 2 times daily       Cholecalciferol 5000 UNITS TABS Take 1 capsule by mouth every morning       CITALOPRAM HYDROBROMIDE PO Take 20 mg by mouth every morning       Dextromethorphan-guaiFENesin  MG/5ML syrup Take 10 mLs by mouth every 4 hours as needed for cough       FUROSEMIDE PO Take 40 mg by mouth 2 times daily Take 40 mg po in the morning and 20 mg po in the afternoon       hydrocortisone (ANUSOL-HC) 2.5 % cream Place 1 applicator rectally 2 times daily as needed for hemorrhoids       LOSARTAN POTASSIUM PO Take 100 mg by mouth every morning       Potassium (POTASSIMIN PO) Take 20 mEq by mouth every morning       Warfarin Therapy Reminder 1 each continuous prn For Goal INR of 2-3       Patient Active Problem List   Diagnosis     Cognitive impairment     Benign essential hypertension     Calcific aortic valve stenosis     Chronic congestive heart failure, unspecified congestive heart failure type (H)     History of influenza     Chronic  a-fib (H)     Cardiomegaly     Aortic stenosis     Vitamin D deficiency     Lower extremity edema     Insomnia, unspecified type     Adjustment disorder with depressed mood     Cardiac pacemaker in situ     Encounter for monitoring coumadin therapy     ACP (advance care planning)       Medications reviewed:  Medications reconciled to facility chart and changes were made to reflect current medications as identified as above med list. Below are the changes that were made:   Medications stopped since last EPIC medication reconciliation:   There are no discontinued medications.    Medications started since last [facility] medication reconciliation:  No orders of the defined types were placed in this encounter.    REVIEW OF SYSTEMS:  Limited secondary to cognitive impairment but today pt reports fine    Physical Exam:  /82  Pulse 58  Temp 98.8  F (37.1  C)  Resp 16  Wt 205 lb (93 kg)  SpO2 97%  BMI 31.17 kg/m2  GENERAL APPEARANCE:  Alert, in no distress  ENT:  Mouth and posterior oropharynx normal, moist mucous membranes, [place], dentures upper  EYES:  EOM, conjunctivae, lids, pupils and irises normal, wears glasses  NECK:  No adenopathy,masses or thyromegaly  RESP:  respiratory effort and palpation of chest normal, lungs diminished, slight posterior wheezes  CV:  Palpation and auscultation of heart done , regular rate and rhythm, 2/6 murmur, no rub, or gallop, +2 edema in legs, ankles and feet. Nate Hose  ABDOMEN:  normal bowel sounds, soft, nontender, no hepatosplenomegaly   M/S:   Up with SBA and walker  SKIN:  Inspection of skin and subcutaneous tissue baseline  NEURO:   Examination of sensation by touch normal  PSYCH:  insight and judgement impaired    Recent Labs:     Last Basic Metabolic Panel:  Recent Labs   Lab Test 05/10/18 04/19/18   NA  144  140   POTASSIUM  4.0  4.1   CHLORIDE  107  104   ERIN  9.3  9.1   CO2  32  31   BUN  20  24   CR  0.72  0.80   GLC  80  80       Assessment/Plan:  (Z51.81,  Z79.01)  Encounter for monitoring coumadin therapy  (primary encounter diagnosis)  Comment: Chronic  Plan:   -Coumadin dosing for INR goal range 2-3  -Coumadin hold today and 2.5 mg po all other days with recheck 5/21    (R00.1) Bradycardia  Comment: Hx of condition  Plan:  -Atenolol 50 mg BID with hold parameters increased from HR< 55 to HR < 60  -Continue with Lasix 40 mg po qd in the am and 20 mg po qd in the afternoon  -Losartan 100 mg po qd holding if SBP <100 or HR < 60        Electronically signed by  JERED Deng CNP

## 2018-05-31 NOTE — PROGRESS NOTES
[facility]    HPI:    Yolanda Keen is a 87 year old  (5/1/1930), who is being seen today for an episodic care visit at [facility].   HPI information obtained from: facility chart records. Today's concern is INR/Coumadin management for A. Fib    Bleeding Signs/Symptoms:  None  Thromboembolic Signs/Symptoms:  None    Medication Changes:  No  Dietary Changes:  No  Activity Changes: No  Bacterial/Viral Infection:  No    Missed Coumadin Doses:  None    On ASA: No    Other Concerns:  Have been adjusting Lasix. Current BMP today is WNL. Orders for CXR and CBC    OBJECTIVE:    INR Today: 2.26  Current Dose:  2.5 mg po daily    ASSESSMENT:  (Z51.81,  Z79.01) Encounter for monitoring coumadin therapy  (primary encounter diagnosis)  (I48.2) Chronic a-fib (H)    Therapeutic INR for goal of 2-3    PLAN:    New Dose: same    Next INR: 1 week        Electronically signed by:  JERED Deng CNP

## 2018-06-01 PROBLEM — I50.9 CHF (CONGESTIVE HEART FAILURE) (H): Status: ACTIVE | Noted: 2018-01-01

## 2018-06-01 NOTE — ED PROVIDER NOTES
History     Chief Complaint:  shortness of breath     HPI The patient's HPI is limited due to the baseline mental status of the patient.    Yolanda Keen is a 88 year old female, with a history of dementia, CHF, and atrial fibrillation, who presents with shortness of breath from her memory care facility. Per nurse's report, the patient has had a series of chest XR at her memory care facility and noticed worsening changes. They increased her dosage of Lasix, but her chest XR today was concerning, prompting her ED visit. Here, the patient states that she has no complaints. The patient's daughter states that she has been concerned with the patient's retention of fluid and her shortness of breath and blood pressure have been fluctuating. She notes that her doctor and facility have been adjusting Lasix every few days.     Allergies:  Lisinopril  Morphine      Medications:    Atenolol  Cholecalciferol  Citalopram  Dextromethorphan  Furosemide  Losartan  Warfarin    Past Medical History:    Cardiac pacemaker in situ  Tachy-sanket syndrome  CHF  Calcific aortic valve stenosis  Atrial fibrillation     Past Surgical History:    Total knee arthroplasty  Cholecystectomy  Hysterectomy  Nephrectomy    Family History:    No past pertinent family history.     Social History:  Presents with her daughter   Negative for alcohol use.  Negative for tobacco use.   Marital Status:   [5]     Review of Systems  The patient's ROS is limited due to the mental status of the patient.       Physical Exam   First Vitals:  BP: 135/74  Pulse: 72  Heart Rate: 72  Temp: 98.2  F (36.8  C)  Resp: 18  Weight: 93.9 kg (207 lb)  SpO2: 94 %    Physical Exam  Constitutional: Patient is well appearing. No distress.  Head: Atraumatic.  Mouth/Throat: Oropharynx is clear and moist. No oropharyngeal exudate.  Eyes: Conjunctivae and EOM are normal. No scleral icterus.  Neck: Normal range of motion. Neck supple.   Cardiovascular: Normal rate, regular  rhythm, normal heart sounds and intact distal pulses.   Pulmonary/Chest: Breath sounds normal. No respiratory distress.  Abdominal: Soft. Bowel sounds are normal. No distension. No tenderness. No rebound or guarding.   Musculoskeletal: Normal range of motion. No edema or tenderness.   Neurological: Alert and orientated to person, place, and time. No observable focal neuro deficit  Skin: Warm and dry. No rash noted. Not diaphoretic.      Emergency Department Course   ECG:  Indication: shortness of breath   Time: 1133  Vent. Rate 80 bpm. PA interval *. QRS duration 192. QT/QTc 500/576. P-R-T axis * -68 104. Ventricular paced rhythm. Abnormal ECG. Read time: 1145.      Imaging:  Radiographic findings were communicated with the patient and patient's daughter who voiced understanding of the findings.  XR Chest 2 views:   Enlarged cardiac silhouette. Likely mild pulmonary edema.  Single-lead implanted cardiac pacer over the left chest. No pleural  effusion or pneumothorax. As per radiology.     Laboratory:  CBC: WBC: 6.1, HGB: 12.6, PLT: 171  BMP: Carbon dioxide: 35 (H), anion gap: 2 (L) o/w WNL (Creatinine: 0.84)    Troponin: <0.015  BNP: 2475 (H)  INR: 1.97 (H)  UA with micro: small blood, positive nitrite, moderate bacteria, WBC: 6 (H), small leukocyte esterase, o/w negative     Interventions:  1350 Lasix, 40 mg, IV  1355 Rocephin, 1 g, IV     Emergency Department Course:  Nursing notes and vitals reviewed. EKG was obtained, findings above.      1156  I performed an exam of the patient as documented above.     IV inserted. Medicine administered as documented above. Blood drawn. This was sent to the lab for further testing, results above. The patient provided a urine sample here in the emergency department. This was sent for laboratory testing, findings above.      The patient was sent for a chest XR while in the emergency department, findings above.     1345  I consulted with Dr. Isidro of the hospitalist services.  They are in agreement to accept the patient for admission.    Findings and plan explained to the Patient and daughter who consents to admission. Discussed the patient with Dr. Isirdo, who will admit the patient to a medical bed for further monitoring, evaluation, and treatment.      Impression & Plan      Medical Decision Making:  Admit at family request for dyspnea likely worsening heart failure and double covered for uti.  Discussed with family that [facility] does not match their requests and hospitalist aware.      Diagnosis:    ICD-10-CM   1. Shortness of breath R06.02   2. Elevated brain natriuretic peptide (BNP) level R79.89   3. Acute cystitis without hematuria N30.00       Disposition:  Admitted to Dr. Sanna CALVO, Preethi Weinstein, am serving as a scribe on 6/1/2018 at 11:50 AM to personally document services performed by Garrick Peña MD based on my observations and the provider's statements to me.      Preethi Weinstein  6/1/2018   [facility] EMERGENCY DEPARTMENT       Garrick Peña MD  06/01/18 [id number]

## 2018-06-01 NOTE — LETTER
Key Recommendations: pt admitted with CHF/UTI. Pt received IV diuretics and IV antibiotics while here in the hospital. Pt should be following a low sodium diet but specialty diets are not offered at her [facility] care facility. Currently she was on daily weights until 6/4 at [facility] but  staff stated that order would end 6/4. Changes at discharge were ***.      Amber Glasgow    AVS/Discharge Summary is the source of truth; this is a helpful guide for improved communication of patient story

## 2018-06-01 NOTE — ED TRIAGE NOTES
Patient presents with complaints of SOB over the past few days. Family states that there has been changes in her chest X-ray over the past few days. Patient does have history of CHF. ABC intact without need for intervention at this time.

## 2018-06-01 NOTE — TELEPHONE ENCOUNTER
Resident with increased SOB from baseline and weight increase. CXR, BMP,CBC do not show acute etiology. Lasix increase to 40 mg po BID x 3 days started yesterday with relief but she is symptomatic today and family plans to take her to ED for further evaluation.

## 2018-06-01 NOTE — ED NOTES
[facility]  ED Nurse Handoff Report    Yolanda Keen is a 88 year old female   ED Chief complaint: Shortness of Breath  . ED Diagnosis:   Final diagnoses:   Shortness of breath   Elevated brain natriuretic peptide (BNP) level   Acute cystitis without hematuria     Allergies:   Allergies   Allergen Reactions     Lisinopril      Morphine        Code Status: DNR / DNI  Activity level - Baseline/Home:  Stand with Assist of 2. Activity Level - Current:   Stand with Assist of 2. Lift room needed: Yes. Bariatric: No   Needed: No   Isolation: No. Infection: Not Applicable.     Vital Signs:   Vitals:    06/01/18 1330 06/01/18 1345 06/01/18 1400 06/01/18 1415   BP: (!) 157/98 155/82  (!) 155/117   Pulse:       Resp: 18 11 15    Temp:       SpO2:       Weight:           Cardiac Rhythm:  ,      Pain level:    Patient confused: Yes. Patient Falls Risk: Yes.   Elimination Status: Urethral catheter (díaz) in place; refer to orders to discontinue as per protocol    Patient Report - Initial Complaint: Shortness of breath. Focused Assessment: Patient presents with complaints of SOB over the past few days. Family states that there has been changes in her chest X-ray over the past few days. Patient does have history of CHF. ABC intact without need for intervention at this time.    Tests Performed: labs, CXR, UA. Abnormal Results: UA, INR, BNP .   Treatments provided: IV lasix, díaz catheter  Family Comments: Family present  OBS brochure/video discussed/provided to patient:  N/A  ED Medications:   Medications   cefTRIAXone (ROCEPHIN) 1 g vial to attach to  mL bag for ADULTS or NS 50 mL bag for PEDS (0 g Intravenous Stopped 6/1/18 1355)   furosemide (LASIX) injection 40 mg (40 mg Intravenous Given 6/1/18 1350)     Drips infusing:  No  For the majority of the shift, the patient's behavior Green. Interventions performed were NA.     Severe Sepsis OR Septic Shock Diagnosis Present: No      ED Nurse  Name/Phone Number: Sis García,   2:30 PM    RECEIVING UNIT ED HANDOFF REVIEW    Above ED Nurse Handoff Report was reviewed: Yes  Reviewed by: Jenna Brink on June 1, 2018 at 2:49 PM

## 2018-06-01 NOTE — IP AVS SNAPSHOT
MRN:[id number]                      After Visit Summary   6/1/2018    Yolanda Keen    MRN: [id number]           Thank you!     Thank you for choosing [facility] for your care. Our goal is always to provide you with excellent care. Hearing back from our patients is one way we can continue to improve our services. Please take a few minutes to complete the written survey that you may receive in the mail after you visit. If you would like to speak to someone directly about your visit please contact Patient Relations at [phone]. Thank you!          Patient Information     Date Of Birth          5/1/1930        Designated Caregiver       Most Recent Value    Caregiver    Will someone help with your care after discharge? yes    Name of designated caregiver Molly Anderson    Phone number of caregiver [phone]    Caregiver address [address] [place] [address]       About your hospital stay     You were admitted on:  June 1, 2018 You last received care in the:  [place] [address] [facility]    You were discharged on:  June 5, 2018        Reason for your hospital stay       E Coli and Enterococcus UTI. CHF decompensation.                  Who to Call     For medical emergencies, please call 911.  For non-urgent questions about your medical care, please call your primary care provider or clinic, [phone]          Attending Provider     Provider Specialty    Garrick Peña MD Emergency Medicine    [facility], Malcolm Carver MD Internal Medicine       Primary Care Provider Office Phone # Fax #    Lupillo Brasher, JERED [phone]      After Care Instructions     Activity       Your activity upon discharge: activity as tolerated            Diet       Follow this diet upon discharge:     2 gm NA Diet                  Follow-up Appointments     Follow-up and recommended labs and tests        Follow up with primary care provider, Lupillo Brasher,  within 7 days for hospital follow- up.  No follow up labs or test are needed.                  Additional Services     Home Care OT Referral for Hospital Discharge       OT to eval and treat    Your provider has ordered home care - occupational therapy. If you have not been contacted within 2 days of your discharge please call the department phone number listed on the top of this document.            Home Care PT Referral for Hospital Discharge       PT to eval and treat    Your provider has ordered home care - physical therapy. If you have not been contacted within 2 days of your discharge please call the department phone number listed on the top of this document.                  Warfarin Instruction     You have started taking a medicine called warfarin. This is a blood-thinning medicine (anticoagulant). It helps prevent and treat blood clots.      Before leaving the hospital, make sure you know how much to take and how long to take it.      You will need regular blood tests to make sure your blood is clotting safely. It is very important to see your doctor for regular blood tests.    Talk to your doctor before taking any new medicine (this includes over-the-counter drugs and herbal products). Many medicines can interact with warfarin. This may cause more bleeding or too much clotting.     Eating a lot of vitamin K--found in green, leafy vegetables--can change the way warfarin works in your body. Do NOT avoid these foods. Instead, try to eat the same amount each day.     Bleeding is the most common side-effect of warfarin. You may notice bleeding gums, a bloody nose, bruises and bleeding longer when you cut yourself. See a doctor at once if:   o You cough up blood  o You find blood in your stool (poop)  o You have a deep cut, or a cut that bleeds longer than 10 minutes   o You have a bad cut, hard fall, accident or hit your head (go to urgent care or the emergency room).    For women who can get pregnant: This  "medicine can harm an unborn baby. Be very careful not to get pregnant while taking this medicine. If you think you might be pregnant, call your doctor right away.    For more information, read \"Guide to Warfarin Therapy,  the booklet you received in the hospital.        Pending Results     Date and Time Order Name Status Description    2018 0816 Urine Culture Aerobic Bacterial Preliminary             Statement of Approval     Ordered          18 0900  I have reviewed and agree with all the recommendations and orders detailed in this document.  EFFECTIVE NOW     Approved and electronically signed by:  Manohar Ulrich MD             Admission Information     Date & Time Provider Department Dept. Phone    2018 Malcolm Isidro MD [place] [address] [facility] [phone]      Your Vitals Were     Blood Pressure Pulse Temperature Respirations Height Weight    127/62 (BP Location: Right arm) 55 97  F (36.1  C) (Oral) 18 1.727 m (5' 8\") 93.7 kg (206 lb 8 oz)    Pulse Oximetry BMI (Body Mass Index)                93% 31.4 kg/m2          [employer] Information     [employer] lets you send messages to your doctor, view your test results, renew your prescriptions, schedule appointments and more. To sign up, go to [email or web address] . Click on \"Log in\" on the left side of the screen, which will take you to the Welcome page. Then click on \"Sign up Now\" on the right side of the page.     You will be asked to enter the access code listed below, as well as some personal information. Please follow the directions to create your username and password.     Your access code is: [email or web address]  Expires: 9/3/2018  9:35 AM     Your access code will  in 90 days. If you need help or a new code, please call your [facility] clinic or [phone].        Care EveryWhere ID     This is your Care EveryWhere ID. This could be used by other organizations to access your [facility] medical records  [id number]      "   Equal Access to Services     [facility]: Hadii aad ku hadjacquiealexis Hammonds, waaxda luqadaha, qaybta kaalmamyrtle monson, you argueta. So [facility] [phone].    ATENCIÓN: Si habla español, tiene a zeng disposición servicios gratuitos de asistencia lingüística. Selinaame al [phone].    We comply with applicable federal civil rights laws and [place] laws. We do not discriminate on the basis of race, color, national origin, age, disability, sex, sexual orientation, or gender identity.               Review of your medicines      START taking        Dose / Directions    cephALEXin 500 MG capsule   Commonly known as:  KEFLEX   Used for:  Acute cystitis without hematuria        Dose:  500 mg   Take 1 capsule (500 mg) by mouth 3 times daily for 5 days   Quantity:  15 capsule   Refills:  0         CONTINUE these medicines which have NOT CHANGED        Dose / Directions    ACETAMINOPHEN PO        Dose:  500 mg   Take 500 mg by mouth 3 times daily as needed for pain   Refills:  0       ATENOLOL PO        Dose:  50 mg   Take 50 mg by mouth 2 times daily Hold if SBP < 100 or HR < 60   Refills:  0       Cholecalciferol 5000 units Tabs        Dose:  1 capsule   Take 1 capsule by mouth every morning   Refills:  0       CITALOPRAM HYDROBROMIDE PO        Dose:  20 mg   Take 20 mg by mouth every morning   Refills:  0       Dextromethorphan-guaiFENesin  MG/5ML syrup        Dose:  10 mL   Take 10 mLs by mouth every 4 hours as needed for cough   Refills:  0       FUROSEMIDE PO        Dose:  40 mg   Take 40 mg by mouth 2 times daily Take 40 mg po in the morning and 40 mg po at noon.   Refills:  0       hydrocortisone 2.5 % cream   Commonly known as:  ANUSOL-HC        Dose:  1 applicator   Place 1 applicator rectally 2 times daily as needed for hemorrhoids   Refills:  0       LOSARTAN POTASSIUM PO        Dose:  100 mg   Take 100 mg by mouth every morning Hold if SBP <100   Refills:  0       POTASSIMIN PO         Dose:  20 mEq   Take 20 mEq by mouth every morning   Refills:  0       warfarin 2.5 MG tablet   Commonly known as:  COUMADIN        Dose:  2.5 mg   Take 2.5 mg by mouth daily   Refills:  0            Where to get your medicines      These medications were sent to [facility] [place], [place] - [address] [facility]  [address] [facility] [address]     Phone:  [phone]     cephALEXin 500 MG capsule                Protect others around you: Learn how to safely use, store and throw away your medicines at www.disposemymeds.org.        ANTIBIOTIC INSTRUCTION     You've Been Prescribed an Antibiotic - Now What?  Your healthcare team thinks that you or your loved one might have an infection. Some infections can be treated with antibiotics, which are powerful, life-saving drugs. Like all medications, antibiotics have side effects and should only be used when necessary. There are some important things you should know about your antibiotic treatment.      Your healthcare team may run tests before you start taking an antibiotic.    Your team may take samples (e.g., from your blood, urine or other areas) to run tests to look for bacteria. These test can be important to determine if you need an antibiotic at all and, if you do, which antibiotic will work best.      Within a few days, your healthcare team might change or even stop your antibiotic.    Your team may start you on an antibiotic while they are working to find out what is making you sick.    Your team might change your antibiotic because test results show that a different antibiotic would be better to treat your infection.    In some cases, once your team has more information, they learn that you do not need an antibiotic at all. They may find out that you don't have an infection, or that the antibiotic you're taking won't work against your infection. For example, an infection caused by a virus can't be treated with antibiotics.  Staying on an antibiotic when you don't need it is more likely to be harmful than helpful.      You may experience side effects from your antibiotic.    Like all medications, antibiotics have side effects. Some of these can be serious.    Let you healthcare team know if you have any known allergies when you are admitted to the hospital.    One significant side effect of nearly all antibiotics is the risk of severe and sometimes deadly diarrhea caused by Clostridium difficile (C. Difficile). This occurs when a person takes antibiotics because some good germs are destroyed. Antibiotic use allows C. diificile to take over, putting patients at high risk for this serious infection.    As a patient or caregiver, it is important to understand your or your loved one's antibiotic treatment. It is especially important for caregivers to speak up when patients can't speak for themselves. Here are some important questions to ask your healthcare team.    What infection is this antibiotic treating and how do you know I have that infection?    What side effects might occur from this antibiotic?    How long will I need to take this antibiotic?    Is it safe to take this antibiotic with other medications or supplements (e.g., vitamins) that I am taking?     Are there any special directions I need to know about taking this antibiotic? For example, should I take it with food?    How will I be monitored to know whether my infection is responding to the antibiotic?    What tests may help to make sure the right antibiotic is prescribed for me?      Information provided by:  www.cdc.gov/getsmart  U.S. Department of Health and Human Services  Centers for disease Control and Prevention  National Center for Emerging and Zoonotic Infectious Diseases  Division of Healthcare Quality Promotion             Medication List: This is a list of all your medications and when to take them. Check marks below indicate your daily home schedule. Keep this  list as a reference.      Medications           Morning Afternoon Evening Bedtime As Needed    ACETAMINOPHEN PO   Take 500 mg by mouth 3 times daily as needed for pain   Last time this was given:  500 mg on 6/4/2018  1:56 PM                                ATENOLOL PO   Take 50 mg by mouth 2 times daily Hold if SBP < 100 or HR < 60   Last time this was given:  50 mg on 6/3/2018  8:49 PM                                cephALEXin 500 MG capsule   Commonly known as:  KEFLEX   Take 1 capsule (500 mg) by mouth 3 times daily for 5 days                                Cholecalciferol 5000 units Tabs   Take 1 capsule by mouth every morning                                CITALOPRAM HYDROBROMIDE PO   Take 20 mg by mouth every morning   Last time this was given:  20 mg on 6/4/2018  9:05 AM                                Dextromethorphan-guaiFENesin  MG/5ML syrup   Take 10 mLs by mouth every 4 hours as needed for cough                                FUROSEMIDE PO   Take 40 mg by mouth 2 times daily Take 40 mg po in the morning and 40 mg po at noon.   Last time this was given:  40 mg on 6/4/2018  9:03 PM                                hydrocortisone 2.5 % cream   Commonly known as:  ANUSOL-HC   Place 1 applicator rectally 2 times daily as needed for hemorrhoids                                LOSARTAN POTASSIUM PO   Take 100 mg by mouth every morning Hold if SBP <100   Last time this was given:  100 mg on 6/4/2018  9:05 AM                                POTASSIMIN PO   Take 20 mEq by mouth every morning                                warfarin 2.5 MG tablet   Commonly known as:  COUMADIN   Take 2.5 mg by mouth daily   Last time this was given:  2.5 mg on 6/4/2018  5:26 PM

## 2018-06-01 NOTE — H&P
Admitted:     06/01/2018      CHIEF COMPLAINT:  Shortness of breath, lower extremity swelling, generalized weakness.      HISTORY OF PRESENT ILLNESS:  Yolanda Keen is an 88-year-old female with past medical history significant for tachybrady syndrome and atrial fibrillation status post pacemaker placement, congestive heart failure systolic and diastolic, hypertension, aortic stenosis, left ventricular hypertrophy who lives in a memory care unit, presented today with a chief complaint of shortness of breath, generalized weakness and lower extremity swelling.  The history is obtained from her daughter at bedside.  The patient had a chest x-ray done at the memory care unit and noticed worsening change of pulmonary edema.  Her Lasix dose was increased yesterday to 80 mg twice a day.  The patient has solitary kidney at baseline due to cancer and concern for renal failure at the same time, she continued to have shortness of breath and she was brought to the emergency room here.  The patient voiced no complaint and she was not sure why she is in the emergency room.  The patient retained more fluids and worsening shortness of breath and fluctuating blood pressure and heart rate.  In the emergency room, she was evaluated.  Electrolytes were within normal limits.  BNP was 2400.  Troponin was negative.  CBC was normal.  INR was 1.9.  Urinalysis showed positive nitrite and WBC was 6.  She was given a dose of Rocephin and 40 mg of IV Lasix and admitted to the hospital.      PAST MEDICAL HISTORY:    History of atrial fibrillation with tachybrady syndrome.  Status post permanent pacemaker.  Systolic and diastolic congestive heart failure.  Atrial fibrillation.  Aortic valve stenosis.  Solitary kidney.      PAST SURGICAL HISTORY:  Total knee arthroplasty, cholecystectomy, nephrectomy, hysterectomy.      SOCIAL HISTORY:  The patient in the emergency room with her daughter and granddaughter.  She does not drink alcohol.  She  does not use illicit drugs.  She does not smoke.  She lives in a memory care unit.      REVIEW OF SYSTEMS:  Ten points reviewed, all are negative except those mentioned in history of present illness.      HOME MEDICATIONS:  Reviewed and reconciled as in electronic medical record.      PHYSICAL EXAMINATION:   GENERAL:  The patient is awake, alert, oriented to self and person and partly to time, not in distress, off oxygen.   VITAL SIGNS:  Blood pressure 160/80, pulse rate 55, temperature 97.5, oxygen saturation 93%.   HEENT:  Pink, nonicteric.  Extraocular muscle movement intact.  Moist oral mucosa.   NECK:  Supple, no JVD, no thyromegaly.   CHEST:  Good air entry bilaterally.  No wheezing, crackles or rales.   CARDIOVASCULAR:  S1 and S2 heard, systolic murmur best heard at aortic area, 3/6.   ABDOMEN:  Soft, nontender, nondistended.  Old surgical scar noted.  Positive bowel sounds.   EXTREMITIES:  Plus 1 pretibial, +2 pedal and ankle edema both lower extremities.   NEUROLOGIC:  No focal neurologic deficit.  Moves all her extremities.  Positive for memory loss.      DIAGNOSTIC TESTS  OF INTEREST:  Sodium 142, potassium 4, BUN 21, creatinine 0.8, BNP 2475, troponin 0.015, glucose 97.  WBC 6.1, hemoglobin 42, platelets 171.  Urinalysis showed nitrate positive, WBC 6.  EKG done in the emergency room showed ventricular paced rhythm at 80 beats per minutes.      ASSESSMENT AND PLAN:  Yolanda Keen is an 88-year-old female with history of diastolic and systolic congestive heart failure, aortic stenosis, tachybrady with atrial fibrillation on anticoagulation who presented today with worsening shortness of breath, lower extremity swelling and also suspicious for urinary tract infection and being admitted to the hospital.  I discussed regarding this patient with Dr. Peña from the emergency room at length.   1.  Acute on chronic systolic and diastolic congestive heart failure exacerbation.   2.  Suspected urinary tract  infection.   3.  Dementia.   4.  Solitary kidney.   5.  Atrial fibrillation status post permanent pacemaker, on anticoagulation.  INR is 1.9.      PLAN:  The patient is being admitted as an inpatient.  Expect at least 2 nights stay in the hospital.  Get a dose of IV Lasix in the emergency room.  I will continue IV Lasix 40 mg for 2 doses and transition her to oral Lasix.  She got a dose of Rocephin in the emergency room and I will continue Rocephin daily pending culture results.  The patient will have echocardiogram.  We will monitor input and output, daily weights and monitor on telemetry.  She is not in respiratory distress.  She is saturating mid 90s on room air at this point.  Followup urine culture.  Coumadin will be dosed per pharmacy.  The patient needs afterload.  She has aortic valve stenosis and will monitor her hydration status closely and also creatinine needs to be monitored as she has a solitary kidney.  All are her daughter's questions and concerns addressed and they showed understanding.  The patient does not appear to have any questions.  In the event of cardiorespiratory arrest, she is do not intubate, do not resuscitate.         UH LAY MD             D: 2018   T: 2018   MT: MAHNAZ      Name:     DAREN SEWELL   MRN:      [id number]        Account:      [id number]   :      1930        Admitted:     2018                   Document: [id number]

## 2018-06-01 NOTE — IP AVS SNAPSHOT
[place] [address] [facility]    [address]    [facility] [address]    Phone:  [phone]    Fax:  [phone]                                       After Visit Summary   6/1/2018    Yolanda Keen    MRN: [id number]           After Visit Summary Signature Page     I have received my discharge instructions, and my questions have been answered. I have discussed any challenges I see with this plan with the nurse or doctor.    ..........................................................................................................................................  Patient/Patient Representative Signature      ..........................................................................................................................................  Patient Representative Print Name and Relationship to Patient    ..................................................               ................................................  Date                                            Time    ..........................................................................................................................................  Reviewed by Signature/Title    ...................................................              ..............................................  Date                                                            Time

## 2018-06-01 NOTE — LETTER
Transition Communication Hand-off for Care Transitions to Next Level of Care Provider    Name: Yolanda Keen  : 1930  MRN #: [id number]  Primary Care Provider: Lupillo Brasher  Primary Care MD Name: Stacykell  Primary Clinic: [address]  [facility] [address]  Primary Care Clinic Name:  Geriatrics  Reason for Hospitalization:  Shortness of breath [R06.02]  Elevated brain natriuretic peptide (BNP) level [R79.89]  Acute cystitis without hematuria [N30.00]  Admit Date/Time: 2018 11:38 AM  Discharge Date:   Payor Source: Payor: [employer] / Plan: [employer] PLAN / Product Type: HMO /     Readmission Assessment Measure (CAMERON) Risk Score/category: Avg    Reason for Communication Hand-off Referral: Admission diagnoses: CHF    Discharge Plan: return to  with PT/OKT      Discharge Needs Assessment:  Needs       Most Recent Value    Transportation Available family or friend will provide        Follow-up plan:  No future appointments.    Any outstanding tests or procedures:        Referrals     Future Labs/Procedures    Home Care OT Referral for Hospital Discharge     Comments:    OT to eval and treat    Your provider has ordered home care - occupational therapy. If you have not been contacted within 2 days of your discharge please call the department phone number listed on the top of this document.    Home Care PT Referral for Hospital Discharge     Comments:    PT to eval and treat    Your provider has ordered home care - physical therapy. If you have not been contacted within 2 days of your discharge please call the department phone number listed on the top of this document.          Key Recommendations: pt admitted with CHF/UTI. Pt received IV diuretics and IV antibiotics while here in the hospital. Pt should be following a low sodium diet but specialty diets are not offered at her memory care facility. Currently she was on daily weights until  at [facility] but  staff stated  that order would end 6/4. Changes at discharge were: pt is to take keflex tid x5 days. She dc'd back to [facility] with new Home PT/OT.    Amber Grewal/Miley Morris    AVS/Discharge Summary is the source of truth; this is a helpful guide for improved communication of patient story

## 2018-06-01 NOTE — PHARMACY-ADMISSION MEDICATION HISTORY
Admission medication history interview status for this patient is complete. See [facility] admission navigator for allergy information, prior to admission medications and immunization status.     Medication history interview source(s):[facility]  Medication history resources (including written lists, pill bottles, clinic record):[facility]  Primary pharmacy:unknown    Changes made to PTA medication list:  Added: warfarin   Deleted: none  Changed: fursomide dose updated    Actions taken by pharmacist (provider contacted, etc):None     Additional medication history information:None    Medication reconciliation/reorder completed by provider prior to medication history? No    For patients on insulin therapy: no (Yes/No)   Lantus/levemir/NPH/Mix 70/30 dose: _____ in AM/PM or twice daily   Sliding scale Novolog Y/N   If Yes, do you have a baseline novolog pre-meal dose: ______units with meals   Patients eat three meals a day: Y/N   Any Barriers to therapy: cost of medications/comfortable with giving injections (if applicable)/ comfortable and confident with current diabetes regimen       Prior to Admission medications    Medication Sig Last Dose Taking? Auth Provider   ATENOLOL PO Take 50 mg by mouth 2 times daily Hold if SBP < 100 or HR < 60 6/1/2018 at x1 Yes Reported, Patient   Cholecalciferol 5000 UNITS TABS Take 1 capsule by mouth every morning 6/1/2018 at Unknown time Yes Reported, Patient   CITALOPRAM HYDROBROMIDE PO Take 20 mg by mouth every morning 6/1/2018 at Unknown time Yes Reported, Patient   FUROSEMIDE PO Take 40 mg by mouth 2 times daily Take 40 mg po in the morning and 40 mg po at noon. 6/1/2018 at x 2 Yes Reported, Patient   LOSARTAN POTASSIUM PO Take 100 mg by mouth every morning Hold if SBP <100 6/1/2018 at Unknown time Yes Reported, Patient   Potassium (POTASSIMIN PO) Take 20 mEq by mouth every morning 6/1/2018 at Unknown time Yes Reported, Patient   warfarin (COUMADIN) 2.5 MG tablet Take 2.5 mg by mouth daily  5/31/2018 at Unknown time Yes Unknown, Entered By History   ACETAMINOPHEN PO Take 500 mg by mouth 3 times daily as needed for pain Unknown  Reported, Patient   Dextromethorphan-guaiFENesin  MG/5ML syrup Take 10 mLs by mouth every 4 hours as needed for cough Unknown at Unknown time  Reported, Patient   hydrocortisone (ANUSOL-HC) 2.5 % cream Place 1 applicator rectally 2 times daily as needed for hemorrhoids Unknown at Unknown time  Reported, Patient

## 2018-06-01 NOTE — PHARMACY-ANTICOAGULATION SERVICE
Clinical Pharmacy - Warfarin Dosing Consult     Pharmacy has been consulted to manage this patient s warfarin therapy.  Indication: Atrial Fibrillation  Therapy Goal: INR 2-3  Warfarin Prior to Admission: Yes  Warfarin PTA Regimen:  (2.5mg daily)    INR   Date Value Ref Range Status   06/01/2018 1.97 (H) 0.86 - 1.14 Final   05/21/2018 2.16 (H) 0.86 - 1.14 Final       Recommend warfarin 2.5 mg today.  Pharmacy will monitor Yolanda Keen daily and order warfarin doses to achieve specified goal.      Please contact pharmacy as soon as possible if the warfarin needs to be held for a procedure or if the warfarin goals change.

## 2018-06-02 NOTE — PLAN OF CARE
Problem: Cardiac: Heart Failure (Adult)  Goal: Signs and Symptoms of Listed Potential Problems Will be Absent, Minimized or Managed (Cardiac: Heart Failure)  Signs and symptoms of listed potential problems will be absent, minimized or managed by discharge/transition of care (reference Cardiac: Heart Failure (Adult) CPG).   Outcome: No Change  VS stable. Diminished LS. No complaints of pain. +2 edema to BLE. Tele is 100% V paced with BBB. Alert x 1. Slept well throughout the night.

## 2018-06-02 NOTE — PLAN OF CARE
Problem: Patient Care Overview  Goal: Plan of Care/Patient Progress Review  Outcome: No Change  Patient admitted to unit this evening shift. Transferred with assist of 2 and T-belt. VSS afebrile. Tele V paced with BBB HR 55 -70. Jackson cath with 1000cc out put. Good po appetite. Lungs decreased with crackles though out. 2+ lower ext edema. Delirium prevention education completed with family. Red coccyx. Up in chair with foam cushion.

## 2018-06-02 NOTE — PROGRESS NOTES
[facility]  Hospitalist Progress Note  Jj Bartholomew MD, MD 06/02/2018  (Text Page)  Reason for Stay (Diagnosis): Shortness of breath, pulmonary edema secondary to CHF in exacerbation         Assessment and Plan:      Summary of Stay: Yolanda Keen is an 88-year-old female with history of diastolic and systolic congestive heart failure, aortic stenosis, tachybrady with atrial fibrillation on anticoagulation who presented earlier with worsening shortness of breath, lower extremity swelling and also suspicious for urinary tract infection and being admitted to the hospital.       1.  Acute on chronic systolic and diastolic congestive heart failure exacerbation.   2.  Suspected urinary tract infection.   3.  Dementia.   4.  Solitary kidney.   5.  Atrial fibrillation status post permanent pacemaker, on anticoagulation.  INR is 1.9.     Continue inpatient care.  Remain on IV diuretics for today.  Resumption of her home oral Lasix that was recently increased at 40 twice daily starting tomorrow.  Continue to monitor while on oral Lasix.  Strict I's and O's.  Daily weights.  Monitor electrolytes as well and creatinine given prior history of solitary kidney while on diuretics.  Started on Rocephin earlier for presumptive UTI.  I requested a urine culture today.  Warfarin to continue as per pharmacy protocol for her history of atrial fibrillation.  Patient has a prior history of aortic stenosis and needs to continue to monitor her volume status while on IV Lasix.  Current working diagnosis, plan of care, expected hospitalization days were discussed in detail with Beth, 2 other family members in attendance at bedside and they are in agreement with this.  There were given the opportunity to ask questions and I have answered at the best of my ability.    DVT Prophylaxis: Warfarin  Code Status: DNR / DNI  Discharge Dispo: Anticipating home  Estimated Disch Date / # of Days until Disch: At least 2 more  "days        Interval History (Subjective):      Assume care today.  Seen and examined.  Chart reviewed.  Multiple family members in attendance at bedside.  Beth had a uneventful night.  She is not requiring any oxygen supplementation.  She is denying any ongoing shortness of breath, chest pain, nausea, vomiting, abdominal pain.  Her hemodynamics remained stable.  Currently afebrile.  Good amount of urine output seen on her Jackson catheter.  Mental status remained the same with no reported combativeness, agitation.     # Pain Assessment:  Current Pain Score 6/2/2018   Patient currently in pain? claudine oswald pain level was assessed and she currently denies pain.                  Physical Exam:      Last Vital Signs:  /68 (BP Location: Left arm)  Pulse 52  Temp 97.5  F (36.4  C) (Oral)  Resp 18  Ht 1.727 m (5' 8\")  Wt 92.9 kg (204 lb 12.8 oz)  SpO2 92%  BMI 31.14 kg/m2    I/O last 3 completed shifts:  In: 240 [P.O.:240]  Out: 2950 [Urine:2950]  Wt Readings from Last 1 Encounters:   06/02/18 92.9 kg (204 lb 12.8 oz)     Vitals:    06/01/18 1138 06/01/18 1541 06/02/18 0658 06/02/18 0711   Weight: 93.9 kg (207 lb) 94.8 kg (209 lb) 94 kg (207 lb 3.2 oz) 92.9 kg (204 lb 12.8 oz)       Constitutional: Awake, alert, cooperative, no apparent distress   Respiratory: Clear to auscultation bilaterally, no crackles or wheezing   Cardiovascular: Regular rate and rhythm, normal S1 and S2, and no murmur noted   Abdomen: Normal bowel sounds, soft, non-distended, non-tender   Skin: No rashes, no cyanosis, dry to touch   Neuro: Alert and oriented x1, no weakness, spontaneous and coherent speech   Extremities:  Pitting edema on both lower extremities with GELY hose, normal range of motion   Other(s): Euthymic mood, not agitated       All other systems: Negative          Medications:      All current medications were reviewed with changes reflected in problem list.         Data:      All new lab and imaging data was " reviewed.   Labs:  No results for input(s): CULT in the last 168 hours.    Recent Labs  Lab 06/02/18  0742 06/01/18  1157 05/31/18    142 140   POTASSIUM 3.8 4.0 4.0   CHLORIDE 103 105 102   CO2 32 35* 30   ANIONGAP 7 2* 8   GLC 89 97 82   BUN 23 21 19   CR 0.78 0.84 0.72   GFRESTIMATED 70 64 76   GFRESTBLACK 85 77 >90   ERIN 9.1 9.5 9.4       Recent Labs  Lab 06/01/18  1157 05/31/18   WBC 6.1 8.0   HGB 12.6 12.5   HCT 42.4 41.4   MCV 87 85    172       Recent Labs  Lab 06/02/18  0742 06/01/18  1157 05/31/18   GLC 89 97 82       Recent Labs  Lab 06/02/18  0742 06/01/18  1157   INR 1.91* 1.97*       Recent Labs  Lab 06/01/18  1157   TROPI <0.015     No results for input(s): TSH in the last 168 hours.    Recent Labs  Lab 06/01/18  1210   COLOR Yellow   APPEARANCE Slightly Cloudy   URINEGLC Negative   URINEBILI Negative   URINEKETONE Negative   SG 1.008   UBLD Small*   URINEPH 6.0   PROTEIN Negative   NITRITE Positive*   LEUKEST Small*   RBCU 1   WBCU 6*      Imaging:   Results for orders placed or performed during the hospital encounter of 06/01/18   XR Chest 2 Views    Narrative    XR CHEST 2 VW 6/1/2018 12:25 PM    COMPARISON: None.    HISTORY: Dyspnea.      Impression    IMPRESSION: Enlarged cardiac silhouette. Likely mild pulmonary edema.  Single-lead implanted cardiac pacer over the left chest. No pleural  effusion or pneumothorax.    TANNER HARTLEY MD

## 2018-06-02 NOTE — CONSULTS
Care Transition Initial Assessment - RN    Reason For Consult: care coordination/care conference, discharge planning   Met with: Caregiver, Beronica at [facility] over the phone.  DATA   Active Problems:    CHF (congestive heart failure) (H)       Cognitive Status: awake, alert and confused.  [facility] Care [facility]  Primary Care MD Name: Cubaskkell  Contact information and PCP information verified: Yes  Lives With: facility resident  Living Arrangements:  ([facility])  Quality Of Family Relationships: supportive, involved  Description of Support System: Supportive, Involved   Who is your support system?: Children   Support Assessment: Adequate family and caregiver support   Insurance concerns: No Insurance issues identified  ASSESSMENT  Patient currently receives the following services:  Full services at her [facility] facility-all cares and medications        Identified issues/concerns regarding health management: Pt admitted with CHF and UTI. Pt lives in a memory care facility. CM spoke with Beronica at [facility], who states the pt at baseline has no behavioral issues but obviously issues with memory. She follows a regular diet, unfortunately there is not any specialty diets available to any of the residents, such as a low sodium diet but Beronica states the staff is mindful and doesn't add salt to meals. Currently pt gets daily weights every morning at 8am but this is only ordered through 6/4/18. Pt uses a walker at all times at baseline for mobility. Beronica states pt has really good family support and she does not have any concerns that are not already being addressed. Pt is rounded on by [facility] at her [facility] facility as needed, they are available twice a week.       New scripts at discharged do NOT need to be filled but hard copies need to be sent with patient upon return. Please fax dc orders to [phone]    PLAN  Patient anticipates discharging to  back to memory care [facility] .        Patient anticipates needs for home equipment: No  Plan/Disposition: LTC   Appointments: Pt will be seen by  Geriatrics upon return to her facility either Tuesdays or Thursdays.      Care  (CTS) will continue to follow as needed.    Amber Glasgow RN, BSN CTS  Care Coordinator  [phone]

## 2018-06-03 NOTE — PLAN OF CARE
Problem: Patient Care Overview  Goal: Plan of Care/Patient Progress Review  Outcome: No Change  VS stable. Oxygen saturation hovers about 92-93%. A & O x 2, disoriented to time and situation.   Tele: afib with occasional V pacing.   Diet:2 gram sodium  Ambulates: with 1 and a walker.   Pain:denies.   Abnormal assessments: +2-3 edema in bilateral legs. Nate hose socks on.   Drains/devices :díaz catheter in place   Discharge plan: about 2 more days. Cardiology following. Will continue to monitor and review plan of care.     Heart Failure Care Pathway  GOALS TO BE MET BEFORE DISCHARGE:    1. Decrease congestion and/or edema with diuretic therapy to achieve near      optimal volume status.            Dyspnea improved:  NA            Edema improved:     No, please explain: still +2-3 edema in bilateral legs        Net I/O and Weights since admission:          05/04 0700 - 06/03 0659  In: 980 [P.O.:980]  Out: 4350 [Urine:4350]  Net: -3370            Vitals:    06/01/18 1138 06/01/18 1541 06/02/18 0658 06/02/18 0711   Weight: 93.9 kg (207 lb) 94.8 kg (209 lb) 94 kg (207 lb 3.2 oz) 92.9 kg (204 lb 12.8 oz)       2.  O2 sats > 92% on RA or at prior home O2 therapy level.          Current oxygenation status:       SpO2: 94 %         O2 Device: None (Room air),            Able to wean O2 this shift to keep sats > 92%:  NA       Does patient use Home O2? No    3.  Tolerates ambulation and mobility near baseline: Yes        How many times did the patient ambulate with nursing staff this shift? 1- transferring to and from. Family also assisted the patient with transferring and ambulation.     Please review the Heart Failure Care Pathway for additional HF goal outcomes.    Marilia Jarquin RN  6/2/2018

## 2018-06-03 NOTE — PROGRESS NOTES
"Note for patient relations.  Patient found during change of shift report sitting at the edge of bed with feet flat on the floor. Daughter and granddaughter sound asleep in recliners in the room when both on going and off coming RN's in the room for bedside report.Call light was on when we entered the room. Patient calm cooperative and just looking to get up for the day. Stated she has\"lots of children to get off to school\".Patient had somehow managed to put on call light either by bumping the side rail or pushing the callight which was on night stand where family placed it. Staff was not concerned or upset in any way. Bed side report was paused and RN'S proceeded to get patient up in a chair for the morning. At this time Daughter and Granddaughter woke. 2 hours later  Daughter and granddaughter stated to nursing assist that they feel staff is lying to them. They do not believe the 2 nurses at patients bedside that patient was sitting at side of bed. ( note when they woke she was sitting up right at edge of bed wide awake talking with nurses)They do not believe that patient would try to get out of bed on her own. Night shift weighed patient in bed and family stated that they did not believe this to be true. Writer sat down with daughter and granddaughter to discuss their concerns with them. Attempted to answer their questions on why a bed weight was obtained. Since they were all sleeping, we did not want to wake them. Patient was weighed again after breakfast using standing scale and weigh was 7oz higher then earlier morning bed weight. Attempted to reassure family that staff was caring for patient with up most integrity. They appeared to be somewhat less concerned. Due to the serious nature of family believing they are being lied to . Referral made to patient relations.      "

## 2018-06-03 NOTE — PROGRESS NOTES
[facility]  Hospitalist Progress Note  Jj Bartholomew MD, MD 06/03/2018  (Text Page)  Reason for Stay (Diagnosis): Shortness of breath, pulmonary edema secondary to CHF in exacerbation         Assessment and Plan:      Summary of Stay: Yolanda Keen is an 88-year-old female with history of diastolic and systolic congestive heart failure, aortic stenosis, tachybrady with atrial fibrillation on anticoagulation who presented earlier with worsening shortness of breath, lower extremity swelling and also suspicious for urinary tract infection and being admitted to the hospital.       1.  Acute on chronic systolic and diastolic congestive heart failure exacerbation.   2.  urinary tract infection with isolated E coli pending sensitivity  3.  Dementia.   4.  Solitary kidney.   5.  Atrial fibrillation status post permanent pacemaker, on anticoagulation.   6. Severe AS with severe LVH    Continue inpatient care.  Now on oral lasix 40 mg BID.  We appreciate input from cardiology service of whom I requested formal evaluation given this findings of severe aortic stenosis.  Family will be discussing among themselves regarding if they will be pursuing further treatment with this aortic stenosis or continue with conservative measures and care.  I have explained to them and also by cardiology regarding this diagnosis, plan of care, risk and prognosis.  Patient has history of severe dementia that likely be a limiting factor towards further treatment of the severe aortic stenosis.  Palliative care involvement if patient and family will proceed only with conservative measures regarding this aortic stenosis.  Continue on Rocephin.  Pulmonary results of culture showing E. coli with pending sensitivities.   Warfarin to continue as per pharmacy protocol for her history of atrial fibrillation.  Current working diagnosis, plan of care, expected hospitalization days were discussed in detail with Beth, 2 other family  "members in attendance at bedside and they are in agreement with this.  There were given the opportunity to ask questions and I have answered at the best of my ability.    DVT Prophylaxis: Warfarin  Code Status: DNR / DNI  Discharge Dispo: Anticipating home  Estimated Disch Date / # of Days until Disch: 24-36 hours        Interval History (Subjective):      Continuing care today.  Seen and examined.  Chart reviewed.  Multiple family members in attendance at bedside.    She is not requiring any oxygen supplementation.  She is denying any ongoing shortness of breath, chest pain, nausea, vomiting, abdominal pain.  Her hemodynamics remained stable.  Currently afebrile.  Good amount of urine output seen on her Jackson catheter.  Mental status remained the same with no reported combativeness, agitation. Tolerating oral diet     # Pain Assessment:  Current Pain Score 6/3/2018   Patient currently in pain? -   Pain score (0-10) 0   Yolanda oswald pain level was assessed and she currently denies pain.                  Physical Exam:      Last Vital Signs:  /59 (BP Location: Left arm)  Pulse 55  Temp 98.2  F (36.8  C) (Oral)  Resp 19  Ht 1.727 m (5' 8\")  Wt 93.4 kg (205 lb 12.8 oz)  SpO2 92%  BMI 31.29 kg/m2    I/O last 3 completed shifts:  In: 740 [P.O.:740]  Out: 1750 [Urine:1750]  Wt Readings from Last 1 Encounters:   06/03/18 93.4 kg (205 lb 12.8 oz)     Vitals:    06/01/18 1541 06/02/18 0658 06/02/18 0711 06/03/18 0656   Weight: 94.8 kg (209 lb) 94 kg (207 lb 3.2 oz) 92.9 kg (204 lb 12.8 oz) 93.2 kg (205 lb 7.5 oz)    06/03/18 0900   Weight: 93.4 kg (205 lb 12.8 oz)       Constitutional: Awake, alert, cooperative, no apparent distress   Respiratory: Clear to auscultation bilaterally, no crackles or wheezing   Cardiovascular: Regular rate and rhythm, normal S1 and S2, and no murmur noted   Abdomen: Normal bowel sounds, soft, non-distended, non-tender   Skin: No rashes, no cyanosis, dry to touch   Neuro: Alert and " oriented x1, no weakness, spontaneous and coherent speech   Extremities:  Pitting edema on both lower extremities with GELY hose, normal range of motion   Other(s): Euthymic mood, not agitated       All other systems: Negative          Medications:      All current medications were reviewed with changes reflected in problem list.         Data:      All new lab and imaging data was reviewed.   Labs:    Recent Labs  Lab 06/01/18  1210   CULT >100,000 colonies/mLEscherichia coliSusceptibility testing in progress*  Culture in progress       Recent Labs  Lab 06/03/18  0619 06/02/18  0742 06/01/18  1157    142 142   POTASSIUM 4.0 3.8 4.0   CHLORIDE 102 103 105   CO2 33* 32 35*   ANIONGAP 4 7 2*   GLC 90 89 97   BUN 24 23 21   CR 0.78 0.78 0.84   GFRESTIMATED 70 70 64   GFRESTBLACK 84 85 77   ERIN 9.4 9.1 9.5       Recent Labs  Lab 06/01/18  1157 05/31/18   WBC 6.1 8.0   HGB 12.6 12.5   HCT 42.4 41.4   MCV 87 85    172       Recent Labs  Lab 06/03/18  0619 06/02/18  0742 06/01/18  1157 05/31/18   GLC 90 89 97 82       Recent Labs  Lab 06/03/18  0619 06/02/18  0742 06/01/18  1157   INR 2.01* 1.91* 1.97*       Recent Labs  Lab 06/01/18  1157   TROPI <0.015     No results for input(s): TSH in the last 168 hours.    Recent Labs  Lab 06/01/18  1210   COLOR Yellow   APPEARANCE Slightly Cloudy   URINEGLC Negative   URINEBILI Negative   URINEKETONE Negative   SG 1.008   UBLD Small*   URINEPH 6.0   PROTEIN Negative   NITRITE Positive*   LEUKEST Small*   RBCU 1   WBCU 6*      Imaging:   Results for orders placed or performed during the hospital encounter of 06/01/18   XR Chest 2 Views    Narrative    XR CHEST 2 VW 6/1/2018 12:25 PM    COMPARISON: None.    HISTORY: Dyspnea.      Impression    IMPRESSION: Enlarged cardiac silhouette. Likely mild pulmonary edema.  Single-lead implanted cardiac pacer over the left chest. No pleural  effusion or pneumothorax.    TANNER HARTLEY MD

## 2018-06-03 NOTE — CONSULTS
Consult Date:  06/03/2018      CARDIOLOGY CONSULTATION       REQUESTING PROVIDER:  Jj Bartholomew MD      REASON FOR CONSULTATION:  Severe aortic stenosis, congestive heart failure.      HISTORY OF PRESENT ILLNESS:  Ms. Keen is a pleasant, 88-year-old female with history of significant dementia, currently living in a memory care unit, history of tachybrady syndrome, status post permanent pacemaker implantation, atrial fibrillation on Coumadin, solitary kidney due to history of cancer, hypertension, who was admitted 2 days ago from memory care unit because of generalized weakness, shortness of breath and lower extremity swelling.  Cardiology is now consulted because of an echocardiogram showing severe aortic stenosis.  Echocardiogram done yesterday was personally reviewed by me.  It showed normal LV function, severe LVH, severe aortic stenosis with mean gradient of 27 mmHg, peak aortic valve velocity of 3.9 meters per second and aortic valve area of 0.9 cm2 but continued decreasing.  Aortic valve area index of 0.4 cm sq2 and dimensional index of 0.24.  To be noted, the patient had an echocardiogram report from 02/2017 done in [facility] that showed severe aortic stenosis with mean gradient of 28 mmHg, normal LV function and severe LVH.  It looks like patient has followed a cardiologist at [place].  Over the last several weeks intermittently patient has been noted to have shortness of breath, orthopnea and leg swelling.  She was eventually brought to the hospital 2 days ago, was diagnosed with congestive heart failure, underwent IV diuresis, and now is feeling much better.  She is now able to lie down with 2 pillows.  In the nursing home she was not able to decline and had to sit up because of shortness of breath.  No chest discomfort.  At the time of this review, patient appears quite comfortable, finishing her breakfast.  She denies feeling any shortness of breath or chest discomfort.  To be noted, she is  only oriented to her name.  She does not know the name of the hospital, city, year.  According to her daughter and granddaughter, her dementia is quite significant.  She is DNR/DNI.      REVIEW OF SYSTEMS:  A complete review of systems was performed and was negative except as in the HPI.      PAST MEDICAL HISTORY:  History of atrial fibrillation, tachybrady syndrome, status post pacemaker implantation.  She has history of CHF in the past.  She was noted to have a low flow, low gradient severe aortic stenosis about 1 year ago on echocardiogram.  She follows a cardiologist at [place], and has a solitary kidney.      ALLERGIES:  LISINOPRIL, MORPHINE.      SOCIAL HISTORY:  She lives in a memory care unit, which is an assisted living/nursing home.  No tobacco abuse.      FAMILY HISTORY:  Reviewed and noncontributory.      MEDICATIONS:   1.  Lasix 40 mg IV b.i.d., which has been completed, now 40 mg daily.   2.  Losartan 100 mg daily.   3.  Atenolol 50 mg b.i.d.   4.  Ceftriaxone (she was also diagnosed with UTI this admission).   5.  Coumadin.      PHYSICAL EXAMINATION:   VITAL SIGNS:  Blood pressure 145/59, heart rate 55, 92% on room air.   GENERAL:  The patient appears pleasant, comfortable.   NECK:  Normal JVP.   CARDIOVASCULAR:  There is grade 3/6 ejection systolic murmur with late peaking, muffled S2.  No radiation.  No S3, S4, rub or gallop.   RESPIRATORY:  Clear to auscultation bilaterally.   GASTROINTESTINAL SYSTEM:  Abdomen soft, nontender.   EXTREMITIES:  Minimal pitting pedal edema (her granddaughter actually showed me a photograph of her legs prior to her admission and there was significant pedal edema noted).   PSYCHIATRIC:  Normal affect.   SKIN:  No obvious rash.   HEENT:  No pallor or icterus.   NEUROLOGIC:  She is alert but oriented only x 1 as noted above.      PERTINENT LABORATORY:  Echocardiogram as noted above, images personally reviewed by me.  NT proBNP 2004 and 74, troponin less than 0.015.  BMP  shows bicarbonate of 33, otherwise normal.  CBC shows normal hemoglobin, platelets and WBC.  INR 2.01.  Chest x-ray shows slight interstitial increased markings.      ASSESSMENT AND PLAN:  A pleasant 88-year-old female with significant dementia, frail status, tachybrady syndrome, status post pacemaker implantation, atrial fibrillation on Coumadin, solitary kidney, who is now admitted with symptoms of shortness of breath, orthopnea, pedal edema consistent with congestive heart failure.  She is also noted to have severe aortic stenosis.  To be noted, aortic stenosis was moderate to severe last year as noted above.  I did have a long discussion with the patient and her family regarding the findings of her echocardiogram and the likely reason for congestive heart failure is severe aortic stenosis.  I discussed the natural history of aortic stenosis.  We discussed option of aortic valve replacement with TAVR.  However, given significant dementia, we also explored option of conservative management.  The patient's daughter and granddaughter tell me that they are mostly leaning towards conservative management; however, they want to talk with the rest of the family.  In case they opt for conservative management, I strongly recommend palliative team involvement.  I agree with switching to oral diuretics.  We can switch to 40 mg b.i.d. of Lasix.  We discussed the natural history of aortic stenosis with about 50% mortality in 1 year and also risk of sudden cardiac death.      1.  Acute decompensated congestive heart failure with preserved ejection fraction.  Etiology, severe aortic stenosis.   2.  Severe aortic stenosis as noted above.   3.  Severe left ventricular hypertrophy (LVH), likely because of the severe aortic stenosis.   4.  Solitary kidney.  Creatinine so far is stable.   5.  Atrial fibrillation on Coumadin.   6.  Tachybrady syndrome, status post permanent pacemaker implantation.      RECOMMENDATIONS:   1.  Agree  with switching to oral diuretics.   2.  The patient already has a cardiologist that they follow at [place].  As noted above, we discussed at length about option of conservative management versus TAVR.  The family is leaning towards conservative management, which I think is quite reasonable given that she has significant dementia.  If the focus is on comfort and conservative management, I strongly recommend involving palliative team.      I also recommend the patient can follow with their primary cardiologist.  However, the family tells me that they may be shifting care to [facility].  In the case they want to have an opinion from TAVR clinic, please let us know and he will be happy to arrange a review in our outpatient TAVR clinic.      Thank you for involving me in the care of Ms. Keen.         DAVE VILLA MD             D: 2018   T: 2018   MT: KENNETH      Name:     DAREN KEEN   MRN:      [id number]        Account:       [id number]   :      1930           Consult Date:  2018      Document: [id number]       cc: Lupillo LAWTON, CNP

## 2018-06-03 NOTE — PLAN OF CARE
Problem: Cardiac: Heart Failure (Adult)  Goal: Signs and Symptoms of Listed Potential Problems Will be Absent, Minimized or Managed (Cardiac: Heart Failure)  Signs and symptoms of listed potential problems will be absent, minimized or managed by discharge/transition of care (reference Cardiac: Heart Failure (Adult) CPG).   Outcome: No Change  VSS, pt up A1 in room.  Pt agitated per family at start of shift.  Pt denies pain, nausea.  Pt sleeping between cares.  Continue POC.    Heart Failure Care Pathway  GOALS TO BE MET BEFORE DISCHARGE:    1. Decrease congestion and/or edema with diuretic therapy to achieve near      optimal volume status.            Dyspnea improved:  Yes            Edema improved:     Yes        Net I/O and Weights since admission:          05/04 0700 - 06/03 0659  In: 980 [P.O.:980]  Out: 4350 [Urine:4350]  Net: -3370            Vitals:    06/01/18 1138 06/01/18 1541 06/02/18 0658 06/02/18 0711   Weight: 93.9 kg (207 lb) 94.8 kg (209 lb) 94 kg (207 lb 3.2 oz) 92.9 kg (204 lb 12.8 oz)       2.  O2 sats > 92% on RA or at prior home O2 therapy level.          Current oxygenation status:       SpO2: 97 %         O2 Device: Nasal cannula,  Oxygen Delivery: 2 LPM         Able to wean O2 this shift to keep sats > 92%:  No, please explain: Pt requiring 2L overnight       Does patient use Home O2? No    3.  Tolerates ambulation and mobility near baseline: Yes        How many times did the patient ambulate with nursing staff this shift? 0    Please review the Heart Failure Care Pathway for additional HF goal outcomes.    Nicolás Banks RN  6/3/2018

## 2018-06-03 NOTE — PLAN OF CARE
Problem: Cardiac: Heart Failure (Adult)  Goal: Signs and Symptoms of Listed Potential Problems Will be Absent, Minimized or Managed (Cardiac: Heart Failure)  Signs and symptoms of listed potential problems will be absent, minimized or managed by discharge/transition of care (reference Cardiac: Heart Failure (Adult) CPG).   Outcome: Improving  Heart Failure Care Pathway  GOALS TO BE MET BEFORE DISCHARGE:    1. Decrease congestion and/or edema with diuretic therapy to achieve near      optimal volume status.            Dyspnea improved:  Yes            Edema improved:     Yes        Net I/O and Weights since admission:          05/04 2300 - 06/03 2259  In: 980 [P.O.:980]  Out: 4700 [Urine:4700]  Net: -3720            Vitals:    06/01/18 1138 06/01/18 1541 06/02/18 0658 06/02/18 0711   Weight: 93.9 kg (207 lb) 94.8 kg (209 lb) 94 kg (207 lb 3.2 oz) 92.9 kg (204 lb 12.8 oz)    06/03/18 0656 06/03/18 0900   Weight: 93.2 kg (205 lb 7.5 oz) 93.4 kg (205 lb 12.8 oz)       2.  O2 sats > 92% on RA or at prior home O2 therapy level.          Current oxygenation status:       SpO2: 92 %         O2 Device: None (Room air),  Oxygen Delivery: 2 LPM         Able to wean O2 this shift to keep sats > 92%:  Yes       Does patient use Home O2? No    3.  Tolerates ambulation and mobility near baseline: No, Pt mobility changes through out the day. No consistency.        How many times did the patient ambulate with nursing staff this shift? 1    Please review the Heart Failure Care Pathway for additional HF go    Patient improved. Up in chair for breakfast and lunch. Instructed on use of incentive spirometer. Able to get to 1000. Lungs decreased with crackles in bases. VSS Atenolol held for heart rate of 55-58. Tele 100 % V paced.

## 2018-06-04 NOTE — PLAN OF CARE
Problem: Cardiac: Heart Failure (Adult)  Goal: Signs and Symptoms of Listed Potential Problems Will be Absent, Minimized or Managed (Cardiac: Heart Failure)  Signs and symptoms of listed potential problems will be absent, minimized or managed by discharge/transition of care (reference Cardiac: Heart Failure (Adult) CPG).   Heart Failure Care Pathway  GOALS TO BE MET BEFORE DISCHARGE:    1. Decrease congestion and/or edema with diuretic therapy to achieve near      optimal volume status.            Dyspnea improved:  Yes            Edema improved:     Yes        Net I/O and Weights since admission:          05/05 0700 - 06/04 0659  In: 1220 [P.O.:1220]  Out: 5475 [Urine:5475]  Net: -4255            Vitals:    06/01/18 1138 06/01/18 1541 06/02/18 0658 06/02/18 0711   Weight: 93.9 kg (207 lb) 94.8 kg (209 lb) 94 kg (207 lb 3.2 oz) 92.9 kg (204 lb 12.8 oz)    06/03/18 0656 06/03/18 0900   Weight: 93.2 kg (205 lb 7.5 oz) 93.4 kg (205 lb 12.8 oz)       2.  O2 sats > 92% on RA or at prior home O2 therapy level.          Current oxygenation status:       SpO2: 92 %  Yes       O2 Device: None (Room air),            Able to wean O2 this shift to keep sats > 92%: 92% on room air         Does patient use Home O2? No    3.  Tolerates ambulation and mobility near baseline: Yes        How many times did the patient ambulate with nursing staff this shift? 0    Please review the Heart Failure Care Pathway for additional HF goal outcomes.    Raven Villareal RN  6/4/2018

## 2018-06-04 NOTE — PLAN OF CARE
"Problem: Patient Care Overview  Goal: Plan of Care/Patient Progress Review  Outcome: No Change  Heart Failure Care Pathway  GOALS TO BE MET BEFORE DISCHARGE:    1. Decrease congestion and/or edema with diuretic therapy to achieve near      optimal volume status.            Dyspnea improved:  Yes            Edema improved:     Yes        Net I/O and Weights since admission:          05/05 2300 - 06/04 2259  In: 1560 [P.O.:1460; I.V.:100]  Out: 6375 [Urine:6375]  Net: -4815            Vitals:    06/01/18 1138 06/01/18 1541 06/02/18 0658 06/02/18 0711   Weight: 93.9 kg (207 lb) 94.8 kg (209 lb) 94 kg (207 lb 3.2 oz) 92.9 kg (204 lb 12.8 oz)    06/03/18 0656 06/03/18 0900 06/04/18 0648   Weight: 93.2 kg (205 lb 7.5 oz) 93.4 kg (205 lb 12.8 oz) 93.2 kg (205 lb 6.4 oz)       2.  O2 sats > 92% on RA or at prior home O2 therapy level.          Current oxygenation status:       SpO2: 91 %         O2 Device: None (Room air),            Able to wean O2 this shift to keep sats > 92%:  NA       Does patient use Home O2? No    3.  Tolerates ambulation and mobility near baseline: No, please explain: increased sensation to BLE causing some \"pain\" with movement, controlled with tylenol.        How many times did the patient ambulate with nursing staff this shift? 0 - up to chair for meals and sitting EOB, encouraged activity with family.    Please review the Heart Failure Care Pathway for additional HF goal outcomes.    Jessica Alba RN  6/4/2018        Vss, no co pain/cp/sob.   Tele 100% Vpaced bbb ST elevation with underlying AFIB SVR.  K+ 3.9, mag 2.0, INR 2.34.  BECKY continue, bre dc'd at 1500, teds on with reduced swelling noted per family.  Plan to dc in am back to [facility] facility.  Continue poc and monitoring.       "

## 2018-06-04 NOTE — PLAN OF CARE
Problem: Cardiac: Heart Failure (Adult)  Goal: Signs and Symptoms of Listed Potential Problems Will be Absent, Minimized or Managed (Cardiac: Heart Failure)  Signs and symptoms of listed potential problems will be absent, minimized or managed by discharge/transition of care (reference Cardiac: Heart Failure (Adult) CPG).   Outcome: No Change  VS stable. Diminished LS. +2 edema to right leg and right ankle. +3 edema to right foot. +1 edema to left ankle and feet. No complaints of pain. Tele is 100% V paced with BBB. Slept well throughout the night.

## 2018-06-04 NOTE — PROGRESS NOTES
SWS:  LINDA notified that pt is able to return to [facility]. LINDA left message with director of Health Services requesting return call to discuss pt return.  SW following.    ADDENDUM- LINDA received call from Xin ([phone]) the RN at [facility]. Xin requests that pt return tomorrow because they only have an RN today that works until 5:00pm. Tomorrow the facility has an RN available until 11:00pm.Xin requests an RN-RN report be called to [phone] in the morning. Xin also requests SW fax dc orders to [phone].

## 2018-06-04 NOTE — PROGRESS NOTES
[facility]    Hospitalist Progress Note    Date of Service (when I saw the patient): 06/04/2018  Provider:  aMnohar Ulrich MD     Initial presenting complaint/issue to hospital (Diagnosis):  Shortness of breath, pulmonary edema secondary to CHF in exacerbation  Assessment & Plan   Summary of Stay: Yolanda Keen is an 88-year-old female with history of diastolic and systolic congestive heart failure, aortic stenosis, tachybrady with atrial fibrillation on anticoagulation who presented earlier with worsening shortness of breath, lower extremity swelling and also suspicious for urinary tract infection and being admitted to the hospital.        1.  Acute on chronic systolic and diastolic congestive heart failure exacerbation.   2.  E. coli/enterococcus urinary tract infection with E coli pansensitive and enterococcus sensitivity is pending   3.  Dementia.   4.  Solitary kidney.   5.  Atrial fibrillation status post permanent pacemaker, on anticoagulation.   6. Severe AS with severe LVH     Continue inpatient care.   2 g sodium salt diet.  Oral lasix 40 mg BID.     Cardiology consult and input appreciated.  Palliative care involvement if patient and family will proceed only with conservative measures regarding aortic stenosis.  Continue on Rocephin.    Warfarin per pharmacy protocol   Discontinue Jackson catheter.     # Pain Assessment:  Current Pain Score 6/4/2018   Patient currently in pain? denies   Pain score (0-10) -   Pain location -   Yolanda oswald pain level was assessed and she currently denies pain.        DVT Prophylaxis: Warfarin  Code Status: DNR/DNI    Disposition: Expected discharge in 24 hours once antibiogram available.  She will need PT OT home health care.    Interval History   Patient is feeling fine, basically asymptomatic, accompanied by her daughter and granddaughter.    -Data reviewed today: I reviewed all new labs and imaging results over the last 24 hours. I personally reviewed  the EKG tracing showing Paced rhythm in the monitor.    Physical Exam   Temp: 97.7  F (36.5  C) Temp src: Oral BP: 120/57   Heart Rate: 62 Resp: 18 SpO2: 93 % O2 Device: None (Room air)    Vitals:    06/03/18 0656 06/03/18 0900 06/04/18 0648   Weight: 93.2 kg (205 lb 7.5 oz) 93.4 kg (205 lb 12.8 oz) 93.2 kg (205 lb 6.4 oz)     Vital Signs with Ranges  Temp:  [97.1  F (36.2  C)-98  F (36.7  C)] 97.7  F (36.5  C)  Heart Rate:  [54-62] 62  Resp:  [18-20] 18  BP: (120-144)/(57-81) 120/57  SpO2:  [91 %-93 %] 93 %  I/O last 3 completed shifts:  In: 580 [P.O.:480; I.V.:100]  Out: 1675 [Urine:1675]    GEN:  Alert, cooperative, appears comfortable, NAD.  HEENT:  Normocephalic/atraumatic, no scleral icterus, no nasal discharge, mouth moist.  CV:  Regular rate and rhythm, TRENTON III/VI Ao,  no JVD.      LUNGS:  Clear to auscultation bilaterally without rales/rhonchi/wheezing/retractions.  Symmetric chest rise on inhalation noted.  ABD:  Active bowel sounds, soft, non-tender/non-distended.  No rebound/guarding/rigidity.  EXT:  No edema or cyanosis.  No joint synovitis noted.  SKIN:  Dry to touch, no exanthems noted in the visualized areas.       Medications     - MEDICATION INSTRUCTIONS -       Warfarin Therapy Reminder         atenolol (TENORMIN) tablet 50 mg  50 mg Oral BID     cefTRIAXone  1 g Intravenous Q24H     cholecalciferol  5,000 Units Oral QAM     citalopram (celeXA) tablet 20 mg  20 mg Oral QAM     furosemide (LASIX) tablet 40 mg  40 mg Oral BID     losartan (COZAAR) tablet 100 mg  100 mg Oral QAM     warfarin  2.5 mg Oral ONCE at 18:00       Data     Recent Labs  Lab 06/04/18  0710 06/03/18  0619 06/02/18  0742 06/01/18  1157  05/31/18   WBC  --   --   --  6.1  --  8.0   HGB  --   --   --  12.6  --  12.5   MCV  --   --   --  87  --  85   PLT  --   --   --  171  --  172   INR 2.34* 2.01* 1.91* 1.97*  < >  --     139 142 142  --  140   POTASSIUM 3.9 4.0 3.8 4.0  --  4.0   CHLORIDE 102 102 103 105  --  102   CO2  33* 33* 32 35*  --  30   BUN 24 24 23 21  --  19   CR 0.74 0.78 0.78 0.84  --  0.72   ANIONGAP 3 4 7 2*  --  8   ERIN 9.1 9.4 9.1 9.5  --  9.4   GLC 87 90 89 97  --  82   TROPI  --   --   --  <0.015  --   --    < > = values in this interval not displayed.    No results found for this or any previous visit (from the past 24 hour(s)).        Disclaimer: This note consists of symbols derived from keyboarding, dictation and/or voice recognition software. As a result, there may be errors in the script that have gone undetected. Please consider this when interpreting information found in this chart.

## 2018-06-05 NOTE — PLAN OF CARE
Problem: Cardiac: Heart Failure (Adult)  Goal: Signs and Symptoms of Listed Potential Problems Will be Absent, Minimized or Managed (Cardiac: Heart Failure)  Signs and symptoms of listed potential problems will be absent, minimized or managed by discharge/transition of care (reference Cardiac: Heart Failure (Adult) CPG).   Pt forgetful, bed alarm on.  Denies pain or distress.  Telemetry V paced with underlying afib.  Tolerating 2 gram sodium diet po.  Incontinent of urine.  Magnesium level 2.0, Potassium level 3.9.  Pt  discharged to [facility] with daughter with discharge instructions at 1225.

## 2018-06-05 NOTE — PLAN OF CARE
"Problem: Patient Care Overview  Goal: Plan of Care/Patient Progress Review  Outcome: No Change  20:29  Text page sent to M.D. \"HR mid 50's  /68  She has a scheduled dose of 50mg atenolol now.  Do you want full dose given? And should there be parameters on the MAR?  Thank you.\"  MD placed parameters.    21:30  Heart Failure Care Pathway  GOALS TO BE MET BEFORE DISCHARGE:    1. Decrease congestion and/or edema with diuretic therapy to achieve near      optimal volume status.            Dyspnea improved: yes            Edema improved:  yes        Net I/O and Weights since admission:          05/05 2300 - 06/04 2259  In: 1800 [P.O.:1700; I.V.:100]  Out: 6375 [Urine:6375]  Net: -4575            Vitals:    06/01/18 1138 06/01/18 1541 06/02/18 0658 06/02/18 0711   Weight: 93.9 kg (207 lb) 94.8 kg (209 lb) 94 kg (207 lb 3.2 oz) 92.9 kg (204 lb 12.8 oz)    06/03/18 0656 06/03/18 0900 06/04/18 0648   Weight: 93.2 kg (205 lb 7.5 oz) 93.4 kg (205 lb 12.8 oz) 93.2 kg (205 lb 6.4 oz)       2.  O2 sats > 92% on RA or at prior home O2 therapy level.          Current oxygenation status:       SpO2: 93 %         O2 Device: None (Room air),            Able to wean O2 this shift to keep sats > 92%:  NA       Does patient use Home O2? No    3.  Tolerates ambulation and mobility near baseline: No.  Ambulated slowly.  Requires assist of 1 c/o right foot pain today        How many times did the patient ambulate with nursing staff this shift? 1    Please review the Heart Failure Care Pathway for additional HF goal outcomes.      Has hx of dementia.Tele a-fib with paced beats. 2gm sodium diet.  Had BM this shift.  Jackson was removed on day shift.  Voided this shift. D/C to [facility] tomorrow.  DNR/DNI    Jessica Davila RN  6/4/2018       "

## 2018-06-05 NOTE — PLAN OF CARE
Problem: Cardiac: Heart Failure (Adult)  Goal: Signs and Symptoms of Listed Potential Problems Will be Absent, Minimized or Managed (Cardiac: Heart Failure)  Signs and symptoms of listed potential problems will be absent, minimized or managed by discharge/transition of care (reference Cardiac: Heart Failure (Adult) CPG).   Outcome: Improving  Heart Failure Care Pathway  GOALS TO BE MET BEFORE DISCHARGE:    1. Decrease congestion and/or edema with diuretic therapy to achieve near      optimal volume status.            Dyspnea improved:  Yes            Edema improved:     Yes        Net I/O and Weights since admission:          05/06 0700 - 06/05 0659  In: 1800 [P.O.:1700; I.V.:100]  Out: 6375 [Urine:6375]  Net: -4575            Vitals:    06/01/18 1138 06/01/18 1541 06/02/18 0658 06/02/18 0711   Weight: 93.9 kg (207 lb) 94.8 kg (209 lb) 94 kg (207 lb 3.2 oz) 92.9 kg (204 lb 12.8 oz)    06/03/18 0656 06/03/18 0900 06/04/18 0648   Weight: 93.2 kg (205 lb 7.5 oz) 93.4 kg (205 lb 12.8 oz) 93.2 kg (205 lb 6.4 oz)       2.  O2 sats > 92% on RA or at prior home O2 therapy level.          Current oxygenation status:       SpO2: 93 %         O2 Device: None (Room air),            Able to wean O2 this shift to keep sats > 92%:  NA       Does patient use Home O2? No    3.  Tolerates ambulation and mobility near baseline: Yes        How many times did the patient ambulate with nursing staff this shift? 2    Please review the Heart Failure Care Pathway for additional HF goal outcomes.    Lashay Wilson RN  6/5/2018

## 2018-06-05 NOTE — PLAN OF CARE
Problem: Patient Care Overview  Goal: Plan of Care/Patient Progress Review  Outcome: Improving  A/O to self, forgetful, VSS, Tele a-fib with occasional v-paing, 2 gram sodium diet, LS diminished clear, +2 edema to bilateral lower extremities, void x2 this shift, denies pain, cardiology following. family present at bedside, possible discharge today back to memory care with PT/OT home health. Continue with plan of care.

## 2018-06-05 NOTE — PHARMACY-ANTICOAGULATION SERVICE
Clinical Pharmacy- Warfarin Discharge Note  This patient is currently on warfarin for the treatment of Atrial fibrillation.  INR Goal= 2-3      Warfarin PTA Regimen:  (2.5mg daily)      Anticoagulation Dose History     Recent Dosing and Labs Latest Ref Rng & Units 5/17/2018 5/21/2018 6/1/2018 6/2/2018 6/3/2018 6/4/2018 6/5/2018    Warfarin 2.5 mg - - - 2.5 mg - 2.5 mg 2.5 mg -    Warfarin 3 mg - - - - 3 mg - - -    INR 0.86 - 1.14 3.49(H) 2.16(H) 1.97(H) 1.91(H) 2.01(H) 2.34(H) 2.40(H)          Agree with discharging the patient on pta warfarin regimen of 2.5 mg daily.       The patient should have an INR checked within the week..

## 2018-06-05 NOTE — DISCHARGE SUMMARY
[facility]    Discharge Summary  Hospitalist    Date of Admission:  6/1/2018  Date of Discharge:  6/5/2018  Provider:  Manohar Ulrich MD  Date of Service (when I last saw the patient): 06/05/18    Discharge Diagnoses    1.  E. coli/enterococcus urinary tract infection both sensitive to beta lactamics.   2.  Acute on chronic systolic and diastolic congestive heart failure exacerbation secondary to #1.   3.  Dementia.   4.  Solitary kidney.   - CKD stage II, kidney damage with mild decrease in GFR 60-89   5.  Atrial fibrillation status post permanent pacemaker, on anticoagulation.   6. Severe AS with severe LVH  7. Generalized muscle decondition.     Other medical issues:  Past Medical History:   Diagnosis Date     Cardiac pacemaker in situ      Tachy-sanket syndrome (H)        History of Present Illness   Yolanda Kene is an 88 year old female who presented with worsening of dyspnea and edemas.  Please see the admission history and physical for full details.    Hospital Course   Summary of Stay: Yolanda Keen is an 88-year-old female with history of diastolic and systolic congestive heart failure, aortic stenosis, tachybrady with atrial fibrillation on anticoagulation who presented with worsening shortness of breath, lower extremity swelling and also suspicious for urinary tract infection. She admitted to the hospital for further treatment and study.  She was treated with Lasix IV with excellent diuretic response and clinical compensation. Her UC tested positive for E Coli and Enterococcus both pan sensitive.  She had been started on Rocephin on admision with favorable clinical course.   At the moment of discharge she at her baseline of dementia and showing generalized muscle decondition for which she will have home PT / OT. She will complete 5 more days of Keflex and follow up with her PCP.       1.  Acute on chronic systolic and diastolic congestive heart failure exacerbation.   2.  E.  coli/enterococcus urinary tract infection with E coli pansensitive and enterococcus sensitivity is pending   3.  Dementia.   4.  Solitary kidney.   5.  Atrial fibrillation status post permanent pacemaker, on anticoagulation.   6. Severe AS with severe LVH      # Discharge Pain Plan:    - Patient currently has NO PAIN and is not being prescribed pain medications on discharge.      Significant Results and Procedures   See below    Pending Results      Unresulted Labs Ordered in the Past 30 Days of this Admission     Date and Time Order Name Status Description    6/2/2018 0816 Urine Culture Aerobic Bacterial Preliminary           Code Status   DNR / DNI       Primary Care Physician   Lupillo Brasher    GEN:  Alert, cooperative, appears comfortable, NAD.  HEENT:  Normocephalic/atraumatic, no scleral icterus, no nasal discharge, mouth moist.  CV:  Regular rate and rhythm, [place] III/VI Ao,  no JVD.      LUNGS:  Clear to auscultation bilaterally without rales/rhonchi/wheezing/retractions.  Symmetric chest rise on inhalation noted.  ABD:  Active bowel sounds, soft, non-tender/non-distended.  No rebound/guarding/rigidity.  EXT:  No edema or cyanosis.  No joint synovitis noted.  SKIN:  Dry to touch, no exanthems noted in the visualized areas.         Discharge Disposition   Discharged to home    Consultations This Hospital Stay   PHARMACY TO DOSE WARFARIN  CARE COORDINATOR IP CONSULT  CARDIOLOGY IP CONSULT    Time Spent on this Encounter   I, Manohar Ulrich, personally saw the patient today and spent greater than 30 minutes discharging this patient.     Discharge Orders     Home Care PT Referral for Hospital Discharge     Home Care OT Referral for Hospital Discharge     Reason for your hospital stay   E Coli and Enterococcus UTI. CHF decompensation.     Follow-up and recommended labs and tests    Follow up with primary care provider, Lupillo Brasher, within 7 days for hospital follow- up.  No follow up labs or test are  needed.     Activity   Your activity upon discharge: activity as tolerated     MD face to face encounter   Documentation of Face to Face and Certification for Home Health Services    I certify that patient: Yolanda Keen is under my care and that I, or a nurse practitioner or physician's assistant working with me, had a face-to-face encounter that meets the physician face-to-face encounter requirements with this patient on: 6/5/2018.    This encounter with the patient was in whole, or in part, for the following medical condition, which is the primary reason for home health care:   E Coli & Enterococcus UTI.  Decompensation of CHF.   I certify that, based on my findings, the following services are medically necessary home health services: Occupational Therapy and Physical Therapy.    My clinical findings support the need for the above services because: Occupational Therapy Services are needed to assess and treat cognitive ability and address ADL safety due to impairment in memory (dementia). and Physical Therapy Services are needed to assess and treat the following functional impairments: severe generalized muscle decondition     Further, I certify that my clinical findings support that this patient is homebound (i.e. absences from home require considerable and taxing effort and are for medical reasons or [employer] services or infrequently or of short duration when for other reasons) because: Mental health symptoms including: Mental health condition is exacerbated by exposure to crowds, unfamiliar environment or new stressful situations.     Based on the above findings. I certify that this patient is confined to the home and needs intermittent skilled nursing care, physical therapy and/or speech therapy.  The patient is under my care, and I have initiated the establishment of the plan of care.  This patient will be followed by a physician who will periodically review the plan of care.  Physician/Provider to  provide follow up care: Lupillo Brasher    Attending hospital physician (the Medicare certified [facility] provider): Manohar Ulrich  Physician Signature: See electronic signature associated with these discharge orders.  Date: 6/5/2018     DNR/DNI     Diet   Follow this diet upon discharge:    2 gm NA Diet       Discharge Medications   Current Discharge Medication List      START taking these medications    Details   cephALEXin (KEFLEX) 500 MG capsule Take 1 capsule (500 mg) by mouth 3 times daily for 5 days  Qty: 15 capsule, Refills: 0    Associated Diagnoses: Acute cystitis without hematuria         CONTINUE these medications which have NOT CHANGED    Details   ATENOLOL PO Take 50 mg by mouth 2 times daily Hold if SBP < 100 or HR < 60      Cholecalciferol 5000 UNITS TABS Take 1 capsule by mouth every morning      CITALOPRAM HYDROBROMIDE PO Take 20 mg by mouth every morning      FUROSEMIDE PO Take 40 mg by mouth 2 times daily Take 40 mg po in the morning and 40 mg po at noon.      LOSARTAN POTASSIUM PO Take 100 mg by mouth every morning Hold if SBP <100      Potassium (POTASSIMIN PO) Take 20 mEq by mouth every morning      warfarin (COUMADIN) 2.5 MG tablet Take 2.5 mg by mouth daily      ACETAMINOPHEN PO Take 500 mg by mouth 3 times daily as needed for pain      Dextromethorphan-guaiFENesin  MG/5ML syrup Take 10 mLs by mouth every 4 hours as needed for cough      hydrocortisone (ANUSOL-HC) 2.5 % cream Place 1 applicator rectally 2 times daily as needed for hemorrhoids           Allergies   Allergies   Allergen Reactions     Lisinopril      Morphine      Data   Most Recent 3 CBC's:  Recent Labs   Lab Test  06/01/18   1157 05/31/18   WBC  6.1  8.0   HGB  12.6  12.5   MCV  87  85   PLT  171  172      Most Recent 3 BMP's:  Recent Labs   Lab Test  06/04/18   0710  06/03/18   0619  06/02/18   0742   NA  138  139  142   POTASSIUM  3.9  4.0  3.8   CHLORIDE  102  102  103   CO2  33*  33*  32   BUN  24  24  23   CR   0.74  0.78  0.78   ANIONGAP  3  4  7   ERIN  9.1  9.4  9.1   GLC  87  90  89     Most Recent 2 LFT's:No lab results found.  Most Recent INR's and Anticoagulation Dosing History:  Anticoagulation Dose History     Recent Dosing and Labs Latest Ref Rng & Units 5/17/2018 5/21/2018 6/1/2018 6/2/2018 6/3/2018 6/4/2018 6/5/2018    Warfarin 2.5 mg - - - 2.5 mg - 2.5 mg 2.5 mg -    Warfarin 3 mg - - - - 3 mg - - -    INR 0.86 - 1.14 3.49(H) 2.16(H) 1.97(H) 1.91(H) 2.01(H) 2.34(H) 2.40(H)        Most Recent 3 Troponin's:  Recent Labs   Lab Test  06/01/18   1157   TROPI  <0.015     Most Recent Cholesterol Panel:No lab results found.  Most Recent 6 Bacteria Isolates From Any Culture (See EPIC Reports for Culture Details):  Recent Labs   Lab Test  06/01/18   1210   CULT  >100,000 colonies/mL  Escherichia coli  *  >100,000 colonies/mL  Enterococcus faecalis  Additional susceptibilities in progress  6/5/18  *  <10,000 colonies/mL  urogenital maggie  Susceptibility testing not routinely done       Most Recent TSH, T4 and A1c Labs:No lab results found.  Results for orders placed or performed during the hospital encounter of 06/01/18   XR Chest 2 Views    Narrative    XR CHEST 2 VW 6/1/2018 12:25 PM    COMPARISON: None.    HISTORY: Dyspnea.      Impression    IMPRESSION: Enlarged cardiac silhouette. Likely mild pulmonary edema.  Single-lead implanted cardiac pacer over the left chest. No pleural  effusion or pneumothorax.    TANNER HARTLEY MD         Disclaimer: This note consists of symbols derived from keyboarding, dictation and/or voice recognition software. As a result, there may be errors in the script that have gone undetected. Please consider this when interpreting information found in this chart.

## 2018-06-05 NOTE — PROGRESS NOTES
Transition Communication Hand-off for Care Transitions to Next Level of Care Provider    Name: Yolanda Keen  : 1930  MRN #: [id number]  Primary Care Provider: Lupillo Brasher  Primary Care MD Name: Stacykell  Primary Clinic: [address]  [facility] [address]  Primary Care Clinic Name:  Geriatrics  Reason for Hospitalization:  Shortness of breath [R06.02]  Elevated brain natriuretic peptide (BNP) level [R79.89]  Acute cystitis without hematuria [N30.00]  Admit Date/Time: 2018 11:38 AM  Discharge Date:   Payor Source: Payor: [employer] / Plan: [employer] PLAN / Product Type: HMO /     Readmission Assessment Measure (CAMERON) Risk Score/category: Avg    Reason for Communication Hand-off Referral: Admission diagnoses: CHF    Discharge Plan: return to  with PT/[facility]T      Discharge Needs Assessment:  Needs       Most Recent Value    Transportation Available family or friend will provide        Follow-up plan:  No future appointments.    Any outstanding tests or procedures:        Referrals     Future Labs/Procedures    Home Care OT Referral for Hospital Discharge     Comments:    OT to eval and treat    Your provider has ordered home care - occupational therapy. If you have not been contacted within 2 days of your discharge please call the department phone number listed on the top of this document.    Home Care PT Referral for Hospital Discharge     Comments:    PT to eval and treat    Your provider has ordered home care - physical therapy. If you have not been contacted within 2 days of your discharge please call the department phone number listed on the top of this document.          Key Recommendations: pt admitted with CHF/UTI. Pt received IV diuretics and IV antibiotics while here in the hospital. Pt should be following a low sodium diet but specialty diets are not offered at her memory care facility. Currently she was on daily weights until  at [facility] but  staff stated  that order would end 6/4. Changes at discharge were: pt is to take keflex tid x5 days. She dc'd back to [facility] with new Home PT/OT.    Amber Grewal/Miley Morris    AVS/Discharge Summary is the source of truth; this is a helpful guide for improved communication of patient story

## 2018-06-05 NOTE — PROGRESS NOTES
SWS:  LINDA notified that pt is able to return to [facility] today. LINDA spoke with daughter who is present in room and she will transport. LINDA updated RN at [facility] and faxed dc orders.

## 2018-06-07 PROBLEM — I50.42 CHRONIC COMBINED SYSTOLIC AND DIASTOLIC CONGESTIVE HEART FAILURE (H): Status: ACTIVE | Noted: 2018-01-01

## 2018-06-07 NOTE — TELEPHONE ENCOUNTER
Call back received. Molly, states she has POA, and is very interested in palliative care. Aware brochure was mailed today by  with information on palliative care. Informed if palliative Care is the family's decision, they should discuss with patients primary care MD, perhaps the physician at the care facility.   Verbalized understanding. Aware we will not be making any f/u OV based on seeing her during her recent hospital visit.  Verbalized understanding and thankful for the call back.

## 2018-06-07 NOTE — LETTER
6/7/2018        RE: Yolanda Keen  [facility]  [address]  [facility] [address]        [facility]  PRIMARY CARE PROVIDER AND CLINIC:  Lupillo Brasher [address] [facility] [address] / [place] [address]  Chief Complaint   Patient presents with     Hospital F/U     [facility] Medical Record Number:  [id number]    HPI:    Yolanda Keen is a 88 year old  (5/1/1930),admitted to the [facility] from [facility].  Hospital stay 06/01/2018 through 06/05/2018.  Admitted to this facility for  rehab, medical management and nursing care.  HPI information obtained from: facility chart records.      Hospital Course      Summary of Stay: Yolanda Keen is an 88-year-old female with history of diastolic and systolic congestive heart failure, aortic stenosis, tachybrady with atrial fibrillation on anticoagulation who presented with worsening shortness of breath, lower extremity swelling and also suspicious for urinary tract infection. She admitted to the hospital for further treatment and study.  She was treated with Lasix IV with excellent diuretic response and clinical compensation. Her UC tested positive for E Coli and Enterococcus both pan sensitive.  She had been started on Rocephin on admission with favorable clinical course.   At the moment of discharge she at her baseline of dementia and showing generalized muscle decondition for which she will have home PT / OT. She will complete 5 more days of Keflex and follow up with her PCP.       1.  Acute on chronic systolic and diastolic congestive heart failure exacerbation.   2.  E. coli/enterococcus urinary tract infection with E coli pansensitive and enterococcus sensitivity is pending   3.  Dementia.   4.  Solitary kidney.   5.  Atrial fibrillation status post permanent pacemaker, on anticoagulation.   6. Severe AS with severe  LVH     ________________________________________________________________________    Seen today having lunch in her room. Denies pain, chest pain or shortness of breath. Family present later in the afternoon with many questions concerning ongoing care and evaluation for palliative/hospice.     Current issues are:        Chronic combined systolic and diastolic congestive heart failure (H)  Calcific aortic valve stenosis  Cardiac pacemaker in situ  Benign essential hypertension  Chronic a-fib (H)  Lower extremity edema  Cardiology consult inpatient.CHF with normal LV function, severe LVH, severe aortic stenosis. EF 65-70% from Echo 6/2/18. Hx of tachybrady syndrome with pacemaker and anticoagulation with coumadin for a-fib. BP since return to facility average 146/74 with HR 74. She is wearing Nate Hose and her edema appears at baseline. Hold parameters with BP meds. Continues with dyspnea on exertion and PND. She does not use supplemental oxygen at night but could benefit. O2 sats reviewed from hospital in mid to low 90s.     Acute cystitis without hematuria  E coli/enterococcus faecalis d/c with course of Keflex. Microbiology confirms switch to Ampicillin for coverage of both organisms. She denies dysuria, hematuria.     ACP (advance care planning)  Would like to pursue palliative versus TAVR but family is understanding this comes from cardiology. Did explain we could continue conservative approach or explore evaluation for hospice if looking for additional comfort focus services as cardiology noted and discussed the severity of her stenosis.     CODE STATUS/ADVANCE DIRECTIVES DISCUSSION:   DNR / DNI  Patient's living condition: lives in an assisted living facility    ALLERGIES:Lisinopril and Morphine  PAST MEDICAL HISTORY:  has a past medical history of Cardiac pacemaker in situ and Tachy-sanket syndrome (H).  PAST SURGICAL HISTORY:  has a past surgical history that includes TOTAL KNEE ARTHROPLASTY (Right, 2003);  Hysterectomy (1980); Nephrectomy (Left, 1982); and Cholecystectomy (1980).  FAMILY HISTORY: family history is not on file.  SOCIAL HISTORY:  reports that she has never smoked. She does not have any smokeless tobacco history on file.    Post Discharge Medication Reconciliation Status: discharge medications reconciled, continue medications without change.  Current Outpatient Prescriptions   Medication Sig Dispense Refill     ACETAMINOPHEN PO Take 500 mg by mouth 3 times daily as needed for pain       ampicillin (PRINCIPEN) 500 MG capsule Take 500 mg by mouth 2 times daily       ATENOLOL PO Take 50 mg by mouth 2 times daily Hold if SBP < 100 or HR < 60       Cholecalciferol 5000 UNITS TABS Take 1 capsule by mouth every morning       CITALOPRAM HYDROBROMIDE PO Take 20 mg by mouth every morning       Dextromethorphan-guaiFENesin  MG/5ML syrup Take 10 mLs by mouth every 4 hours as needed for cough       FUROSEMIDE PO Take 40 mg by mouth 2 times daily Take 40 mg po in the morning and 40 mg po at noon.       hydrocortisone (ANUSOL-HC) 2.5 % cream Place 1 applicator rectally 2 times daily as needed for hemorrhoids       LOSARTAN POTASSIUM PO Take 100 mg by mouth every morning Hold if SBP <100       Potassium (POTASSIMIN PO) Take 20 mEq by mouth every morning       warfarin (COUMADIN) 2.5 MG tablet Take 2.5 mg by mouth daily       Patient Active Problem List   Diagnosis     Cognitive impairment     Benign essential hypertension     Calcific aortic valve stenosis     Chronic congestive heart failure, unspecified congestive heart failure type (H)     History of influenza     Chronic a-fib (H)     Cardiomegaly     Aortic stenosis     Vitamin D deficiency     Lower extremity edema     Insomnia, unspecified type     Adjustment disorder with depressed mood     Cardiac pacemaker in situ     Encounter for monitoring coumadin therapy     ACP (advance care planning)     CHF (congestive heart failure) (H)     Chronic combined  systolic and diastolic congestive heart failure (H)     ROS:  Limited secondary to cognitive impairment but today pt reports fine    Exam:  /73  Pulse 55  Temp 98.2  F (36.8  C)  Resp 16  SpO2 94%  GENERAL APPEARANCE:  Alert, in no distress  ENT:  Mouth and posterior oropharynx normal, moist mucous membranes, [place], dentures upper  EYES:  EOM, conjunctivae, lids, pupils and irises normal, wears glasses  NECK:  No adenopathy,masses or thyromegaly  RESP:  respiratory effort and palpation of chest normal, lungs diminished but clear  CV:  Palpation and auscultation of heart done , regular rate and rhythm, 3/6 murmur, no rub, or gallop, +1 edema in legs, ankles and feet. Nate Hose  ABDOMEN:  normal bowel sounds, soft, nontender, no hepatosplenomegaly   M/S:   Up with SBA and walker  SKIN:  Inspection of skin and subcutaneous tissue baseline  NEURO:   Examination of sensation by touch normal  PSYCH:  insight and judgement impaired, memory impaired    Lab/Diagnostic data:     CBC RESULTS:   Recent Labs   Lab Test  06/01/18   1157 05/31/18   WBC  6.1  8.0   RBC  4.85  4.88   HGB  12.6  12.5   HCT  42.4  41.4   MCV  87  85   MCH  26.0*  25.6*   MCHC  29.7*  30.2*   RDW  16.2*  16.5*   PLT  171  172       Last Basic Metabolic Panel:  Recent Labs   Lab Test  06/04/18   0710  06/03/18   0619   NA  138  139   POTASSIUM  3.9  4.0   CHLORIDE  102  102   ERIN  9.1  9.4   CO2  33*  33*   BUN  24  24   CR  0.74  0.78   GLC  87  90       ASSESSMENT/PLAN:  (I50.42) Chronic combined systolic and diastolic congestive heart failure (H)  (primary encounter diagnosis)  (I35.0) Calcific aortic valve stenosis  (Z95.0) Cardiac pacemaker in situ  (I10) Benign essential hypertension  (I48.2) Chronic a-fib (H)  (R60.0) Lower extremity edema  Comment: Chronic and challenging to manage  Plan:   -Weekly weights and BP  -Dose adjustments to coumadin based on INR  -Lasix increase to 40 mg po BID  -Potassium 20 mEq po qd  -Follow BMP  - Atenolol  50 mg po BID with hold parameters  -Losartan 100 mg po qd with hold parameters  -supplemental oxygen for nocturnal comfort if needed  -[facility] RN    (N30.00) Acute cystitis without hematuria  Comment: Acute   Plan:   -Ampicillin 500 mg po BID x 7 days    (Z71.89) ACP (advance care planning)  Comment: Ongoing  Plan:   -SW consult added  -Continue discussion with family concerning conservation management, plan of care, goals of care        Total time with an established patient visit in the assisted livin minutes including discussions about the POC and care coordination with the patient and family, daughter and grand daughter . Greater than 50% of total time spent with counseling and coordinating care due to complex care, family involvement with plan/ goals of care, ongoing education of her condition, goals and expectations     Electronically signed by:  JERED Deng CNP                    Sincerely,        JERED Deng CNP

## 2018-06-07 NOTE — TELEPHONE ENCOUNTER
From: Daiana Nicolas      Sent: 6/7/2018  10:08 AM        To: [facility] Heart Team 3     Pt daughter called, Dr Rodgers saw her mom in hospital. She wants to have order placed for palliative care. Daughter Molly wants to schedule appt. Can you call her to discuss? I am send ing her a pamphlet on what the program is.     Thanks, Tracey     Called Daughter Molly, got voice mail. Left message with call back number.

## 2018-06-07 NOTE — PROGRESS NOTES
[place] [facility]  PRIMARY CARE PROVIDER AND CLINIC:  Anshu, Lupillodaniela Coats [address] / [place] [address]  Chief Complaint   Patient presents with     Hospital F/U     [facility] Medical Record Number:  [id number]    HPI:    Yolanda Keen is a 88 year old  (5/1/1930),admitted to the [facility] from [facility].  Hospital stay 06/01/2018 through 06/05/2018.  Admitted to this facility for  rehab, medical management and nursing care.  HPI information obtained from: facility chart records.      Hospital Course      Summary of Stay: Yolanda Keen is an 88-year-old female with history of diastolic and systolic congestive heart failure, aortic stenosis, tachybrady with atrial fibrillation on anticoagulation who presented with worsening shortness of breath, lower extremity swelling and also suspicious for urinary tract infection. She admitted to the hospital for further treatment and study.  She was treated with Lasix IV with excellent diuretic response and clinical compensation. Her UC tested positive for E Coli and Enterococcus both pan sensitive.  She had been started on Rocephin on admission with favorable clinical course.   At the moment of discharge she at her baseline of dementia and showing generalized muscle decondition for which she will have home PT / OT. She will complete 5 more days of Keflex and follow up with her PCP.       1.  Acute on chronic systolic and diastolic congestive heart failure exacerbation.   2.  E. coli/enterococcus urinary tract infection with E coli pansensitive and enterococcus sensitivity is pending   3.  Dementia.   4.  Solitary kidney.   5.  Atrial fibrillation status post permanent pacemaker, on anticoagulation.   6. Severe AS with severe LVH     ________________________________________________________________________    Seen today having lunch in her room. Denies pain, chest pain or shortness of breath. Family present later in  the afternoon with many questions concerning ongoing care and evaluation for palliative/hospice.     Current issues are:        Chronic combined systolic and diastolic congestive heart failure (H)  Calcific aortic valve stenosis  Cardiac pacemaker in situ  Benign essential hypertension  Chronic a-fib (H)  Lower extremity edema  Cardiology consult inpatient.CHF with normal LV function, severe LVH, severe aortic stenosis. EF 65-70% from Echo 6/2/18. Hx of tachybrady syndrome with pacemaker and anticoagulation with coumadin for a-fib. BP since return to facility average 146/74 with HR 74. She is wearing Nate Hose and her edema appears at baseline. Hold parameters with BP meds. Continues with dyspnea on exertion and PND. She does not use supplemental oxygen at night but could benefit. O2 sats reviewed from hospital in mid to low 90s.     Acute cystitis without hematuria  E coli/enterococcus faecalis d/c with course of Keflex. Microbiology confirms switch to Ampicillin for coverage of both organisms. She denies dysuria, hematuria.     ACP (advance care planning)  Would like to pursue palliative versus TAVR but family is understanding this comes from cardiology. Did explain we could continue conservative approach or explore evaluation for hospice if looking for additional comfort focus services as cardiology noted and discussed the severity of her stenosis.     CODE STATUS/ADVANCE DIRECTIVES DISCUSSION:   DNR / DNI  Patient's living condition: lives in an assisted living facility    ALLERGIES:Lisinopril and Morphine  PAST MEDICAL HISTORY:  has a past medical history of Cardiac pacemaker in situ and Tachy-sanket syndrome (H).  PAST SURGICAL HISTORY:  has a past surgical history that includes TOTAL KNEE ARTHROPLASTY (Right, 2003); Hysterectomy (1980); Nephrectomy (Left, 1982); and Cholecystectomy (1980).  FAMILY HISTORY: family history is not on file.  SOCIAL HISTORY:  reports that she has never smoked. She does not have any  smokeless tobacco history on file.    Post Discharge Medication Reconciliation Status: discharge medications reconciled, continue medications without change.  Current Outpatient Prescriptions   Medication Sig Dispense Refill     ACETAMINOPHEN PO Take 500 mg by mouth 3 times daily as needed for pain       ampicillin (PRINCIPEN) 500 MG capsule Take 500 mg by mouth 2 times daily       ATENOLOL PO Take 50 mg by mouth 2 times daily Hold if SBP < 100 or HR < 60       Cholecalciferol 5000 UNITS TABS Take 1 capsule by mouth every morning       CITALOPRAM HYDROBROMIDE PO Take 20 mg by mouth every morning       Dextromethorphan-guaiFENesin  MG/5ML syrup Take 10 mLs by mouth every 4 hours as needed for cough       FUROSEMIDE PO Take 40 mg by mouth 2 times daily Take 40 mg po in the morning and 40 mg po at noon.       hydrocortisone (ANUSOL-HC) 2.5 % cream Place 1 applicator rectally 2 times daily as needed for hemorrhoids       LOSARTAN POTASSIUM PO Take 100 mg by mouth every morning Hold if SBP <100       Potassium (POTASSIMIN PO) Take 20 mEq by mouth every morning       warfarin (COUMADIN) 2.5 MG tablet Take 2.5 mg by mouth daily       Patient Active Problem List   Diagnosis     Cognitive impairment     Benign essential hypertension     Calcific aortic valve stenosis     Chronic congestive heart failure, unspecified congestive heart failure type (H)     History of influenza     Chronic a-fib (H)     Cardiomegaly     Aortic stenosis     Vitamin D deficiency     Lower extremity edema     Insomnia, unspecified type     Adjustment disorder with depressed mood     Cardiac pacemaker in situ     Encounter for monitoring coumadin therapy     ACP (advance care planning)     CHF (congestive heart failure) (H)     Chronic combined systolic and diastolic congestive heart failure (H)     ROS:  Limited secondary to cognitive impairment but today pt reports fine    Exam:  /73  Pulse 55  Temp 98.2  F (36.8  C)  Resp 16  SpO2  94%  GENERAL APPEARANCE:  Alert, in no distress  ENT:  Mouth and posterior oropharynx normal, moist mucous membranes, [place], dentures upper  EYES:  EOM, conjunctivae, lids, pupils and irises normal, wears glasses  NECK:  No adenopathy,masses or thyromegaly  RESP:  respiratory effort and palpation of chest normal, lungs diminished but clear  CV:  Palpation and auscultation of heart done , regular rate and rhythm, 3/6 murmur, no rub, or gallop, +1 edema in legs, ankles and feet. Nate Hose  ABDOMEN:  normal bowel sounds, soft, nontender, no hepatosplenomegaly   M/S:   Up with SBA and walker  SKIN:  Inspection of skin and subcutaneous tissue baseline  NEURO:   Examination of sensation by touch normal  PSYCH:  insight and judgement impaired, memory impaired    Lab/Diagnostic data:     CBC RESULTS:   Recent Labs   Lab Test  06/01/18   1157 05/31/18   WBC  6.1  8.0   RBC  4.85  4.88   HGB  12.6  12.5   HCT  42.4  41.4   MCV  87  85   MCH  26.0*  25.6*   MCHC  29.7*  30.2*   RDW  16.2*  16.5*   PLT  171  172       Last Basic Metabolic Panel:  Recent Labs   Lab Test  06/04/18   0710  06/03/18   0619   NA  138  139   POTASSIUM  3.9  4.0   CHLORIDE  102  102   ERIN  9.1  9.4   CO2  33*  33*   BUN  24  24   CR  0.74  0.78   GLC  87  90       ASSESSMENT/PLAN:  (I50.42) Chronic combined systolic and diastolic congestive heart failure (H)  (primary encounter diagnosis)  (I35.0) Calcific aortic valve stenosis  (Z95.0) Cardiac pacemaker in situ  (I10) Benign essential hypertension  (I48.2) Chronic a-fib (H)  (R60.0) Lower extremity edema  Comment: Chronic and challenging to manage  Plan:   -Weekly weights and BP  -Dose adjustments to coumadin based on INR  -Lasix increase to 40 mg po BID  -Potassium 20 mEq po qd  -Follow BMP  - Atenolol 50 mg po BID with hold parameters  -Losartan 100 mg po qd with hold parameters  -supplemental oxygen for nocturnal comfort if needed  -[facility] RN    (N30.00) Acute cystitis without hematuria  Comment: Acute    Plan:   -Ampicillin 500 mg po BID x 7 days    (Z71.89) ACP (advance care planning)  Comment: Ongoing  Plan:   -SW consult added  -Continue discussion with family concerning conservation management, plan of care, goals of care        Total time with an established patient visit in the assisted livin minutes including discussions about the POC and care coordination with the patient and family, daughter and grand daughter . Greater than 50% of total time spent with counseling and coordinating care due to complex care, family involvement with plan/ goals of care, ongoing education of her condition, goals and expectations     Electronically signed by:  JERED Deng CNP

## 2018-06-07 NOTE — PROGRESS NOTES
[facility]  PRIMARY CARE PROVIDER AND CLINIC:  Anshu, Lupillodaniela Coats [address] / [place] [address]  Chief Complaint   Patient presents with     Hospital F/U     [facility] Medical Record Number:  [id number]    HPI:    Yolanda Keen is a 88 year old  (5/1/1930),admitted to the [facility] from [facility].  Hospital stay 06/01/2018 through 06/05/2018.  Admitted to this facility for  rehab, medical management and nursing care.  HPI information obtained from: facility chart records.      Hospital Course      Summary of Stay: Yolanda Keen is an 88-year-old female with history of diastolic and systolic congestive heart failure, aortic stenosis, tachybrady with atrial fibrillation on anticoagulation who presented with worsening shortness of breath, lower extremity swelling and also suspicious for urinary tract infection. She admitted to the hospital for further treatment and study.  She was treated with Lasix IV with excellent diuretic response and clinical compensation. Her UC tested positive for E Coli and Enterococcus both pan sensitive.  She had been started on Rocephin on admision with favorable clinical course.   At the moment of discharge she at her baseline of dementia and showing generalized muscle decondition for which she will have home PT / OT. She will complete 5 more days of Keflex and follow up with her PCP.       1.  Acute on chronic systolic and diastolic congestive heart failure exacerbation.   2.  E. coli/enterococcus urinary tract infection with E coli pansensitive and enterococcus sensitivity is pending   3.  Dementia.   4.  Solitary kidney.   5.  Atrial fibrillation status post permanent pacemaker, on anticoagulation.   6. Severe AS with severe LVH     ___________________________________________________________________________    Seen today having lunch in her room. Denies pain, chest pain or shortness of breath. Family present later  in the afternoon with many questions concerning ongoing care and evaluation for palliative/hospice.     Current issues are:        Chronic combined systolic and diastolic congestive heart failure (H)  Calcific aortic valve stenosis  Cardiac pacemaker in situ  Benign essential hypertension  Chronic a-fib (H)  Lower extremity edema  CHF with normal LV function, severe LVH, severe aortic stenosis. EF 65-70%. Hx of tachybrady syndrome with pacemaker and anticoagulation with coumadin for a-fib. BP since return to facility average 146/74 with HR 74. She is wearing Nate Hose and her edema appears at baseline. Hold parameters with BP meds. Continues with dyspnea on exertion and PND. She does not use supplemental oxygen at night but family believes she would benefit. O2 sats reviewed from hospital and     Acute cystitis without hematuria  ***    ACP (advance care planning)  ***             CODE STATUS/ADVANCE DIRECTIVES DISCUSSION:   DNR / DNI  Patient's living condition: lives in an assisted living facility    ALLERGIES:Lisinopril and Morphine  PAST MEDICAL HISTORY:  has a past medical history of Cardiac pacemaker in situ and Tachy-sanket syndrome (H).  PAST SURGICAL HISTORY:  has a past surgical history that includes TOTAL KNEE ARTHROPLASTY (Right, 2003); Hysterectomy (1980); Nephrectomy (Left, 1982); and Cholecystectomy (1980).  FAMILY HISTORY: family history is not on file.  SOCIAL HISTORY:  reports that she has never smoked. She does not have any smokeless tobacco history on file.    Post Discharge Medication Reconciliation Status: {ACO Med Rec (Provider):916590}.  Current Outpatient Prescriptions   Medication Sig Dispense Refill     ACETAMINOPHEN PO Take 500 mg by mouth 3 times daily as needed for pain       ampicillin (PRINCIPEN) 500 MG capsule Take 500 mg by mouth 2 times daily       ATENOLOL PO Take 50 mg by mouth 2 times daily Hold if SBP < 100 or HR < 60       Cholecalciferol 5000 UNITS TABS Take 1 capsule by mouth  every morning       CITALOPRAM HYDROBROMIDE PO Take 20 mg by mouth every morning       Dextromethorphan-guaiFENesin  MG/5ML syrup Take 10 mLs by mouth every 4 hours as needed for cough       FUROSEMIDE PO Take 40 mg by mouth 2 times daily Take 40 mg po in the morning and 40 mg po at noon.       hydrocortisone (ANUSOL-HC) 2.5 % cream Place 1 applicator rectally 2 times daily as needed for hemorrhoids       LOSARTAN POTASSIUM PO Take 100 mg by mouth every morning Hold if SBP <100       Potassium (POTASSIMIN PO) Take 20 mEq by mouth every morning       warfarin (COUMADIN) 2.5 MG tablet Take 2.5 mg by mouth daily         ROS:  {ROS FGS:480727}    Exam:  There were no vitals taken for this visit.  {residential physical exam :437017}    Lab/Diagnostic data:     CBC RESULTS:   Recent Labs   Lab Test  06/01/18   1157 05/31/18   WBC  6.1  8.0   RBC  4.85  4.88   HGB  12.6  12.5   HCT  42.4  41.4   MCV  87  85   MCH  26.0*  25.6*   MCHC  29.7*  30.2*   RDW  16.2*  16.5*   PLT  171  172       Last Basic Metabolic Panel:  Recent Labs   Lab Test  06/04/18   0710  06/03/18   0619   NA  138  139   POTASSIUM  3.9  4.0   CHLORIDE  102  102   ERIN  9.1  9.4   CO2  33*  33*   BUN  24  24   CR  0.74  0.78   GLC  87  90       ASSESSMENT/PLAN:  {FGS DX INITIAL:488338}    Orders:  ***    {FGS TIME SPENT:950745}    Electronically signed by:  Jil Fine

## 2018-06-11 NOTE — PROGRESS NOTES
[facility]  NON-FACE TO FACE PROLONGED SERVICE    Yolanda Keen 5/1/1930    Date of Related Face to Face Service: 6/7/18    INTENT OF SERVICE      Regarding the following diagnoses: Chronic combined systolic and diastolic congestive heart failure (H)       Calcific aortic valve stenosis       Cardiac pacemaker in situ    Extended telephone conference call with two of Beth's daughters Pauly and Molly. Molly is listed as financial POA and is also contact for medical POA although many family members are involved with Beth's daily care and weigh in on decision making and planning. This conversation discussed  Beth's ongoing cardiac medical problems including her atrial fibrillation and therapy with coumadin, tachybrady syndrome, severe aortic stenosis, she is status post pacemaker and has ongoing issues with PND, dyspnea on exertion. She has advanced dementia with frail status. Treatment options discussed- both conservative and surgical and the difference between a palliative and hospice approach. The conference call started at 0742 and stopped at 0826.     Assessment/Plan:  The family is interested in evaluation and enrollment for hospice. They are interested in additional supportive services offered by hospice program. They were told with her past hospital visit, which included consultation with cardiology, that prognosis and natural history of severe aortic stenosis is with 50% mortality in 1 year and also risk of sudden cardiac death. Order was placed for hospice to eval.        TIME  Total time spent with Non-Face to Face prolonged service 44  minutes.     Electronically signed by:  JERED Deng CNP

## 2018-06-14 NOTE — LETTER
6/14/2018        RE: Yolanda Keen  [facility]  [address] [address]        [facility]  Chief Complaint   Patient presents with     Annual Comprehensive Exam Assisted Living     INR RESULTS       [facility] Medical Record Number:  [id number]    HPI:    Yolanda Keen is a 88 year old  (5/1/1930), who is being seen today for an annual comprehensive visit at [facility].  HPI information obtained from: facility chart records and [facility] chart review.  Today's concerns are:    Chronic combined systolic and diastolic congestive heart failure (H)  Chronic a-fib (H)  Benign essential hypertension  Calcific aortic valve stenosis  Lower extremity edema  Cardiology consult inpatient.CHF with normal LV function, severe LVH, severe aortic stenosis. EF 65-70% from Echo 6/2/18. Hx of tachybrady syndrome with pacemaker and anticoagulation with coumadin for a-fib. BP facility average 134/70 with HR 70. She is wearing Nate Hose and her edema appears at baseline. Hold parameters with BP meds. Continues with dyspnea on exertion and PND. She does not use supplemental oxygen at night but could benefit. O2 sats reviewed from hospital in mid to low 90s. Sats dropped to 85% while working with PT at [facility].    Solitary kidney  Creatinine at 0.74 and GFR at 74.    ACP (advance care planning)  With poor cardiac function and overall generalized decline having evaluation for hospice.    Based on JNC-8 goals,  patients age of 88 year old, presence of diabetes or CKD, and goals of care goal BP is  <140/90 mm Hg. Patient is stable with current plan of care and routine assessment..      ALLERGIES: Lisinopril and Morphine  PROBLEM LIST:  Patient Active Problem List   Diagnosis     Cognitive impairment     Benign essential hypertension     Calcific aortic valve stenosis     Chronic congestive heart failure, unspecified congestive heart failure type (H)     History of  influenza     Chronic a-fib (H)     Cardiomegaly     Aortic stenosis     Vitamin D deficiency     Lower extremity edema     Insomnia, unspecified type     Adjustment disorder with depressed mood     Cardiac pacemaker in situ     Encounter for monitoring coumadin therapy     ACP (advance care planning)     CHF (congestive heart failure) (H)     Chronic combined systolic and diastolic congestive heart failure (H)     Solitary kidney     PAST MEDICAL HISTORY:  has a past medical history of Cardiac pacemaker in situ and Tachy-sanket syndrome (H).  PAST SURGICAL HISTORY:  has a past surgical history that includes TOTAL KNEE ARTHROPLASTY (Right, 2003); Hysterectomy (1980); Nephrectomy (Left, 1982); and Cholecystectomy (1980).  FAMILY HISTORY: family history is not on file.  SOCIAL HISTORY:  reports that she has never smoked. She does not have any smokeless tobacco history on file.  IMMUNIZATIONS:  Most Recent Immunizations   Administered Date(s) Administered     Influenza (High Dose) 3 valent vaccine 09/28/2017     Influenza (IIV3) PF 11/05/2015     Pneumo Conj 13-V (2010&after) 11/02/2016     Pneumococcal 23 valent 10/31/2014     Td (Adult), Adsorbed 01/05/2009     Varicella 09/01/2014     Above immunizations pulled from [employer]. [facility] and facility records also reconciled. Outstanding information sent to  to update [employer] [facility] [employer].  Future immunizations are not needed at this point as all recommended immunizations are up to date.   MEDICATIONS:  Current Outpatient Prescriptions   Medication Sig Dispense Refill     ACETAMINOPHEN PO Take 500 mg by mouth 3 times daily as needed for pain       ATENOLOL PO Take 50 mg by mouth 2 times daily Hold if SBP < 100 or HR < 60       Cholecalciferol 5000 UNITS TABS Take 1 capsule by mouth every morning       CITALOPRAM HYDROBROMIDE PO Take 20 mg by mouth every morning       Dextromethorphan-guaiFENesin  MG/5ML syrup Take 10 mLs by mouth every 4 hours as needed  for cough       FUROSEMIDE PO Take 40 mg by mouth 2 times daily Take 40 mg po in the morning and 40 mg po at noon.       hydrocortisone (ANUSOL-HC) 2.5 % cream Place 1 applicator rectally 2 times daily as needed for hemorrhoids       LOSARTAN POTASSIUM PO Take 100 mg by mouth every morning Hold if SBP <100       Potassium (POTASSIMIN PO) Take 20 mEq by mouth every morning       warfarin (COUMADIN) 2.5 MG tablet Take 2.5 mg by mouth daily       Medications reviewed:  Medications reconciled to facility chart and changes were made to reflect current medications as identified as above med list. Below are the changes that were made:   Medications stopped since last EPIC medication reconciliation:   Medications Discontinued During This Encounter   Medication Reason     ampicillin (PRINCIPEN) 500 MG capsule Discontinued by another Health Care Provider       Medications started since last [facility] medication reconciliation:  No orders of the defined types were placed in this encounter.      Case Management:  I have reviewed the Assisted Living care plan, current immunizations and preventive care/cancer screening..Future cancer screening is not clinically indicated secondary to age/goals of care Patient's desire to return to the community is not assessible due to cognitive impairment. Current Level of Care is appropriate.    Advance Directive Discussion:    I reviewed the current advanced directives as reflected in EPIC, the POLST and the facility chart, and verified the congruency of orders 6/14/18. I contacted the first party Molly and discussed the plan of Care.  I did not due to cognitive impairment review the advance directives with the resident.     Team Discussion:  I communicated with the appropriate disciplines involved with the Plan of Care:   Nursing      Patient Goal:  Patient's goal is pain control and comfort.    Information reviewed:  Medications, vital signs, orders, and nursing notes.    ROS:  Limited secondary  to cognitive impairment but today pt reports fine    Exam:  /75  Pulse 69  Temp 98.6  F (37  C)  Resp 16  Wt 207 lb (93.9 kg)  SpO2 97%  BMI 31.47 kg/m2  GENERAL APPEARANCE:  Alert, in no distress  ENT:  Mouth and posterior oropharynx normal, moist mucous membranes, [place], dentures upper  EYES:  EOM, conjunctivae, lids, pupils and irises normal, wears glasses  NECK:  No adenopathy,masses or thyromegaly  RESP:  respiratory effort and palpation of chest normal, lungs diminished but clear  CV:  Palpation and auscultation of heart done , regular rate and rhythm, 3/6 murmur, no rub, or gallop, +1 edema in legs, ankles and feet. Nate Hose  ABDOMEN:  normal bowel sounds, soft, nontender, no hepatosplenomegaly, rounded  M/S:   Up with SBA and walker  SKIN:  Inspection of skin and subcutaneous tissue baseline  NEURO:   Examination of sensation by touch normal  PSYCH:  insight and judgement impaired, memory impaired     Lab/Diagnostic data:     CBC RESULTS:   Recent Labs   Lab Test  06/01/18   1157 05/31/18   WBC  6.1  8.0   RBC  4.85  4.88   HGB  12.6  12.5   HCT  42.4  41.4   MCV  87  85   MCH  26.0*  25.6*   MCHC  29.7*  30.2*   RDW  16.2*  16.5*   PLT  171  172       Last Basic Metabolic Panel:  Recent Labs   Lab Test  06/04/18   0710  06/03/18   0619   NA  138  139   POTASSIUM  3.9  4.0   CHLORIDE  102  102   ERIN  9.1  9.4   CO2  33*  33*   BUN  24  24   CR  0.74  0.78   GLC  87  90       ASSESSMENT/PLAN  (I50.42) Chronic combined systolic and diastolic congestive heart failure (H)  (primary encounter diagnosis)  (I48.2) Chronic a-fib (H)  (I10) Benign essential hypertension  (I35.0) Calcific aortic valve stenosis  (R60.0) Lower extremity edema  Comment: Chronic and progressing  Plan:   -Weekly weights and BP  -Dose adjustments to coumadin based on INR ( today is 2.66) 2.5 mg po daily   -Next INR is 6/21/18  -Lasix increase to 40 mg po BID  -Potassium 20 mEq po qd  -Follow BMP  - Atenolol 50 mg po BID with hold  parameters  -Losartan 100 mg po qd with hold parameters  -supplemental oxygen for nocturnal comfort if needed  -[facility] RN    (Q60.0) Solitary kidney  Comment: Chronic  Plan:   -Avoid nephrotoxic drugs, renal dose medications     (Z71.89) ACP (advance care planning)  Comment: Ongoing  Plan:   -Hospice evaluation today, meets criteria and will be enrolling into program        Electronically signed by:  JERED Deng CNP          Sincerely,        JERED Deng CNP

## 2018-06-14 NOTE — PROGRESS NOTES
[facility]  Chief Complaint   Patient presents with     Annual Comprehensive Exam Assisted Living     INR RESULTS       [facility] Medical Record Number:  [id number]    HPI:    Yolanda Keen is a 88 year old  (5/1/1930), who is being seen today for an annual comprehensive visit at [facility].  HPI information obtained from: facility chart records and [facility] Epic chart review.  Today's concerns are:    Chronic combined systolic and diastolic congestive heart failure (H)  Chronic a-fib (H)  Benign essential hypertension  Calcific aortic valve stenosis  Lower extremity edema  Cardiology consult inpatient.CHF with normal LV function, severe LVH, severe aortic stenosis. EF 65-70% from Echo 6/2/18. Hx of tachybrady syndrome with pacemaker and anticoagulation with coumadin for a-fib. BP facility average 134/70 with HR 70. She is wearing Nate Hose and her edema appears at baseline. Hold parameters with BP meds. Continues with dyspnea on exertion and PND. She does not use supplemental oxygen at night but could benefit. O2 sats reviewed from hospital in mid to low 90s. Sats dropped to 85% while working with PT at [facility].    Solitary kidney  Creatinine at 0.74 and GFR at 74.    ACP (advance care planning)  With poor cardiac function and overall generalized decline having evaluation for hospice.    Based on JNC-8 goals,  patients age of 88 year old, presence of diabetes or CKD, and goals of care goal BP is  <140/90 mm Hg. Patient is stable with current plan of care and routine assessment..      ALLERGIES: Lisinopril and Morphine  PROBLEM LIST:  Patient Active Problem List   Diagnosis     Cognitive impairment     Benign essential hypertension     Calcific aortic valve stenosis     Chronic congestive heart failure, unspecified congestive heart failure type (H)     History of influenza     Chronic a-fib (H)     Cardiomegaly     Aortic stenosis     Vitamin D deficiency     Lower extremity edema      Insomnia, unspecified type     Adjustment disorder with depressed mood     Cardiac pacemaker in situ     Encounter for monitoring coumadin therapy     ACP (advance care planning)     CHF (congestive heart failure) (H)     Chronic combined systolic and diastolic congestive heart failure (H)     Solitary kidney     PAST MEDICAL HISTORY:  has a past medical history of Cardiac pacemaker in situ and Tachy-sanket syndrome (H).  PAST SURGICAL HISTORY:  has a past surgical history that includes TOTAL KNEE ARTHROPLASTY (Right, 2003); Hysterectomy (1980); Nephrectomy (Left, 1982); and Cholecystectomy (1980).  FAMILY HISTORY: family history is not on file.  SOCIAL HISTORY:  reports that she has never smoked. She does not have any smokeless tobacco history on file.  IMMUNIZATIONS:  Most Recent Immunizations   Administered Date(s) Administered     Influenza (High Dose) 3 valent vaccine 09/28/2017     Influenza (IIV3) PF 11/05/2015     Pneumo Conj 13-V (2010&after) 11/02/2016     Pneumococcal 23 valent 10/31/2014     Td (Adult), Adsorbed 01/05/2009     Varicella 09/01/2014     Above immunizations pulled from [employer]. [facility] and facility records also reconciled. Outstanding information sent to  to update [employer].  Future immunizations are not needed at this point as all recommended immunizations are up to date.   MEDICATIONS:  Current Outpatient Prescriptions   Medication Sig Dispense Refill     ACETAMINOPHEN PO Take 500 mg by mouth 3 times daily as needed for pain       ATENOLOL PO Take 50 mg by mouth 2 times daily Hold if SBP < 100 or HR < 60       Cholecalciferol 5000 UNITS TABS Take 1 capsule by mouth every morning       CITALOPRAM HYDROBROMIDE PO Take 20 mg by mouth every morning       Dextromethorphan-guaiFENesin  MG/5ML syrup Take 10 mLs by mouth every 4 hours as needed for cough       FUROSEMIDE PO Take 40 mg by mouth 2 times daily Take 40 mg po in the morning and 40 mg po at noon.        hydrocortisone (ANUSOL-HC) 2.5 % cream Place 1 applicator rectally 2 times daily as needed for hemorrhoids       LOSARTAN POTASSIUM PO Take 100 mg by mouth every morning Hold if SBP <100       Potassium (POTASSIMIN PO) Take 20 mEq by mouth every morning       warfarin (COUMADIN) 2.5 MG tablet Take 2.5 mg by mouth daily       Medications reviewed:  Medications reconciled to facility chart and changes were made to reflect current medications as identified as above med list. Below are the changes that were made:   Medications stopped since last EPIC medication reconciliation:   Medications Discontinued During This Encounter   Medication Reason     ampicillin (PRINCIPEN) 500 MG capsule Discontinued by another Health Care Provider       Medications started since last [facility] medication reconciliation:  No orders of the defined types were placed in this encounter.      Case Management:  I have reviewed the Assisted Living care plan, current immunizations and preventive care/cancer screening..Future cancer screening is not clinically indicated secondary to age/goals of care Patient's desire to return to the community is not assessible due to cognitive impairment. Current Level of Care is appropriate.    Advance Directive Discussion:    I reviewed the current advanced directives as reflected in EPIC, the POLST and the facility chart, and verified the congruency of orders 6/14/18. I contacted the first party Molly and discussed the plan of Care.  I did not due to cognitive impairment review the advance directives with the resident.     Team Discussion:  I communicated with the appropriate disciplines involved with the Plan of Care:   Nursing      Patient Goal:  Patient's goal is pain control and comfort.    Information reviewed:  Medications, vital signs, orders, and nursing notes.    ROS:  Limited secondary to cognitive impairment but today pt reports fine    Exam:  /75  Pulse 69  Temp 98.6  F (37  C)  Resp 16  Wt  207 lb (93.9 kg)  SpO2 97%  BMI 31.47 kg/m2  GENERAL APPEARANCE:  Alert, in no distress  ENT:  Mouth and posterior oropharynx normal, moist mucous membranes, [place], dentures upper  EYES:  EOM, conjunctivae, lids, pupils and irises normal, wears glasses  NECK:  No adenopathy,masses or thyromegaly  RESP:  respiratory effort and palpation of chest normal, lungs diminished but clear  CV:  Palpation and auscultation of heart done , regular rate and rhythm, 3/6 murmur, no rub, or gallop, +1 edema in legs, ankles and feet. Nate Hose  ABDOMEN:  normal bowel sounds, soft, nontender, no hepatosplenomegaly, rounded  M/S:   Up with SBA and walker  SKIN:  Inspection of skin and subcutaneous tissue baseline  NEURO:   Examination of sensation by touch normal  PSYCH:  insight and judgement impaired, memory impaired     Lab/Diagnostic data:     CBC RESULTS:   Recent Labs   Lab Test  06/01/18   1157 05/31/18   WBC  6.1  8.0   RBC  4.85  4.88   HGB  12.6  12.5   HCT  42.4  41.4   MCV  87  85   MCH  26.0*  25.6*   MCHC  29.7*  30.2*   RDW  16.2*  16.5*   PLT  171  172       Last Basic Metabolic Panel:  Recent Labs   Lab Test  06/04/18   0710  06/03/18   0619   NA  138  139   POTASSIUM  3.9  4.0   CHLORIDE  102  102   ERIN  9.1  9.4   CO2  33*  33*   BUN  24  24   CR  0.74  0.78   GLC  87  90       ASSESSMENT/PLAN  (I50.42) Chronic combined systolic and diastolic congestive heart failure (H)  (primary encounter diagnosis)  (I48.2) Chronic a-fib (H)  (I10) Benign essential hypertension  (I35.0) Calcific aortic valve stenosis  (R60.0) Lower extremity edema  Comment: Chronic and progressing  Plan:   -Weekly weights and BP  -Dose adjustments to coumadin based on INR ( today is 2.66) 2.5 mg po daily   -Next INR is 6/21/18  -Lasix increase to 40 mg po BID  -Potassium 20 mEq po qd  -Follow BMP  - Atenolol 50 mg po BID with hold parameters  -Losartan 100 mg po qd with hold parameters  -supplemental oxygen for nocturnal comfort if needed  -[facility]  RN    (Q60.0) Solitary kidney  Comment: Chronic  Plan:   -Avoid nephrotoxic drugs, renal dose medications     (Z71.89) ACP (advance care planning)  Comment: Ongoing  Plan:   -Hospice evaluation today, meets criteria and will be enrolling into program        Electronically signed by:  JERED Deng CNP

## 2018-06-21 NOTE — PROGRESS NOTES
[facility]    HPI:    Yolanda Keen is a 87 year old  (5/1/1930), who is being seen today for an episodic care visit at [facility].   HPI information obtained from: family, staff and facility chart records. Today's concern is INR/Coumadin management for A. Fib and new onset of hematuria which was first noted and reported last night. Recent labs within normal limits. PMH includes CHF, AS, A-fib, Solitary kidney, hemorrhoids .    Seen early this morning she denies pain, chest pain, SOB, abdominal discomfort, nausea, vomiting, flank pain, lightheadedness. She is experiencing painless vaginal bleeding which began last night. No trauma noted to vaginal area with exam, negative for rectal bleeding.    Bleeding Signs/Symptoms:  YES  Thromboembolic Signs/Symptoms:  None    Medication Changes:  No  Dietary Changes:  No  Activity Changes: No  Bacterial/Viral Infection:  No recently completed treatment for  uti.     Missed Coumadin Doses:  None    On ASA: No    Other Concerns: New to hospice    Current Outpatient Prescriptions   Medication     acetaminophen (TYLENOL) 650 MG Suppository     ACETAMINOPHEN PO     ATENOLOL PO     atropine 1 % ophthalmic solution     bisacodyl (DULCOLAX) 10 MG Suppository     CITALOPRAM HYDROBROMIDE PO     Dextromethorphan-guaiFENesin  MG/5ML syrup     FUROSEMIDE PO     HALOPERIDOL PO     hydrocortisone (ANUSOL-HC) 2.5 % cream     HYDROMORPHONE HCL PO     LOSARTAN POTASSIUM PO     Potassium (POTASSIMIN PO)     senna-docusate (SENOKOT-S;PERICOLACE) 8.6-50 MG per tablet     No current facility-administered medications for this visit.        Patient Active Problem List   Diagnosis     Cognitive impairment     Benign essential hypertension     Calcific aortic valve stenosis     Chronic congestive heart failure, unspecified congestive heart failure type (H)     History of influenza     Chronic a-fib (H)     Cardiomegaly     Aortic stenosis     Vitamin D  deficiency     Lower extremity edema     Insomnia, unspecified type     Adjustment disorder with depressed mood     Cardiac pacemaker in situ     Encounter for monitoring coumadin therapy     ACP (advance care planning)     CHF (congestive heart failure) (H)     Chronic combined systolic and diastolic congestive heart failure (H)     Solitary kidney       OBJECTIVE:  /76  Pulse 61  Temp 98  F (36.7  C)  Resp 16  SpO2 94%  GENERAL APPEARANCE:  Alert, in no distress  ENT:  Mouth and posterior oropharynx normal, moist mucous membranes, [place], dentures upper  EYES:  EOM, conjunctivae, lids, pupils and irises normal, wears glasses  NECK:  No adenopathy,masses or thyromegaly  RESP:  respiratory effort and palpation of chest normal, lungs diminished but clear  CV:  Palpation and auscultation of heart done , regular rate and rhythm, 3/6 murmur, no rub, or gallop, +1 edema in legs, ankles and feet. Nate Hose  ABDOMEN:  normal bowel sounds, soft, nontender, no hepatosplenomegaly, rounded, no pain with palpation, no flank pain with palpation  : sourav blood from vagina, one 0.25 cm clot noted.   M/S:   Up with SBA and walker  SKIN:  Inspection of skin and subcutaneous tissue baseline  NEURO:   Examination of sensation by touch normal  PSYCH:  insight and judgement impaired, memory impaired       INR Today: 2.73  Current Dose: 2.5 mg po daily    ASSESSMENT:  (Z51.81,  Z79.01) Encounter for monitoring coumadin therapy  (primary encounter diagnosis)  (I48.2) Chronic a-fib (H)  (R31.0) Gross Hematuria    Therapeutic INR for goal of 2-3    PLAN:    New Dose: Holding coumadin. Seen in conjunction with hospice RN and hospice MD consulted for plan of care. Monitoring hematuria, flow is heavy and scant at times. VS are taken twice daily and weights weekly. No portable ultrasound at this time. No labs ordered. Goal of care is comfort focus and she denies pain or discomfort.    Total time with an established patient visit in the  assisted livin minutes including discussions about the POC and care coordination with the patient and family (many members). Greater than 50% of total time spent with counseling and coordinating care due to questions and concerns from family, review and discussion of possible differential diagnosis, different onsite evaluation and assessment options for onsite care, coordination with hospice on plan of care.            Electronically signed by:  JERED Deng CNP

## 2018-06-21 NOTE — LETTER
6/21/2018        RE: Yolanda Keen  [facility]  [address] [facility] [address]          [facility]    HPI:    Yolanda Keen is a 87 year old  (5/1/1930), who is being seen today for an episodic care visit at [facility].   HPI information obtained from: family, staff and facility chart records. Today's concern is INR/Coumadin management for A. Fib and new onset of hematuria which was first noted and reported last night. Recent labs within normal limits. PMH includes CHF, AS, A-fib, Solitary kidney, hemorrhoids .    Seen early this morning she denies pain, chest pain, SOB, abdominal discomfort, nausea, vomiting, flank pain, lightheadedness. She is experiencing painless vaginal bleeding which began last night. No trauma noted to vaginal area with exam, negative for rectal bleeding.    Bleeding Signs/Symptoms:  YES  Thromboembolic Signs/Symptoms:  None    Medication Changes:  No  Dietary Changes:  No  Activity Changes: No  Bacterial/Viral Infection:  No recently completed treatment for  uti.     Missed Coumadin Doses:  None    On ASA: No    Other Concerns: New to hospice    Current Outpatient Prescriptions   Medication     acetaminophen (TYLENOL) 650 MG Suppository     ACETAMINOPHEN PO     ATENOLOL PO     atropine 1 % ophthalmic solution     bisacodyl (DULCOLAX) 10 MG Suppository     CITALOPRAM HYDROBROMIDE PO     Dextromethorphan-guaiFENesin  MG/5ML syrup     FUROSEMIDE PO     HALOPERIDOL PO     hydrocortisone (ANUSOL-HC) 2.5 % cream     HYDROMORPHONE HCL PO     LOSARTAN POTASSIUM PO     Potassium (POTASSIMIN PO)     senna-docusate (SENOKOT-S;PERICOLACE) 8.6-50 MG per tablet     No current facility-administered medications for this visit.        Patient Active Problem List   Diagnosis     Cognitive impairment     Benign essential hypertension     Calcific aortic valve stenosis     Chronic congestive heart failure, unspecified congestive heart  failure type (H)     History of influenza     Chronic a-fib (H)     Cardiomegaly     Aortic stenosis     Vitamin D deficiency     Lower extremity edema     Insomnia, unspecified type     Adjustment disorder with depressed mood     Cardiac pacemaker in situ     Encounter for monitoring coumadin therapy     ACP (advance care planning)     CHF (congestive heart failure) (H)     Chronic combined systolic and diastolic congestive heart failure (H)     Solitary kidney       OBJECTIVE:  /76  Pulse 61  Temp 98  F (36.7  C)  Resp 16  SpO2 94%  GENERAL APPEARANCE:  Alert, in no distress  ENT:  Mouth and posterior oropharynx normal, moist mucous membranes, [place], dentures upper  EYES:  EOM, conjunctivae, lids, pupils and irises normal, wears glasses  NECK:  No adenopathy,masses or thyromegaly  RESP:  respiratory effort and palpation of chest normal, lungs diminished but clear  CV:  Palpation and auscultation of heart done , regular rate and rhythm, 3/6 murmur, no rub, or gallop, +1 edema in legs, ankles and feet. Nate Hose  ABDOMEN:  normal bowel sounds, soft, nontender, no hepatosplenomegaly, rounded, no pain with palpation, no flank pain with palpation  : sourav blood from vagina, one 0.25 cm clot noted.   M/S:   Up with SBA and walker  SKIN:  Inspection of skin and subcutaneous tissue baseline  NEURO:   Examination of sensation by touch normal  PSYCH:  insight and judgement impaired, memory impaired       INR Today: 2.73  Current Dose: 2.5 mg po daily    ASSESSMENT:  (Z51.81,  Z79.01) Encounter for monitoring coumadin therapy  (primary encounter diagnosis)  (I48.2) Chronic a-fib (H)  (R31.0) Gross Hematuria    Therapeutic INR for goal of 2-3    PLAN:    New Dose: Holding coumadin. Seen in conjunction with hospice RN and hospice MD consulted for plan of care. Monitoring hematuria, flow is heavy and scant at times. VS are taken twice daily and weights weekly. No portable ultrasound at this time. No labs ordered. Goal  of care is comfort focus and she denies pain or discomfort.    Total time with an established patient visit in the assisted livin minutes including discussions about the POC and care coordination with the patient and family (many members). Greater than 50% of total time spent with counseling and coordinating care due to questions and concerns from family, review and discussion of possible differential diagnosis, different onsite evaluation and assessment options for onsite care, coordination with hospice on plan of care.            Electronically signed by:  JERED Deng CNP        Sincerely,        JERED Deng CNP

## 2018-07-26 NOTE — PROGRESS NOTES
[facility]    Chief Complaint   Patient presents with     Nursing Home Acute     Home Care/Hospice       [facility] Medical Record Number:  [id number]    HPI:    Yolanda Keen is a 88 year old  (5/1/1930), who is being seen today for an episodic care visit at [facility].  HPI information obtained from: facility chart records, family, hospice.Today's concern is:    Declining functional status  Resident with ongoing functional declining status and transitioning to actively dying. Today with visit she appears calm and pleasant. Many family members bedside and she does open her eyes and is tracking my presence at times. Oxygen sats are low and she has been refusing supplemental oxygen for comfort. Minimal output from díaz catheter. Staff reports she did have small bites of food for breakfast but oral intake is minimal.        ALLERGIES: Lisinopril and Morphine  Past Medical, Surgical, Family and Social History reviewed and updated in [employer].    Current Outpatient Prescriptions   Medication Sig Dispense Refill     acetaminophen (TYLENOL) 650 MG Suppository Place 650 mg rectally every 4 hours as needed for fever       atropine 1 % ophthalmic solution 2 drops every 2 hours as needed       bisacodyl (DULCOLAX) 10 MG Suppository Place 10 mg rectally daily as needed for constipation       Dextromethorphan-guaiFENesin  MG/5ML syrup Take 10 mLs by mouth every 4 hours as needed for cough       HALOPERIDOL PO Take 0.5 mg by mouth every 6 hours as needed for agitation       hydrocortisone (ANUSOL-HC) 2.5 % cream Place 1 applicator rectally 2 times daily as needed for hemorrhoids       HYDROMORPHONE HCL PO Take 1 mg by mouth every 4 hours       HYDROMORPHONE HCL PO Take 1 mg by mouth every 2 hours as needed for moderate to severe pain       LORAZEPAM PO Take 0.25 mg by mouth 2 times daily AND every 4 hours PRN       trolamine salicylate (ASPERCREME) 10 % cream Apply topically 3 times daily        Medications reviewed:  Medications reconciled to facility chart and changes were made to reflect current medications as identified as above med list. Below are the changes that were made:   Medications stopped since last EPIC medication reconciliation:   Medications Discontinued During This Encounter   Medication Reason     ACETAMINOPHEN PO Discontinued by another Health Care Provider     ATENOLOL PO Discontinued by another Health Care Provider     CITALOPRAM HYDROBROMIDE PO Discontinued by another Health Care Provider     FUROSEMIDE PO Discontinued by another Health Care Provider     LOSARTAN POTASSIUM PO Discontinued by another Health Care Provider     Potassium (POTASSIMIN PO) Discontinued by another Health Care Provider     senna-docusate (SENOKOT-S;PERICOLACE) 8.6-50 MG per tablet Discontinued by another Health Care Provider       Medications started since last [facility] medication reconciliation:  Orders Placed This Encounter   Medications     trolamine salicylate (ASPERCREME) 10 % cream     Sig: Apply topically 3 times daily     HYDROMORPHONE HCL PO     Sig: Take 1 mg by mouth every 4 hours     LORAZEPAM PO     Sig: Take 0.25 mg by mouth 2 times daily AND every 4 hours PRN       REVIEW OF SYSTEMS:  Limited due to ongoing decline but family is reporting she is comfortable and not in pain.    Physical Exam:  Pulse 105  Resp 28  SpO2 (!) 65%  GENERAL APPEARANCE: In no distress lying in hospital bed  EYES: Tracking at times or with eyes closed  RESP: slight use of accessory muscles for respiratory effort but does not appeared labored  : díaz cath in place   M/S: bed bound-repositioned from staff  SKIN: pale with some mottling noted   PSYCH: appears aware of family and surroundings, quiet and calm        Assessment/Plan:  (R53.81) Declining functional status  (primary encounter diagnosis)  Comment: Declining to end of life  Plan:   -Dose adjustment to Ativan. Now 0.25 mg  Po/sL  BID and every 4H prn   -Ok to  give prn hydromorphone with the scheduled. Rinse mouth after administration.  -Continue other hospice orders  -Hospice following    Has been difficult for both immediate and extended family. Hospice has been very instrumental in helping support both Beth and her loved ones through this difficult journey. Appreciate all of their help, efforts, time and support. [facility] staff additionally providing exceptional cares.        Electronically signed by  JERED Deng CNP

## 2018-07-26 NOTE — LETTER
7/26/2018        RE: Yolanda Keen  [facility]  [address] [facility] [id number] [place] [address] GERIATRIC SERVICES    Chief Complaint   Patient presents with     correction Acute       [place] Medical Record Number:  [id number]    HPI:    Yolanda Keen is a 88 year old  (5/1/1930), who is being seen today for an episodic care visit at [facility].  HPI information obtained from: facility chart records.Today's concern is:  {FGS DX:200766}    ALLERGIES: Lisinopril and Morphine  Past Medical, Surgical, Family and Social History reviewed and updated in [facility].    Current Outpatient Prescriptions   Medication Sig Dispense Refill     acetaminophen (TYLENOL) 650 MG Suppository Place 650 mg rectally every 4 hours as needed for fever       atropine 1 % ophthalmic solution 2 drops every 2 hours as needed       bisacodyl (DULCOLAX) 10 MG Suppository Place 10 mg rectally daily as needed for constipation       Dextromethorphan-guaiFENesin  MG/5ML syrup Take 10 mLs by mouth every 4 hours as needed for cough       HALOPERIDOL PO Take 0.5 mg by mouth every 6 hours as needed for agitation       hydrocortisone (ANUSOL-HC) 2.5 % cream Place 1 applicator rectally 2 times daily as needed for hemorrhoids       HYDROMORPHONE HCL PO Take 1 mg by mouth every 4 hours       HYDROMORPHONE HCL PO Take 1 mg by mouth every 2 hours as needed for moderate to severe pain       LORAZEPAM PO Take 0.25 mg by mouth 2 times daily AND every 4 hours PRN       trolamine salicylate (ASPERCREME) 10 % cream Apply topically 3 times daily       Medications reviewed:  Medications reconciled to facility chart and changes were made to reflect current medications as identified as above med list. Below are the changes that were made:   Medications stopped since last EPIC medication reconciliation:   Medications Discontinued During This Encounter   Medication Reason     ACETAMINOPHEN PO Discontinued by another Health Care  Provider     ATENOLOL PO Discontinued by another Health Care Provider     CITALOPRAM HYDROBROMIDE PO Discontinued by another Health Care Provider     FUROSEMIDE PO Discontinued by another Health Care Provider     LOSARTAN POTASSIUM PO Discontinued by another Health Care Provider     Potassium (POTASSIMIN PO) Discontinued by another Health Care Provider     senna-docusate (SENOKOT-S;PERICOLACE) 8.6-50 MG per tablet Discontinued by another Health Care Provider       Medications started since last [facility] medication reconciliation:  Orders Placed This Encounter   Medications     trolamine salicylate (ASPERCREME) 10 % cream     Sig: Apply topically 3 times daily     HYDROMORPHONE HCL PO     Sig: Take 1 mg by mouth every 4 hours     LORAZEPAM PO     Sig: Take 0.25 mg by mouth 2 times daily AND every 4 hours PRN     ***    REVIEW OF SYSTEMS:  {ROS FGS:067339}    Physical Exam:  There were no vitals taken for this visit.  {[facility] physical exam :857624}    Recent Labs:     CBC RESULTS:   Recent Labs   Lab Test  06/01/18   1157 05/31/18   WBC  6.1  8.0   RBC  4.85  4.88   HGB  12.6  12.5   HCT  42.4  41.4   MCV  87  85   MCH  26.0*  25.6*   MCHC  29.7*  30.2*   RDW  16.2*  16.5*   PLT  171  172       Last Basic Metabolic Panel:  Recent Labs   Lab Test 06/11/18 06/04/18   0710   NA  140  138   POTASSIUM  4.3  3.9   CHLORIDE  102  102   ERIN  9.3  9.1   CO2  34*  33*   BUN  22  24   CR  0.84  0.74   GLC  85  87       Assessment/Plan:  {FGS DX:814711}    Orders:  ***    {FGS TIME SPENT:414827}    Electronically signed by  Jil Fine                    [place] [facility]    Chief Complaint   Patient presents with     Nursing Home Acute     Home Care/Hospice       [place] Medical Record Number:  [id number]    HPI:    Yolanda Keen is a 88 year old  (5/1/1930), who is being seen today for an episodic care visit at [facility].  HPI information obtained from: facility chart records.Today's concern  is:    Declining functional status  Resident with ongoing functional declining status and transitioning to actively dying. Today with visit she appears calm and pleasant. Many family members bedside and she does open her eyes and is tracking my presence at times. Oxygen sats are low and she has been refusing supplemental oxygen for comfort. Minimal output from díaz catheter. Staff reports she did have small bites of food for breakfast but oral intake is minimal.        ALLERGIES: Lisinopril and Morphine  Past Medical, Surgical, Family and Social History reviewed and updated in [facility].    Current Outpatient Prescriptions   Medication Sig Dispense Refill     acetaminophen (TYLENOL) 650 MG Suppository Place 650 mg rectally every 4 hours as needed for fever       atropine 1 % ophthalmic solution 2 drops every 2 hours as needed       bisacodyl (DULCOLAX) 10 MG Suppository Place 10 mg rectally daily as needed for constipation       Dextromethorphan-guaiFENesin  MG/5ML syrup Take 10 mLs by mouth every 4 hours as needed for cough       HALOPERIDOL PO Take 0.5 mg by mouth every 6 hours as needed for agitation       hydrocortisone (ANUSOL-HC) 2.5 % cream Place 1 applicator rectally 2 times daily as needed for hemorrhoids       HYDROMORPHONE HCL PO Take 1 mg by mouth every 4 hours       HYDROMORPHONE HCL PO Take 1 mg by mouth every 2 hours as needed for moderate to severe pain       LORAZEPAM PO Take 0.25 mg by mouth 2 times daily AND every 4 hours PRN       trolamine salicylate (ASPERCREME) 10 % cream Apply topically 3 times daily       Medications reviewed:  Medications reconciled to facility chart and changes were made to reflect current medications as identified as above med list. Below are the changes that were made:   Medications stopped since last EPIC medication reconciliation:   Medications Discontinued During This Encounter   Medication Reason     ACETAMINOPHEN PO Discontinued by another Health Care Provider      ATENOLOL PO Discontinued by another Health Care Provider     CITALOPRAM HYDROBROMIDE PO Discontinued by another Health Care Provider     FUROSEMIDE PO Discontinued by another Health Care Provider     LOSARTAN POTASSIUM PO Discontinued by another Health Care Provider     Potassium (POTASSIMIN PO) Discontinued by another Health Care Provider     senna-docusate (SENOKOT-S;PERICOLACE) 8.6-50 MG per tablet Discontinued by another Health Care Provider       Medications started since last EPIC medication reconciliation:  Orders Placed This Encounter   Medications     trolamine salicylate (ASPERCREME) 10 % cream     Sig: Apply topically 3 times daily     HYDROMORPHONE HCL PO     Sig: Take 1 mg by mouth every 4 hours     LORAZEPAM PO     Sig: Take 0.25 mg by mouth 2 times daily AND every 4 hours PRN       REVIEW OF SYSTEMS:  Limited due to ongoing decline but family is reporting she is comfortable and not in pain.    Physical Exam:  Pulse 105  Resp 28  SpO2 (!) 65%  GENERAL APPEARANCE: In no distress lying in hospital bed  EYES: Tracking at times or with eyes closed  RESP: slight use of accessory muscles for respiratory effort but does not appeared labored  : díaz cath in place   M/S: bed bound-repositioned from staff  SKIN: pale with some mottling noted   PSYCH: appears aware of family and surroundings, quiet and calm        Assessment/Plan:  (R53.81) Declining functional status  (primary encounter diagnosis)  Comment: Declining to end of life  Plan:   -Dose adjustment to Ativan. Now 0.25 mg  Po/sL  BID and every 4H prn   -Ok to give prn hydromorphone with the scheduled. Rinse mouth after administration.  -Continue other hospice orders  -Hospice foll    Has been a difficult process for both immediate and extended family. Hospice has been very instrumental in helping support both Beth and her loved ones through this difficult journey. Appreciate all of their help, efforts, time and support.        Electronically signed  by  JERED Deng CNP                      Sincerely,        JERED Deng CNP

## 2018-07-26 NOTE — LETTER
7/26/2018        RE: Yolanda Keen  [facility]  [address] [facility] [id number] [place] [address] GERIATRIC SERVICES    Chief Complaint   Patient presents with     snf Acute       [place] Medical Record Number:  [id number]    HPI:    Yolanda Keen is a 88 year old  (5/1/1930), who is being seen today for an episodic care visit at [facility].  HPI information obtained from: facility chart records.Today's concern is:  {FGS DX:219223}    ALLERGIES: Lisinopril and Morphine  Past Medical, Surgical, Family and Social History reviewed and updated in [facility].    Current Outpatient Prescriptions   Medication Sig Dispense Refill     acetaminophen (TYLENOL) 650 MG Suppository Place 650 mg rectally every 4 hours as needed for fever       atropine 1 % ophthalmic solution 2 drops every 2 hours as needed       bisacodyl (DULCOLAX) 10 MG Suppository Place 10 mg rectally daily as needed for constipation       Dextromethorphan-guaiFENesin  MG/5ML syrup Take 10 mLs by mouth every 4 hours as needed for cough       HALOPERIDOL PO Take 0.5 mg by mouth every 6 hours as needed for agitation       hydrocortisone (ANUSOL-HC) 2.5 % cream Place 1 applicator rectally 2 times daily as needed for hemorrhoids       HYDROMORPHONE HCL PO Take 1 mg by mouth every 4 hours       HYDROMORPHONE HCL PO Take 1 mg by mouth every 2 hours as needed for moderate to severe pain       LORAZEPAM PO Take 0.25 mg by mouth 2 times daily AND every 4 hours PRN       trolamine salicylate (ASPERCREME) 10 % cream Apply topically 3 times daily       Medications reviewed:  Medications reconciled to facility chart and changes were made to reflect current medications as identified as above med list. Below are the changes that were made:   Medications stopped since last EPIC medication reconciliation:   Medications Discontinued During This Encounter   Medication Reason     ACETAMINOPHEN PO Discontinued by another Health Care  Provider     ATENOLOL PO Discontinued by another Health Care Provider     CITALOPRAM HYDROBROMIDE PO Discontinued by another Health Care Provider     FUROSEMIDE PO Discontinued by another Health Care Provider     LOSARTAN POTASSIUM PO Discontinued by another Health Care Provider     Potassium (POTASSIMIN PO) Discontinued by another Health Care Provider     senna-docusate (SENOKOT-S;PERICOLACE) 8.6-50 MG per tablet Discontinued by another Health Care Provider       Medications started since last [facility] medication reconciliation:  Orders Placed This Encounter   Medications     trolamine salicylate (ASPERCREME) 10 % cream     Sig: Apply topically 3 times daily     HYDROMORPHONE HCL PO     Sig: Take 1 mg by mouth every 4 hours     LORAZEPAM PO     Sig: Take 0.25 mg by mouth 2 times daily AND every 4 hours PRN     ***    REVIEW OF SYSTEMS:  {ROS FGS:108496}    Physical Exam:  There were no vitals taken for this visit.  {[facility] physical exam :560770}    Recent Labs:     CBC RESULTS:   Recent Labs   Lab Test  06/01/18   1157 05/31/18   WBC  6.1  8.0   RBC  4.85  4.88   HGB  12.6  12.5   HCT  42.4  41.4   MCV  87  85   MCH  26.0*  25.6*   MCHC  29.7*  30.2*   RDW  16.2*  16.5*   PLT  171  172       Last Basic Metabolic Panel:  Recent Labs   Lab Test 06/11/18 06/04/18   0710   NA  140  138   POTASSIUM  4.3  3.9   CHLORIDE  102  102   ERIN  9.3  9.1   CO2  34*  33*   BUN  22  24   CR  0.84  0.74   GLC  85  87       Assessment/Plan:  {FGS DX:410419}    Orders:  ***    {FGS TIME SPENT:629358}    Electronically signed by  Jil Fine                    [place] [facility]    Chief Complaint   Patient presents with     Nursing Home Acute     Home Care/Hospice       [place] Medical Record Number:  [id number]    HPI:    Yolanda Keen is a 88 year old  (5/1/1930), who is being seen today for an episodic care visit at [facility].  HPI information obtained from: facility chart records.Today's concern  is:    Declining functional status  Resident with ongoing functional declining status and transitioning to actively dying. Today with visit she appears calm and pleasant. Many family members bedside and she does open her eyes and is tracking my presence at times. Oxygen sats are low and she has been refusing supplemental oxygen for comfort. Minimal output from díaz catheter. Staff reports she did have small bites of food for breakfast but oral intake is minimal.        ALLERGIES: Lisinopril and Morphine  Past Medical, Surgical, Family and Social History reviewed and updated in [facility].    Current Outpatient Prescriptions   Medication Sig Dispense Refill     acetaminophen (TYLENOL) 650 MG Suppository Place 650 mg rectally every 4 hours as needed for fever       atropine 1 % ophthalmic solution 2 drops every 2 hours as needed       bisacodyl (DULCOLAX) 10 MG Suppository Place 10 mg rectally daily as needed for constipation       Dextromethorphan-guaiFENesin  MG/5ML syrup Take 10 mLs by mouth every 4 hours as needed for cough       HALOPERIDOL PO Take 0.5 mg by mouth every 6 hours as needed for agitation       hydrocortisone (ANUSOL-HC) 2.5 % cream Place 1 applicator rectally 2 times daily as needed for hemorrhoids       HYDROMORPHONE HCL PO Take 1 mg by mouth every 4 hours       HYDROMORPHONE HCL PO Take 1 mg by mouth every 2 hours as needed for moderate to severe pain       LORAZEPAM PO Take 0.25 mg by mouth 2 times daily AND every 4 hours PRN       trolamine salicylate (ASPERCREME) 10 % cream Apply topically 3 times daily       Medications reviewed:  Medications reconciled to facility chart and changes were made to reflect current medications as identified as above med list. Below are the changes that were made:   Medications stopped since last EPIC medication reconciliation:   Medications Discontinued During This Encounter   Medication Reason     ACETAMINOPHEN PO Discontinued by another Health Care Provider      ATENOLOL PO Discontinued by another Health Care Provider     CITALOPRAM HYDROBROMIDE PO Discontinued by another Health Care Provider     FUROSEMIDE PO Discontinued by another Health Care Provider     LOSARTAN POTASSIUM PO Discontinued by another Health Care Provider     Potassium (POTASSIMIN PO) Discontinued by another Health Care Provider     senna-docusate (SENOKOT-S;PERICOLACE) 8.6-50 MG per tablet Discontinued by another Health Care Provider       Medications started since last EPIC medication reconciliation:  Orders Placed This Encounter   Medications     trolamine salicylate (ASPERCREME) 10 % cream     Sig: Apply topically 3 times daily     HYDROMORPHONE HCL PO     Sig: Take 1 mg by mouth every 4 hours     LORAZEPAM PO     Sig: Take 0.25 mg by mouth 2 times daily AND every 4 hours PRN       REVIEW OF SYSTEMS:  Limited due to ongoing decline but family is reporting she is comfortable and not in pain.    Physical Exam:  Pulse 105  Resp 28  SpO2 (!) 65%  GENERAL APPEARANCE: In no distress lying in hospital bed  EYES: Tracking at times or with eyes closed  RESP: slight use of accessory muscles for respiratory effort but does not appeared labored  : díaz cath in place   M/S: bed bound-repositioned from staff  SKIN: pale with some mottling noted   PSYCH: appears aware of family and surroundings, quiet and calm        Assessment/Plan:  (R53.81) Declining functional status  (primary encounter diagnosis)  Comment: Declining to end of life  Plan:   -Dose adjustment to Ativan. Now 0.25 mg  Po/sL  BID and every 4H prn   -Ok to give prn hydromorphone with the scheduled. Rinse mouth after administration.  -Continue other hospice orders  -Hospice foll    Has been a difficult process for both immediate and extended family. Hospice has been very instrumental in helping support both Beth and her loved ones through this difficult journey. Appreciate all of their help, efforts, time and support.        Electronically signed  by  JERED Deng CNP                      Sincerely,        JERED Deng CNP

## 2018-07-26 NOTE — LETTER
7/26/2018        RE: Yolanda Keen  [facility]  [address] [facility] [address]        [place] GERIATRIC SERVICES    Chief Complaint   Patient presents with     Nursing Home Acute     Home Care/Hospice       [place] Medical Record Number:  [id number]    HPI:    Yolanda Keen is a 88 year old  (5/1/1930), who is being seen today for an episodic care visit at [facility].  HPI information obtained from: facility chart records, family, hospice.Today's concern is:    Declining functional status  Resident with ongoing functional declining status and transitioning to actively dying. Today with visit she appears calm and pleasant. Many family members bedside and she does open her eyes and is tracking my presence at times. Oxygen sats are low and she has been refusing supplemental oxygen for comfort. Minimal output from díaz catheter. Staff reports she did have small bites of food for breakfast but oral intake is minimal.        ALLERGIES: Lisinopril and Morphine  Past Medical, Surgical, Family and Social History reviewed and updated in [facility].    Current Outpatient Prescriptions   Medication Sig Dispense Refill     acetaminophen (TYLENOL) 650 MG Suppository Place 650 mg rectally every 4 hours as needed for fever       atropine 1 % ophthalmic solution 2 drops every 2 hours as needed       bisacodyl (DULCOLAX) 10 MG Suppository Place 10 mg rectally daily as needed for constipation       Dextromethorphan-guaiFENesin  MG/5ML syrup Take 10 mLs by mouth every 4 hours as needed for cough       HALOPERIDOL PO Take 0.5 mg by mouth every 6 hours as needed for agitation       hydrocortisone (ANUSOL-HC) 2.5 % cream Place 1 applicator rectally 2 times daily as needed for hemorrhoids       HYDROMORPHONE HCL PO Take 1 mg by mouth every 4 hours       HYDROMORPHONE HCL PO Take 1 mg by mouth every 2 hours as needed for moderate to severe pain       LORAZEPAM PO Take 0.25 mg by mouth 2  times daily AND every 4 hours PRN       trolamine salicylate (ASPERCREME) 10 % cream Apply topically 3 times daily       Medications reviewed:  Medications reconciled to facility chart and changes were made to reflect current medications as identified as above med list. Below are the changes that were made:   Medications stopped since last EPIC medication reconciliation:   Medications Discontinued During This Encounter   Medication Reason     ACETAMINOPHEN PO Discontinued by another Health Care Provider     ATENOLOL PO Discontinued by another Health Care Provider     CITALOPRAM HYDROBROMIDE PO Discontinued by another Health Care Provider     FUROSEMIDE PO Discontinued by another Health Care Provider     LOSARTAN POTASSIUM PO Discontinued by another Health Care Provider     Potassium (POTASSIMIN PO) Discontinued by another Health Care Provider     senna-docusate (SENOKOT-S;PERICOLACE) 8.6-50 MG per tablet Discontinued by another Health Care Provider       Medications started since last [facility] medication reconciliation:  Orders Placed This Encounter   Medications     trolamine salicylate (ASPERCREME) 10 % cream     Sig: Apply topically 3 times daily     HYDROMORPHONE HCL PO     Sig: Take 1 mg by mouth every 4 hours     LORAZEPAM PO     Sig: Take 0.25 mg by mouth 2 times daily AND every 4 hours PRN       REVIEW OF SYSTEMS:  Limited due to ongoing decline but family is reporting she is comfortable and not in pain.    Physical Exam:  Pulse 105  Resp 28  SpO2 (!) 65%  GENERAL APPEARANCE: In no distress lying in hospital bed  EYES: Tracking at times or with eyes closed  RESP: slight use of accessory muscles for respiratory effort but does not appeared labored  : díaz cath in place   M/S: bed bound-repositioned from staff  SKIN: pale with some mottling noted   PSYCH: appears aware of family and surroundings, quiet and calm        Assessment/Plan:  (R53.81) Declining functional status  (primary encounter  diagnosis)  Comment: Declining to end of life  Plan:   -Dose adjustment to Ativan. Now 0.25 mg  Po/sL  BID and every 4H prn   -Ok to give prn hydromorphone with the scheduled. Rinse mouth after administration.  -Continue other hospice orders  -Hospice following    Has been difficult for both immediate and extended family. Hospice has been very instrumental in helping support both Beth and her loved ones through this difficult journey. Appreciate all of their help, efforts, time and support. [facility] staff additionally providing exceptional cares.        Electronically signed by  JERED Deng CNP                      Sincerely,        JERED Deng CNP

## 2021-05-31 VITALS — WEIGHT: 205.2 LBS | BODY MASS INDEX: 33.37 KG/M2

## 2021-05-31 VITALS — BODY MASS INDEX: 31.34 KG/M2 | HEIGHT: 66 IN | WEIGHT: 195 LBS

## 2021-05-31 VITALS — WEIGHT: 199.6 LBS | BODY MASS INDEX: 32.46 KG/M2

## 2021-06-01 VITALS — BODY MASS INDEX: 32.1 KG/M2 | WEIGHT: 197.4 LBS

## 2021-06-11 NOTE — PROGRESS NOTES
Assessment/Plan:        Diagnoses and all orders for this visit:    Essential hypertension controlled.  She will continue her current medications.-  -     Comprehensive Metabolic Panel    Atrial fibrillation she is anticoagulated and will continue to have her warfarin managed by that and to coagulation nurses.-  -     INR    Vitamin D deficiency-continue daily supplement.    Mild Single Episode Major Depression-the citalopram is helping her and she will continue with that.        Aortic Stenosis    Insomnia chronic which is improved as her depression improved.    Arthritis-she takes naproxen 1 tablet very occasionally when she has pain.    Disturbance of Gait-she uses a cane and has not had any falls.    Presence of cardiac pacemaker      I did discuss healthcare directive with her.  She does have on but we do not have it on file here.  I asked her to bring it in or mail it into a so we can get it in her chart.  She will follow-up with me in 6 months and sooner if any problems.  Subjective:    Patient ID: Shantal Marti is a 87 y.o. female.    HPI: Patient is here with her daughter to follow-up on her chronic conditions.  She is doing well.  She is taking her medications as prescribed.  She is still living in her home and takes her medications daily.  Her daughters to visit her regularly.  She has a nephew that lives with her.    The following portions of the patient's history were reviewed and updated as appropriate: allergies, current medications, past family history, past medical history, past social history, past surgical history and problem list.    Review of Systems      12 sys rev neg other than HPI    Objective:    Physical Exam       Patient is in no apparent physical distress.obese.   Vitals are as recorded.  Head and face are normal.  Conjunctiva are clear.  Neck is without adenopathy or masses.  Cardiovascular :  Regular rate and rhythm with 5/6 murmur, loudest on right side of chest.   Lungs are  clear with good air movement bilaterally.  Extremities are without edema.  Gait is steady with her cane.   Skin is without rashes.  Mood and affect are appropriate.

## 2021-06-12 NOTE — PROGRESS NOTES
Subjective:   Shantal Marti is a 87 y.o. female  Accompanied by Daughter Diane     Chief Complaint   Patient presents with     Shoulder Injury     fell, R/t, x 1 day   Patient says yesterday she was walking in the living room and fell on her right side. Says she only hit her right shoulder, and bruised her 2nd and 3rd right toes. Denies that she hit her head. She lives with her other daughter and grandson. Her grandson got to her after a few minutes. Denies any dizziness or chest pain before the fall. Says has been losing her balance and falling more frequently. Also fell sometime in the last week. She usually needs to use both hands to go upstairs and has not been able to use her right hand for balance. Denies any current headache or vision problems. Has taken some tylenol for pain yesterday. There are 10 children, and some have one level homes where she can stay.   Review of Systems  MS - Neuro - see HPI  Allergies   Allergen Reactions     Morphine Unknown     Other reaction(s): Nightmares       Current Outpatient Prescriptions:      atenolol (TENORMIN) 50 MG tablet, TAKE 1 TABLET BY MOUTH DAILY, Disp: 90 tablet, Rfl: 1     cholecalciferol, vitamin D3, 5,000 unit capsule, Take 5,000 Units by mouth daily., Disp: , Rfl:      citalopram (CELEXA) 40 MG tablet, Take 0.5 tablets (20 mg total) by mouth daily., Disp: 45 tablet, Rfl: 3     furosemide (LASIX) 20 MG tablet, TAKE 1 TABLET BY MOUTH DAILY., Disp: 90 tablet, Rfl: 2     losartan (COZAAR) 100 MG tablet, TAKE 1 TABLET BY MOUTH DAILY., Disp: 90 tablet, Rfl: 2     mv-min-folic acid-lutein (CENTRUM SILVER) 400-250 mcg Chew, Chew 1 tablet daily., Disp: 100 tablet, Rfl: 3     warfarin (COUMADIN) 2.5 MG tablet, Take 1 to 2 tablets (2.5 to 5 mg) by mouth daily. Adjust dose based on INR results as directed., Disp: 110 tablet, Rfl: 1     warfarin (COUMADIN) 5 MG tablet, Take 1/2 to 1 tablet (2.5 to 5 mg) by mouth daily. Adjust dose per INR results as instructed.,  Disp: 30 tablet, Rfl: 2     albuterol (PROVENTIL HFA;VENTOLIN HFA) 90 mcg/actuation inhaler, Inhale 2 puffs., Disp: , Rfl:      albuterol (PROVENTIL) 2.5 mg /3 mL (0.083 %) nebulizer solution, Inhale 2.5 mg., Disp: , Rfl:      calcium carbonate-vitamin D3 (CALCIUM 500 + D) 500 mg(1,250mg) -400 unit Tab, Take 1 tablet by mouth 2 (two) times a day. High Potency, Disp: , Rfl:      colchicine 0.6 mg tablet, Take 1.2 mg by mouth., Disp: , Rfl:      naproxen (NAPROSYN) 250 MG tablet, Take 250 mg by mouth., Disp: , Rfl:   Patient Active Problem List   Diagnosis     Vitamin D Deficiency     Mild Single Episode Major Depression     Hypertension     Aortic Stenosis     Excessive Facial Or Body Hair (Hirsutism)     Joint Pain, Localized In The Hip     Insomnia     Actinic keratosis     Arthritis     Disturbance of Gait     Atrial fibrillation     Essential hypertension     Presence of cardiac pacemaker     Advance directive discussed with patient     Encounter for therapeutic drug level monitoring     Calcific aortic valve stenosis     Medical History Reviewed  Objective:     Vitals:    07/24/17 1427   BP: 142/82   Pulse: 77   Resp: 16   Temp: 99.8  F (37.7  C)   TempSrc: Oral   SpO2: 92%   Gen - Pt in NAD  Musculoskeletal-  Right shoulder - small about 4 cm in diameter ecchymoses, with slight swelling or deformity - unable to abduct or flex shoulder - mildly TTP  Left shoulder  - no swelling or deformity-full range of motion - non TTP  Right elbow-no erythema, swelling or deformity; full range of motion  Right forearm-no erythema, swelling or deformity  Hands - able to make 100% fist  Xr Shoulder Right 2 Or More Vws    Result Date: 7/24/2017  XR SHOULDER RIGHT 2 OR MORE VWS 7/24/2017 3:53 PM INDICATION: Pain in right shoulder COMPARISON: None. FINDINGS: Mild acromioclavicular and glenohumeral arthropathy. Subacromial space appears preserved. No fracture or dislocation. 1.6 cm calcification adjacent the proximal humerus could  represent a calcified lymph node or less likely an intra-articular loose body. This report was electronically interpreted by: Dr. Saul Franklin MD ON 07/24/2017 at 15:57  Radiologist's report discussed day of visit.      Assessment - Plan   Medical Decision Making -87-year-old woman status post fall yesterday onto her right shoulder with continued pain and decreased range of motion.  Discussed patient's x-ray that was negative for a fracture or dislocation but the patient needs to be followed up by her primary if pain is not improving.  Patient was given a sling more for comfort.  Patient says she did not want to take anything stronger than Tylenol and discussed that she should not be taking either Naprosyn or ibuprofen because she is on warfarin.    1. Contusion of right shoulder or upper extremity, initial encounter  - Slings    2. Acute shoulder pain due to trauma, righ  - XR Shoulder Right 2 or More VWS; Future    3. Fall from slip, trip, or stumble, initial encounter    At the conclusion of the encounter, assessment and plan were discussed.   All questions were answered.   The patient or guardian acknowledged understanding and was involved in the decision making regarding the overall care plan.    Patient Instructions   1. If pain not improving over next week, follow up with your primary provider or one of her partners  2. Avoid any activity which aggravates the pain  3. May take 2 tabs of tylenol every 6 hours as needed for pain ( max of 6 tabs in 24 hours)  4. May use either warm or cold packs as needed for comfort  5. Ok to use the Salonpas as needed for comfort  6. Call clinic number with any questions

## 2021-06-15 PROBLEM — I35.0: Status: ACTIVE | Noted: 2017-02-20

## 2021-06-15 NOTE — PROGRESS NOTES
"[facility]    Facility:   [facility] [[id number]]   Code Status: DNR/DNI      CHIEF COMPLAINT/REASON FOR VISIT:  Chief Complaint   Patient presents with     Problem Visit     Hemorrhoids, Medication Management, Cough s/p Influenza       HISTORY:      HPI: Shantal is a 87 y.o. female presenting for an intake evaluation to the TCU. She was admitted to the hospital and had a stay for influenza A, however dates of admission are unclear due to records being unavailable (nursing staff are working on finding a hospital summary and disposition). She has a significant medical history including CHF, A fib with anticoagulation therapy, HTN, pacemaker in situ, renal cancer, LVH and aortic stenosis. She is quite forgetful and daughter-in-law states that this is her baseline. Belem, as she likes to be called, is alert and oriented x 2, she knows herself and that she is in some sort of hospital. She cannot remember much regarding her recent hospitalization and daughter-in-law helps to fill in the gaps. She was severely deconditioned after having a bout of influenza A and was admitted to the hospital during that episode, dates of hospitalization are somewhat unclear. Daughter-in-law reports that family is quite aware of the memory problems and feel it is time to transition her to a memory care facility being that she is unsafe in her home. Belem states she doesn't know where her home is or where she lives.     Today the patient is evaluated in her room. She is alert, pleasant, and conversant. Today a note has been left by her nursing staff that relates that her daughter wants a call and wants specific items done. This list includes a visualization and assessment of Belem's hemorrhoids, assessment of cough, and assessment of \"lasix level\" being that patient only has one kidney. She states she has no idea why her daughter wants us to look at her hemorrhoids. She thinks this is \"stupid\". She has " been feeling better since admission to the hospital and does not have any medical issues she would like to discuss today. She denies any other concerns including headaches, vision changes, chest pain/pressure, difficulty breathing, SOB, abdominal pain, nausea, vomiting, diarrhea, dysuria, increasing weakness.    Spoke with daughter Isa and she shares cough is worse in the evenings. Other than that she just wanted us to know all of the items listed above.     No past medical history on file.          Family History   Problem Relation Age of Onset     Hypertension Mother      Stroke Mother      Hypertension Father      Cancer Father      Social History     Social History     Marital status:      Spouse name: N/A     Number of children: N/A     Years of education: N/A     Social History Main Topics     Smoking status: Never Smoker     Smokeless tobacco: Never Used     Alcohol use No     Drug use: No     Sexual activity: Not on file     Other Topics Concern     Not on file     Social History Narrative       REVIEW OF SYSTEM:  Pertinent items are noted in HPI.  A 12 point comprehensive review of systems was negative except as noted.   Somewhat limited and possibly unreliable due to patient's memory impairments.     PHYSICAL EXAM:   /81  Pulse (!) 58  Temp (!) 96.2  F (35.7  C)  Resp 16  Wt 197 lb 6.4 oz (89.5 kg)  SpO2 92%  BMI 32.1 kg/m2  General appearance: alert, appears stated age and cooperative  Head: Normocephalic, without obvious abnormality, atraumatic  Eyes: conjunctivae/corneas clear. PERRL, EOM's intact. Fundi benign.  Lungs: clear to auscultation bilaterally  Heart: regular rate and rhythm, S1, S2 normal, no murmur, click, rub or gallop  Abdomen: soft, non-tender; bowel sounds normal; no masses,  no organomegaly  Rectal exam: perirectal area with slight, soft skin tag like; clean/dry/intact  Extremities: extremities normal, atraumatic, no cyanosis or edema  Pulses: 2+ and  symmetric  Skin: Skin color, texture, turgor normal. No rashes or lesions  Neurologic: Grossly normal      LABS:   None today.    ASSESSMENT:      ICD-10-CM    1. Physical deconditioning R53.81    2. History of influenza Z87.09    3. Edema R60.9    4. S/p nephrectomy Z90.5        PLAN:    Cough  -Continue to monitor, slight cough is expected s/p influenza.   -Increase fluids, wash hands frequently, offer comfort measures such as hot tea, steam baths.     Edema  -Continue Lasix as is, kidney function per labs is WNL.  -Wrap legs if needed.   -Encouraged elevation of legs.     Hemorrhoids  -Continue previous plan, nothing worrisome at this time.    Otherwise continue current care plan for all other chronic medical conditions, as they are stable. Encouraged patient to engage in healthy lifestyle behaviors such as engaging in social activities, exercising (PT/OT), eating well, and following their care plan. Follow up in the next week for routine check-up, or sooner if needed. Will continue to monitor patient and work with nursing staff collaboratively to work toward positive patient outcomes.    Greater than 35 minutes spent with patient with at least 55% of this time spent on counseling, education, and discussion of the above care plan with nursing staff, and patient.     Electronically signed by: Maria De Jesus Ayala CNP

## 2021-06-15 NOTE — PROGRESS NOTES
[facility]      Facility:    [facility] [[id number]]  Code Status: DNR      Chief Complaint/Reason for Visit:  Chief Complaint   Patient presents with     H & P       HPI:   Shantal is a 87 y.o. female who presented to the hospital with influenza a, which compromised her already frail medical health problems such that she was hypoxic.  Her chest x-ray showed congestion consistent with CHF and mild cardiomegaly, but the concern at the time was one of dehydration and the overall clinical picture, so she did not receive her usual Lasix 20 mg initially in the hospital and then this was resumed.  She also has chronic atrial fibrillation for which she receives typically Coumadin 2.5 mg all days except for Tuesday when she receives 5 mg.  Her INR was subtherapeutic at 1.4.  There is concern about mild cognitive impairment as well as physical decline to wonder whether she can remain independent at this time or whether a higher level of care is required.  She does have aortic stenosis and an ejection fraction of 55-60% was noted on the echocardiogram of 2/16/17.    Of greatest concern upon transfer to transitional care unit has been on the progressive worsening of edema and I discussed with her daughter over the phone about this and it is the worst that she has ever seen.  At this point she is recovering from the symptoms associated with influenza a in terms of cough and shortness of breath.  She is not having chest pain.  She has no fevers or chills.  No headache nor his congestion or sore throat.  No abdominal pain or nausea now.  No diarrhea now.  No dysuria.    Past Medical History: Her past medical history is significant for aortic stenosis, hypertension, chronic atrial fibrillation, and status post cardiac pacemaker.    Surgical History:  Past Surgical History:   Procedure Laterality Date     CATARACT EXTRACTION       CHOLECYSTECTOMY       HYSTERECTOMY       JOINT REPLACEMENT        NEPHRECTOMY Left about 1995    cancerous       Family History:   Family History   Problem Relation Age of Onset     Hypertension Mother      Stroke Mother      Hypertension Father      Cancer Father        Social History:    Social History     Social History     Marital status:      Spouse name: N/A     Number of children: N/A     Years of education: N/A     Social History Main Topics     Smoking status: Never Smoker     Smokeless tobacco: Never Used     Alcohol use No     Drug use: No     Sexual activity: Not on file     Other Topics Concern     Not on file     Social History Narrative          Review of Systems   All other systems reviewed and are negative.      Vitals:    01/26/18 1403   BP: (!) 170/91   Pulse: 80   Resp: 16   Temp: (!) 95.3  F (35.2  C)   SpO2: 92%   Weight: 199 lb 9.6 oz (90.5 kg)       Physical Exam   Constitutional: No distress.   HENT:   Mouth/Throat: Oropharynx is clear and moist.   Eyes: Right eye exhibits no discharge. Left eye exhibits no discharge.   Neck: No JVD present. No thyromegaly present.   Cardiovascular: Normal rate.    Irregular   Pulmonary/Chest: Effort normal and breath sounds normal.   Abdominal: Soft. Bowel sounds are normal. She exhibits no distension. There is no tenderness.   Musculoskeletal:   2+ pitting edema bilaterally.  Homans sign is negative.  Calves are nontender.   Lymphadenopathy:     She has no cervical adenopathy.   Neurological: She is alert.   Skin:   There is no skin breakdown of lower extremities of calves or ankles or feet.  Nor is there warmth or redness.   Psychiatric: She has a normal mood and affect.   Nursing note and vitals reviewed.      Medication List:  Current Outpatient Prescriptions   Medication Sig     albuterol (PROVENTIL HFA;VENTOLIN HFA) 90 mcg/actuation inhaler Inhale 2 puffs.     albuterol (PROVENTIL) 2.5 mg /3 mL (0.083 %) nebulizer solution Inhale 2.5 mg.     atenolol (TENORMIN) 50 MG tablet TAKE 1 TABLET BY MOUTH DAILY      calcium carbonate-vitamin D3 (CALCIUM 500 + D) 500 mg(1,250mg) -400 unit Tab Take 1 tablet by mouth 2 (two) times a day. High Potency     cholecalciferol, vitamin D3, 5,000 unit capsule Take 5,000 Units by mouth daily.     citalopram (CELEXA) 40 MG tablet Take 0.5 tablets (20 mg total) by mouth daily.     colchicine 0.6 mg tablet Take 1.2 mg by mouth.     furosemide (LASIX) 20 MG tablet TAKE ONE TABLET BY MOUTH ONE TIME DAILY      losartan (COZAAR) 100 MG tablet TAKE 1 TABLET BY MOUTH DAILY.     mv-min-folic acid-lutein (CENTRUM SILVER) 400-250 mcg Chew Chew 1 tablet daily.     naproxen (NAPROSYN) 250 MG tablet Take 250 mg by mouth.     warfarin (COUMADIN) 2.5 MG tablet Take 1 to 2 tablets (2.5 to 5 mg) by mouth daily. Adjust dose based on INR results as directed.     warfarin (COUMADIN) 5 MG tablet Take 1/2 to 1 tablet (2.5 to 5 mg) by mouth daily. Adjust dose per INR results as instructed.       Labs:  Pertinent from the laboratory at the hospital was a hemoglobin of 11.6.  Sodium 133, potassium 3.9, creatinine 0.77, and BUN of 16.  INR was 1.4.    Assessment:    ICD-10-CM    1. History of influenza Z87.09    2. Edema, unspecified type R60.9    3. Calcific aortic valve stenosis I35.0    4. Chronic atrial fibrillation I48.2        Plan:  I discussed plans with her daughter over the phone as well as discussed with the patient directly.  An additional dose of Lasix 20 mg will be given this afternoon.  Then she will receive 40 mg every morning over the weekend.  BMP and CBC will be done at the next routine lab day early this coming week.  Compression stockings will be put on in the morning and taken off at night that are knee-high.  She is to elevate her legs for 1-2 hours in the afternoon.  I talked with her daughter about the risks of leg swelling as well as the risks of treatment with increased Lasix.  She would like her to return to her usual dose of 20 mg Lasix as soon as possible.      Electronically signed by:  Teo Fuentes MD

## 2021-06-15 NOTE — PROGRESS NOTES
[facility]    Facility:   [facility] [[id number]]   Code Status: DNR/DNI      CHIEF COMPLAINT/REASON FOR VISIT:  Chief Complaint   Patient presents with     Review Of Multiple Medical Conditions     s/p influenza, physical deconditioning       HISTORY:      HPI: Shantal is a 87 y.o. female presenting for an intake evaluation to the TCU. She was admitted to the hospital and had a stay for influenza A, however dates of admission are unclear due to records being unavailable (nursing staff are working on finding a hospital summary and disposition). She has a significant medical history including CHF, A fib with anticoagulation therapy, HTN, pacemaker in situ, renal cancer, LVH and aortic stenosis. She is quite forgetful and daughter-in-law states that this is her baseline. Belem, as she likes to be called, is alert and oriented x 2, she knows herself and that she is in some sort of hospital. She cannot remember much regarding her recent hospitalization and daughter-in-law helps to fill in the gaps. She was severely deconditioned after having a bout of influenza A and was admitted to the hospital during that episode, dates of hospitalization are somewhat unclear. Daughter-in-law reports that family is quite aware of the memory problems and feel it is time to transition her to a memory care facility being that she is unsafe in her home. Belem states she doesn't know where her home is or where she lives. She has been feeling better since admission to the hospital and does not have any medical issues she would like to discuss today. She denies any other concerns including headaches, vision changes, chest pain/pressure, difficulty breathing, SOB, abdominal pain, nausea, vomiting, diarrhea, dysuria, increasing weakness. Cough has nearly cleared from pneumonia, she is continuing to use oxygen intermittently but mostly for comfort as oxygen saturation has been good without it. Nursing staff  do not have any concerns at this time, however they do note that she has hemorrhoids and would like to have an as needed anusol cream on file should they need it.     No past medical history on file.          Family History   Problem Relation Age of Onset     Hypertension Mother      Stroke Mother      Hypertension Father      Cancer Father      Social History     Social History     Marital status:      Spouse name: N/A     Number of children: N/A     Years of education: N/A     Social History Main Topics     Smoking status: Never Smoker     Smokeless tobacco: Never Used     Alcohol use No     Drug use: No     Sexual activity: Not on file     Other Topics Concern     Not on file     Social History Narrative       REVIEW OF SYSTEM:  Pertinent items are noted in HPI.  A 12 point comprehensive review of systems was negative except as noted.   Somewhat limited and possibly unreliable due to patient's memory impairments.     PHYSICAL EXAM:   /74  Pulse (!) 55  Temp 98.2  F (36.8  C)  Resp 18  Wt 205 lb 3.2 oz (93.1 kg)  SpO2 94%  BMI 33.37 kg/m2  General appearance: alert, appears stated age and cooperative  Head: Normocephalic, without obvious abnormality, atraumatic  Eyes: conjunctivae/corneas clear. PERRL, EOM's intact. Fundi benign.  Lungs: clear to auscultation bilaterally  Heart: regular rate and rhythm, S1, S2 normal, no murmur, click, rub or gallop  Abdomen: soft, non-tender; bowel sounds normal; no masses,  no organomegaly  Extremities: extremities normal, atraumatic, no cyanosis or edema  Pulses: 2+ and symmetric  Skin: Skin color, texture, turgor normal. No rashes or lesions  Neurologic: Grossly normal      LABS:   None today, will follow INR.     ASSESSMENT:      ICD-10-CM    1. History of influenza Z87.09    2. Physical deconditioning R53.81        PLAN:    Admission history and physical per MD in the next week. At this time continue current care plan for all chronic medical conditions, as  they are stable. Encouraged patient to engage in PT/OT for strengthening and conditioning. Encouraged patient to work closely with nursing staff to ensure any medical complaints are quickly addressed. Follow up this week or sooner if needed. Will continue to monitor patient and work with nursing staff collaboratively to work toward positive patient outcomes.    History of Hemorrhoids  -Anusol cream, apply topically twice daily as needed for hemorrhoid irritation, pain, or itching.     Anticoagulation Therapy  -INR at goal today, continue same dose and recheck in one week.     Greater than 35 minutes spent with patient with at least 55% of this time spent on counseling, education, and discussion of the above care plan with nursing staff, and patient.     Electronically signed by: Maria De Jesus Ayala CNP

## 2021-06-16 PROBLEM — R60.9 EDEMA: Status: ACTIVE | Noted: 2018-01-30

## 2021-06-16 PROBLEM — Z87.09 HISTORY OF INFLUENZA: Status: ACTIVE | Noted: 2018-01-24

## 2021-06-16 PROBLEM — R53.81 PHYSICAL DECONDITIONING: Status: ACTIVE | Noted: 2018-01-24

## 2021-06-16 PROBLEM — Z90.5 S/P NEPHRECTOMY: Status: ACTIVE | Noted: 2018-01-30

## 2021-06-16 PROBLEM — R41.3 MEMORY LOSS: Status: ACTIVE | Noted: 2018-02-16

## 2021-07-18 ENCOUNTER — HEALTH MAINTENANCE LETTER (OUTPATIENT)
Age: 86
End: 2021-07-18

## 2021-09-12 ENCOUNTER — HEALTH MAINTENANCE LETTER (OUTPATIENT)
Age: 86
End: 2021-09-12

## 2022-02-17 PROBLEM — Q60.0 RENAL AGENESIS, UNILATERAL: Status: ACTIVE | Noted: 2018-01-01

## 2022-06-09 NOTE — LETTER
2/22/2018        RE: Yolanda Keen  Care of [facility]  [address]  [facility] [address]        [facility]  PRIMARY CARE PROVIDER AND CLINIC:  Lupillo Brasher [address] [place] [address] / [facility] [address]  Chief Complaint   Patient presents with     New Patient       HPI:    Yolanda Keen is a 87 year old  (5/1/1930),admitted to the [facility]/30/2018 through 02/20/2018. And [facility] stay 01/23/2018 through 01/30/3018. And [facility] stay 01/18/2018 through 01/23/2018.  Admitted to this facility for  rehab, medical management and nursing care.  HPI information obtained from: facility chart records, facility staff, patient report, [facility] chart review and Care Everywhere [facility] chart review. Prior to hospitalization she was living alone in the community. She is , has several children and grandchildren in the community. Her daughter- in- law Radha helps with her cares. Radha has another family member living at [facility]. Current issues are:        Chronic congestive heart failure, unspecified congestive heart failure type (H)  Calcific aortic valve stenosis  Pacemaker  Last echo on 2/16/17 shows EF at 55-60% with moderate aortic stenosis. She requires the head of the bed to be elevated more than 30 degrees most of the time due to congestive heart failure and difficulty with breathing when the head of the bed is not elevated. Additionally she requires a bed height difference with a fully electric bed versus a fixed height hospital bed to permit safety with transfers from sit to stand position from the bed. Because of her CHF in conjunction with COPD she also requires frequent body position changes every 1-2 hours to prevent difficulties with breathing. She does require staff assist at times to accomplish this goal related to lack of strength and forgetfulness to the problem and solution.    Chronic  a-fib (H)  Benign essential hypertension  Lower extremity edema  Hx of with tachy-sanket syndrome. Anticoagulation with coumadin. BP averages in /-92. In the AL BP averages higher at 145/86. Weight on 2/20 stable at 193 lbs. Has some lower extremity edema baseline.     Adjustment disorder with depressed mood  Pleasant and calm at today's visit.    Cognitive impairment  SLUMS 9/30 and CPT 3.6/5.6. New to memory care from the community.    Vitamin D deficiency  On supplement.    Insomnia, unspecified type  No concerns from staff.    CODE STATUS/ADVANCE DIRECTIVES DISCUSSION:   DNR only  Patient's living condition: lives in an assisted living facility-memory care    ALLERGIES:Lisinopril and Morphine  PAST MEDICAL HISTORY:  has a past medical history of Cardiac pacemaker in situ and Tachy-sanket syndrome (H).  PAST SURGICAL HISTORY:  has a past surgical history that includes TOTAL KNEE ARTHROPLASTY (Right, 2003); Hysterectomy (1980); Nephrectomy (Left, 1982); and Cholecystectomy (1980).  FAMILY HISTORY: family history is not on file.  SOCIAL HISTORY:  reports that she has never smoked. She does not have any smokeless tobacco history on file.    Post Discharge Medication Reconciliation Status: discharge medications reconciled, continue medications without change.  Current Outpatient Prescriptions   Medication Sig Dispense Refill     ACETAMINOPHEN PO Take 500 mg by mouth 3 times daily as needed for pain       albuterol (PROAIR HFA/PROVENTIL HFA/VENTOLIN HFA) 108 (90 BASE) MCG/ACT Inhaler Inhale 2 puffs into the lungs 4 times daily as needed        Cholecalciferol 5000 UNITS TABS Take 1 capsule by mouth every morning       hydrocortisone (ANUSOL-HC) 2.5 % cream Place 1 applicator rectally 2 times daily as needed for hemorrhoids       ONDANSETRON PO Take 4 mg by mouth every 8 hours as needed for nausea or vomiting       CITALOPRAM HYDROBROMIDE PO Take 20 mg by mouth every morning       MELATONIN PO Take 3 mg by  mouth nightly as needed       Dextromethorphan-guaiFENesin  MG/5ML syrup Take 10 mLs by mouth every 4 hours as needed for cough       FUROSEMIDE PO Take 40 mg by mouth every morning       LOSARTAN POTASSIUM PO Take 100 mg by mouth every morning       ATENOLOL PO Take 50 mg by mouth 2 times daily       WARFARIN SODIUM PO Take 4 mg by mouth daily until 02/21; check INR 02/22       Potassium (POTASSIMIN PO) Take 20 mEq by mouth every morning         ROS:  Limited secondary to cognitive impairment but today pt reports fine    Exam:  /70  Pulse 66  Temp 97.4  F (36.3  C)  Resp 16  SpO2 91%  GENERAL APPEARANCE:  Alert, in no distress  ENT:  Mouth and posterior oropharynx normal, moist mucous membranes, [place], dentures upper  EYES:  EOM, conjunctivae, lids, pupils and irises normal, wears glasses  NECK:  No adenopathy,masses or thyromegaly  RESP:  respiratory effort and palpation of chest normal, lungs clear to auscultation   CV:  Palpation and auscultation of heart done , regular rate and rhythm, no murmur, rub, or gallop, trace edema in legs, ankles and feet. Nate Hose  ABDOMEN:  normal bowel sounds, soft, nontender, no hepatosplenomegaly or other masses  M/S:   Up with SBA  SKIN:  Inspection of skin and subcutaneous tissue baseline  NEURO:   Examination of sensation by touch normal  PSYCH:  insight and judgement impaired    Lab/Diagnostic data:    Last Basic Metabolic Panel:  Recent Labs   Lab Test  02/13/18   0630   NA  140   POTASSIUM  4.1   CHLORIDE  105   ERIN  9.1   CO2  32   BUN  22   CR  0.81   GLC  80     1/21/18     TSH:  0.85      1/18/18  WBC  4.7     RBC  4.41     Hgb 11.6      HCT 37.4     MCV 85      ASSESSMENT/PLAN:  (I50.9) Chronic congestive heart failure, unspecified congestive heart failure type (H)  (primary encounter diagnosis)  (I35.0) Calcific aortic valve stenosis  (Z95.0) Cardiac pacemaker in situ  Comment: Chronic and Stable  Plan:   -Last pacer check was 12/7/17  -VS weekly,  weights are monthly in AL    (I48.2) Chronic a-fib (H)  (I10) Benign essential hypertension  (R60.0) Lower extremity edema  Comment: Chronic and stable  Plan:     -Losartan 100 mg po daily  -Lasix 40 mg po daily   -Potassium 20 mEq po daily  -Atenolol 50 mg po daily  -Nate Hose on am and off HS    (F43.21) Adjustment disorder with depressed mood  (R41.89) Cognitive impairment  Comment: Ongoing and stable  Plan:   -Citalopram 20 mg po qd  -Monitor mood and affect with adjustment to memory care unit    (E55.9) Vitamin D deficiency  Comment: Hx of  Plan:   -Cholecalciferol 5000 units po daily  -Lab level with next set of labs    (G47.00) Insomnia, unspecified type  Comment: Chronic and stable  Plan:  -melatonin 3 mg po at HS          Electronically signed by:  JERED Deng CNP                    Sincerely,        JERED Deng CNP     nsr

## 2022-08-14 ENCOUNTER — HEALTH MAINTENANCE LETTER (OUTPATIENT)
Age: 87
End: 2022-08-14

## 2022-11-19 ENCOUNTER — HEALTH MAINTENANCE LETTER (OUTPATIENT)
Age: 87
End: 2022-11-19

## 2023-09-09 ENCOUNTER — HEALTH MAINTENANCE LETTER (OUTPATIENT)
Age: 88
End: 2023-09-09